# Patient Record
Sex: MALE | Race: WHITE | NOT HISPANIC OR LATINO | Employment: FULL TIME | URBAN - NONMETROPOLITAN AREA
[De-identification: names, ages, dates, MRNs, and addresses within clinical notes are randomized per-mention and may not be internally consistent; named-entity substitution may affect disease eponyms.]

---

## 2021-01-01 ENCOUNTER — HOSPITAL ENCOUNTER (INPATIENT)
Facility: HOSPITAL | Age: 41
LOS: 8 days | End: 2021-10-20
Attending: INTERNAL MEDICINE | Admitting: INTERNAL MEDICINE

## 2021-01-01 ENCOUNTER — APPOINTMENT (OUTPATIENT)
Dept: GENERAL RADIOLOGY | Facility: HOSPITAL | Age: 41
End: 2021-01-01

## 2021-01-01 ENCOUNTER — APPOINTMENT (OUTPATIENT)
Dept: CARDIOLOGY | Facility: HOSPITAL | Age: 41
End: 2021-01-01

## 2021-01-01 ENCOUNTER — APPOINTMENT (OUTPATIENT)
Dept: CT IMAGING | Facility: HOSPITAL | Age: 41
End: 2021-01-01

## 2021-01-01 ENCOUNTER — APPOINTMENT (OUTPATIENT)
Dept: ULTRASOUND IMAGING | Facility: HOSPITAL | Age: 41
End: 2021-01-01

## 2021-01-01 VITALS
WEIGHT: 315 LBS | HEIGHT: 72 IN | BODY MASS INDEX: 42.66 KG/M2 | RESPIRATION RATE: 32 BRPM | HEART RATE: 73 BPM | OXYGEN SATURATION: 82 % | SYSTOLIC BLOOD PRESSURE: 121 MMHG | TEMPERATURE: 99.1 F | DIASTOLIC BLOOD PRESSURE: 86 MMHG

## 2021-01-01 DIAGNOSIS — N17.9 AKI (ACUTE KIDNEY INJURY) (HCC): ICD-10-CM

## 2021-01-01 DIAGNOSIS — J96.01 ACUTE RESPIRATORY FAILURE WITH HYPOXIA (HCC): Primary | ICD-10-CM

## 2021-01-01 DIAGNOSIS — U07.1 COVID-19: ICD-10-CM

## 2021-01-01 DIAGNOSIS — U07.1 PNEUMONIA DUE TO COVID-19 VIRUS: ICD-10-CM

## 2021-01-01 DIAGNOSIS — J12.82 PNEUMONIA DUE TO COVID-19 VIRUS: ICD-10-CM

## 2021-01-01 LAB
ALBUMIN SERPL-MCNC: 2.9 G/DL (ref 3.5–5.2)
ALBUMIN SERPL-MCNC: 3.1 G/DL (ref 3.5–5.2)
ALBUMIN SERPL-MCNC: 3.4 G/DL (ref 3.5–5.2)
ALBUMIN SERPL-MCNC: 3.4 G/DL (ref 3.5–5.2)
ALBUMIN SERPL-MCNC: 3.5 G/DL (ref 3.5–5.2)
ALBUMIN SERPL-MCNC: 3.5 G/DL (ref 3.5–5.2)
ALBUMIN/GLOB SERPL: 0.8 G/DL
ALBUMIN/GLOB SERPL: 0.9 G/DL
ALBUMIN/GLOB SERPL: 1 G/DL
ALBUMIN/GLOB SERPL: 1 G/DL
ALP SERPL-CCNC: 100 U/L (ref 39–117)
ALP SERPL-CCNC: 120 U/L (ref 39–117)
ALP SERPL-CCNC: 218 U/L (ref 39–117)
ALP SERPL-CCNC: 56 U/L (ref 39–117)
ALP SERPL-CCNC: 58 U/L (ref 39–117)
ALP SERPL-CCNC: 60 U/L (ref 39–117)
ALP SERPL-CCNC: 73 U/L (ref 39–117)
ALP SERPL-CCNC: 83 U/L (ref 39–117)
ALP SERPL-CCNC: 88 U/L (ref 39–117)
ALT SERPL W P-5'-P-CCNC: 19 U/L (ref 1–41)
ALT SERPL W P-5'-P-CCNC: 20 U/L (ref 1–41)
ALT SERPL W P-5'-P-CCNC: 21 U/L (ref 1–41)
ALT SERPL W P-5'-P-CCNC: 21 U/L (ref 1–41)
ALT SERPL W P-5'-P-CCNC: 22 U/L (ref 1–41)
ALT SERPL W P-5'-P-CCNC: 28 U/L (ref 1–41)
ALT SERPL W P-5'-P-CCNC: 33 U/L (ref 1–41)
ALT SERPL W P-5'-P-CCNC: 37 U/L (ref 1–41)
ALT SERPL W P-5'-P-CCNC: 68 U/L (ref 1–41)
ANION GAP SERPL CALCULATED.3IONS-SCNC: 10 MMOL/L (ref 5–15)
ANION GAP SERPL CALCULATED.3IONS-SCNC: 10 MMOL/L (ref 5–15)
ANION GAP SERPL CALCULATED.3IONS-SCNC: 11 MMOL/L (ref 5–15)
ANION GAP SERPL CALCULATED.3IONS-SCNC: 12 MMOL/L (ref 5–15)
ANION GAP SERPL CALCULATED.3IONS-SCNC: 17 MMOL/L (ref 5–15)
ANISOCYTOSIS BLD QL: ABNORMAL
ARTERIAL PATENCY WRIST A: ABNORMAL
ARTERIAL PATENCY WRIST A: POSITIVE
AST SERPL-CCNC: 143 U/L (ref 1–40)
AST SERPL-CCNC: 25 U/L (ref 1–40)
AST SERPL-CCNC: 30 U/L (ref 1–40)
AST SERPL-CCNC: 32 U/L (ref 1–40)
AST SERPL-CCNC: 35 U/L (ref 1–40)
AST SERPL-CCNC: 37 U/L (ref 1–40)
AST SERPL-CCNC: 37 U/L (ref 1–40)
AST SERPL-CCNC: 44 U/L (ref 1–40)
AST SERPL-CCNC: 44 U/L (ref 1–40)
ATMOSPHERIC PRESS: 750 MMHG
ATMOSPHERIC PRESS: 754 MMHG
ATMOSPHERIC PRESS: 756 MMHG
BACTERIA SPEC AEROBE CULT: NORMAL
BACTERIA SPEC AEROBE CULT: NORMAL
BACTERIA SPEC RESP CULT: NORMAL
BASE EXCESS BLDA CALC-SCNC: -13.8 MMOL/L (ref 0–2)
BASE EXCESS BLDA CALC-SCNC: -3.5 MMOL/L (ref 0–2)
BASE EXCESS BLDA CALC-SCNC: -5.2 MMOL/L (ref 0–2)
BASE EXCESS BLDA CALC-SCNC: -6.3 MMOL/L (ref 0–2)
BASE EXCESS BLDA CALC-SCNC: 1.1 MMOL/L (ref 0–2)
BASOPHILS # BLD AUTO: 0.01 10*3/MM3 (ref 0–0.2)
BASOPHILS # BLD AUTO: 0.01 10*3/MM3 (ref 0–0.2)
BASOPHILS # BLD AUTO: 0.02 10*3/MM3 (ref 0–0.2)
BASOPHILS # BLD AUTO: 0.04 10*3/MM3 (ref 0–0.2)
BASOPHILS # BLD AUTO: 0.04 10*3/MM3 (ref 0–0.2)
BASOPHILS NFR BLD AUTO: 0.1 % (ref 0–1.5)
BASOPHILS NFR BLD AUTO: 0.2 % (ref 0–1.5)
BASOPHILS NFR BLD AUTO: 0.3 % (ref 0–1.5)
BASOPHILS NFR BLD AUTO: 0.4 % (ref 0–1.5)
BASOPHILS NFR BLD AUTO: 0.4 % (ref 0–1.5)
BDY SITE: ABNORMAL
BH CV ECHO MEAS - AO MAX PG (FULL): 6 MMHG
BH CV ECHO MEAS - AO MAX PG: 9.2 MMHG
BH CV ECHO MEAS - AO MEAN PG (FULL): 5 MMHG
BH CV ECHO MEAS - AO MEAN PG: 7 MMHG
BH CV ECHO MEAS - AO ROOT AREA (BSA CORRECTED): 1.5
BH CV ECHO MEAS - AO ROOT AREA: 11.9 CM^2
BH CV ECHO MEAS - AO ROOT DIAM: 3.9 CM
BH CV ECHO MEAS - AO V2 MAX: 152 CM/SEC
BH CV ECHO MEAS - AO V2 MEAN: 123 CM/SEC
BH CV ECHO MEAS - AO V2 VTI: 34.2 CM
BH CV ECHO MEAS - AVA(I,A): 2.5 CM^2
BH CV ECHO MEAS - AVA(I,D): 2.5 CM^2
BH CV ECHO MEAS - AVA(V,A): 2.4 CM^2
BH CV ECHO MEAS - AVA(V,D): 2.4 CM^2
BH CV ECHO MEAS - BSA(HAYCOCK): 2.8 M^2
BH CV ECHO MEAS - BSA: 2.6 M^2
BH CV ECHO MEAS - BZI_BMI: 43.9 KILOGRAMS/M^2
BH CV ECHO MEAS - BZI_METRIC_HEIGHT: 182.9 CM
BH CV ECHO MEAS - BZI_METRIC_WEIGHT: 147 KG
BH CV ECHO MEAS - EDV(CUBED): 124.3 ML
BH CV ECHO MEAS - EDV(MOD-SP4): 60 ML
BH CV ECHO MEAS - EDV(TEICH): 117.7 ML
BH CV ECHO MEAS - EF(MOD-SP4): 65.5 %
BH CV ECHO MEAS - ESV(MOD-SP4): 20.7 ML
BH CV ECHO MEAS - IVS/LVPW: 1.1
BH CV ECHO MEAS - IVSD: 1.5 CM
BH CV ECHO MEAS - LA DIMENSION: 3.9 CM
BH CV ECHO MEAS - LA/AO: 1
BH CV ECHO MEAS - LV DIASTOLIC VOL/BSA (35-75): 22.9 ML/M^2
BH CV ECHO MEAS - LV MASS(C)D: 293.6 GRAMS
BH CV ECHO MEAS - LV MASS(C)DI: 112.3 GRAMS/M^2
BH CV ECHO MEAS - LV MAX PG: 3.2 MMHG
BH CV ECHO MEAS - LV MEAN PG: 2 MMHG
BH CV ECHO MEAS - LV SYSTOLIC VOL/BSA (12-30): 7.9 ML/M^2
BH CV ECHO MEAS - LV V1 MAX: 89.4 CM/SEC
BH CV ECHO MEAS - LV V1 MEAN: 65.5 CM/SEC
BH CV ECHO MEAS - LV V1 VTI: 20.2 CM
BH CV ECHO MEAS - LVIDD: 5 CM
BH CV ECHO MEAS - LVLD AP4: 6.3 CM
BH CV ECHO MEAS - LVLS AP4: 5.8 CM
BH CV ECHO MEAS - LVOT AREA (M): 4.2 CM^2
BH CV ECHO MEAS - LVOT AREA: 4.2 CM^2
BH CV ECHO MEAS - LVOT DIAM: 2.3 CM
BH CV ECHO MEAS - LVPWD: 1.3 CM
BH CV ECHO MEAS - MV A MAX VEL: 92.2 CM/SEC
BH CV ECHO MEAS - MV DEC SLOPE: 369 CM/SEC^2
BH CV ECHO MEAS - MV DEC TIME: 0.28 SEC
BH CV ECHO MEAS - MV E MAX VEL: 104 CM/SEC
BH CV ECHO MEAS - MV E/A: 1.1
BH CV ECHO MEAS - SI(AO): 156.2 ML/M^2
BH CV ECHO MEAS - SI(LVOT): 32.1 ML/M^2
BH CV ECHO MEAS - SI(MOD-SP4): 15 ML/M^2
BH CV ECHO MEAS - SV(AO): 408.6 ML
BH CV ECHO MEAS - SV(LVOT): 83.9 ML
BH CV ECHO MEAS - SV(MOD-SP4): 39.3 ML
BILIRUB SERPL-MCNC: 0.5 MG/DL (ref 0–1.2)
BILIRUB SERPL-MCNC: 0.6 MG/DL (ref 0–1.2)
BILIRUB SERPL-MCNC: 0.6 MG/DL (ref 0–1.2)
BILIRUB SERPL-MCNC: 0.7 MG/DL (ref 0–1.2)
BILIRUB SERPL-MCNC: 1.1 MG/DL (ref 0–1.2)
BILIRUB SERPL-MCNC: 1.2 MG/DL (ref 0–1.2)
BODY TEMPERATURE: 37 C
BUN SERPL-MCNC: 21 MG/DL (ref 6–20)
BUN SERPL-MCNC: 22 MG/DL (ref 6–20)
BUN SERPL-MCNC: 24 MG/DL (ref 6–20)
BUN SERPL-MCNC: 26 MG/DL (ref 6–20)
BUN SERPL-MCNC: 28 MG/DL (ref 6–20)
BUN SERPL-MCNC: 32 MG/DL (ref 6–20)
BUN SERPL-MCNC: 34 MG/DL (ref 6–20)
BUN SERPL-MCNC: 37 MG/DL (ref 6–20)
BUN SERPL-MCNC: 43 MG/DL (ref 6–20)
BUN/CREAT SERPL: 16 (ref 7–25)
BUN/CREAT SERPL: 16.6 (ref 7–25)
BUN/CREAT SERPL: 18.6 (ref 7–25)
BUN/CREAT SERPL: 21.8 (ref 7–25)
BUN/CREAT SERPL: 22.6 (ref 7–25)
BUN/CREAT SERPL: 27.2 (ref 7–25)
BUN/CREAT SERPL: 29.1 (ref 7–25)
BUN/CREAT SERPL: 29.8 (ref 7–25)
BUN/CREAT SERPL: 7.6 (ref 7–25)
CALCIUM SPEC-SCNC: 7.2 MG/DL (ref 8.6–10.5)
CALCIUM SPEC-SCNC: 7.7 MG/DL (ref 8.6–10.5)
CALCIUM SPEC-SCNC: 8.5 MG/DL (ref 8.6–10.5)
CALCIUM SPEC-SCNC: 8.5 MG/DL (ref 8.6–10.5)
CALCIUM SPEC-SCNC: 8.6 MG/DL (ref 8.6–10.5)
CALCIUM SPEC-SCNC: 8.9 MG/DL (ref 8.6–10.5)
CALCIUM SPEC-SCNC: 8.9 MG/DL (ref 8.6–10.5)
CHLORIDE SERPL-SCNC: 100 MMOL/L (ref 98–107)
CHLORIDE SERPL-SCNC: 103 MMOL/L (ref 98–107)
CHLORIDE SERPL-SCNC: 103 MMOL/L (ref 98–107)
CHLORIDE SERPL-SCNC: 94 MMOL/L (ref 98–107)
CHLORIDE SERPL-SCNC: 94 MMOL/L (ref 98–107)
CHLORIDE SERPL-SCNC: 96 MMOL/L (ref 98–107)
CHLORIDE SERPL-SCNC: 97 MMOL/L (ref 98–107)
CHOLEST SERPL-MCNC: 124 MG/DL (ref 0–200)
CO2 SERPL-SCNC: 19 MMOL/L (ref 22–29)
CO2 SERPL-SCNC: 22 MMOL/L (ref 22–29)
CO2 SERPL-SCNC: 23 MMOL/L (ref 22–29)
CO2 SERPL-SCNC: 25 MMOL/L (ref 22–29)
CO2 SERPL-SCNC: 25 MMOL/L (ref 22–29)
CREAT SERPL-MCNC: 1.03 MG/DL (ref 0.76–1.27)
CREAT SERPL-MCNC: 1.1 MG/DL (ref 0.76–1.27)
CREAT SERPL-MCNC: 1.1 MG/DL (ref 0.76–1.27)
CREAT SERPL-MCNC: 1.14 MG/DL (ref 0.76–1.27)
CREAT SERPL-MCNC: 1.15 MG/DL (ref 0.76–1.27)
CREAT SERPL-MCNC: 1.18 MG/DL (ref 0.76–1.27)
CREAT SERPL-MCNC: 1.31 MG/DL (ref 0.76–1.27)
CREAT SERPL-MCNC: 2.59 MG/DL (ref 0.76–1.27)
CREAT SERPL-MCNC: 4.88 MG/DL (ref 0.76–1.27)
CRP SERPL-MCNC: 0.39 MG/DL (ref 0–0.5)
CRP SERPL-MCNC: 0.52 MG/DL (ref 0–0.5)
CRP SERPL-MCNC: 0.93 MG/DL (ref 0–0.5)
CRP SERPL-MCNC: 2.13 MG/DL (ref 0–0.5)
CRP SERPL-MCNC: 2.16 MG/DL (ref 0–0.5)
CRP SERPL-MCNC: 2.97 MG/DL (ref 0–0.5)
CRP SERPL-MCNC: 5.4 MG/DL (ref 0–0.5)
CRP SERPL-MCNC: 9.37 MG/DL (ref 0–0.5)
D DIMER PPP FEU-MCNC: 1.35 MG/L (FEU) (ref 0–0.5)
D-LACTATE SERPL-SCNC: 1.7 MMOL/L (ref 0.5–2)
DACRYOCYTES BLD QL SMEAR: ABNORMAL
DEPRECATED RDW RBC AUTO: 40.9 FL (ref 37–54)
DEPRECATED RDW RBC AUTO: 41.1 FL (ref 37–54)
DEPRECATED RDW RBC AUTO: 41.3 FL (ref 37–54)
DEPRECATED RDW RBC AUTO: 41.6 FL (ref 37–54)
DEPRECATED RDW RBC AUTO: 42.3 FL (ref 37–54)
DEPRECATED RDW RBC AUTO: 45.6 FL (ref 37–54)
DEPRECATED RDW RBC AUTO: 45.6 FL (ref 37–54)
EOSINOPHIL # BLD AUTO: 0 10*3/MM3 (ref 0–0.4)
EOSINOPHIL # BLD AUTO: 0.01 10*3/MM3 (ref 0–0.4)
EOSINOPHIL # BLD MANUAL: 0.11 10*3/MM3 (ref 0–0.4)
EOSINOPHIL # BLD MANUAL: 0.13 10*3/MM3 (ref 0–0.4)
EOSINOPHIL # BLD MANUAL: 0.76 10*3/MM3 (ref 0–0.4)
EOSINOPHIL NFR BLD AUTO: 0 % (ref 0.3–6.2)
EOSINOPHIL NFR BLD AUTO: 0.1 % (ref 0.3–6.2)
EOSINOPHIL NFR BLD MANUAL: 1 % (ref 0.3–6.2)
EOSINOPHIL NFR BLD MANUAL: 1 % (ref 0.3–6.2)
EOSINOPHIL NFR BLD MANUAL: 2 % (ref 0.3–6.2)
ERYTHROCYTE [DISTWIDTH] IN BLOOD BY AUTOMATED COUNT: 13.7 % (ref 12.3–15.4)
ERYTHROCYTE [DISTWIDTH] IN BLOOD BY AUTOMATED COUNT: 13.8 % (ref 12.3–15.4)
ERYTHROCYTE [DISTWIDTH] IN BLOOD BY AUTOMATED COUNT: 13.8 % (ref 12.3–15.4)
ERYTHROCYTE [DISTWIDTH] IN BLOOD BY AUTOMATED COUNT: 13.9 % (ref 12.3–15.4)
ERYTHROCYTE [DISTWIDTH] IN BLOOD BY AUTOMATED COUNT: 14.1 % (ref 12.3–15.4)
ERYTHROCYTE [DISTWIDTH] IN BLOOD BY AUTOMATED COUNT: 14.6 % (ref 12.3–15.4)
ERYTHROCYTE [DISTWIDTH] IN BLOOD BY AUTOMATED COUNT: 14.6 % (ref 12.3–15.4)
FERRITIN SERPL-MCNC: 1243 NG/ML (ref 30–400)
FERRITIN SERPL-MCNC: 1328 NG/ML (ref 30–400)
FERRITIN SERPL-MCNC: 1502 NG/ML (ref 30–400)
FERRITIN SERPL-MCNC: 1582 NG/ML (ref 30–400)
FERRITIN SERPL-MCNC: 1608 NG/ML (ref 30–400)
FERRITIN SERPL-MCNC: 1641 NG/ML (ref 30–400)
FERRITIN SERPL-MCNC: 2717 NG/ML (ref 30–400)
GAS FLOW AIRWAY: 6 LPM
GFR SERPL CREATININE-BSD FRML MDRD: 13 ML/MIN/1.73
GFR SERPL CREATININE-BSD FRML MDRD: 28 ML/MIN/1.73
GFR SERPL CREATININE-BSD FRML MDRD: 61 ML/MIN/1.73
GFR SERPL CREATININE-BSD FRML MDRD: 68 ML/MIN/1.73
GFR SERPL CREATININE-BSD FRML MDRD: 70 ML/MIN/1.73
GFR SERPL CREATININE-BSD FRML MDRD: 71 ML/MIN/1.73
GFR SERPL CREATININE-BSD FRML MDRD: 74 ML/MIN/1.73
GFR SERPL CREATININE-BSD FRML MDRD: 74 ML/MIN/1.73
GFR SERPL CREATININE-BSD FRML MDRD: 80 ML/MIN/1.73
GFR SERPL CREATININE-BSD FRML MDRD: ABNORMAL ML/MIN/{1.73_M2}
GLOBULIN UR ELPH-MCNC: 3.2 GM/DL
GLOBULIN UR ELPH-MCNC: 3.2 GM/DL
GLOBULIN UR ELPH-MCNC: 3.3 GM/DL
GLOBULIN UR ELPH-MCNC: 3.3 GM/DL
GLOBULIN UR ELPH-MCNC: 3.5 GM/DL
GLOBULIN UR ELPH-MCNC: 3.8 GM/DL
GLOBULIN UR ELPH-MCNC: 4 GM/DL
GLOBULIN UR ELPH-MCNC: 4.1 GM/DL
GLOBULIN UR ELPH-MCNC: 4.1 GM/DL
GLUCOSE BLDC GLUCOMTR-MCNC: 220 MG/DL (ref 70–130)
GLUCOSE BLDC GLUCOMTR-MCNC: 237 MG/DL (ref 70–130)
GLUCOSE BLDC GLUCOMTR-MCNC: 241 MG/DL (ref 70–130)
GLUCOSE BLDC GLUCOMTR-MCNC: 243 MG/DL (ref 70–130)
GLUCOSE BLDC GLUCOMTR-MCNC: 251 MG/DL (ref 70–130)
GLUCOSE BLDC GLUCOMTR-MCNC: 256 MG/DL (ref 70–130)
GLUCOSE BLDC GLUCOMTR-MCNC: 270 MG/DL (ref 70–130)
GLUCOSE BLDC GLUCOMTR-MCNC: 274 MG/DL (ref 70–130)
GLUCOSE BLDC GLUCOMTR-MCNC: 277 MG/DL (ref 70–130)
GLUCOSE BLDC GLUCOMTR-MCNC: 284 MG/DL (ref 70–130)
GLUCOSE BLDC GLUCOMTR-MCNC: 288 MG/DL (ref 70–130)
GLUCOSE BLDC GLUCOMTR-MCNC: 288 MG/DL (ref 70–130)
GLUCOSE BLDC GLUCOMTR-MCNC: 290 MG/DL (ref 70–130)
GLUCOSE BLDC GLUCOMTR-MCNC: 292 MG/DL (ref 70–130)
GLUCOSE BLDC GLUCOMTR-MCNC: 293 MG/DL (ref 70–130)
GLUCOSE BLDC GLUCOMTR-MCNC: 297 MG/DL (ref 70–130)
GLUCOSE BLDC GLUCOMTR-MCNC: 304 MG/DL (ref 70–130)
GLUCOSE BLDC GLUCOMTR-MCNC: 310 MG/DL (ref 70–130)
GLUCOSE BLDC GLUCOMTR-MCNC: 314 MG/DL (ref 70–130)
GLUCOSE BLDC GLUCOMTR-MCNC: 314 MG/DL (ref 70–130)
GLUCOSE BLDC GLUCOMTR-MCNC: 317 MG/DL (ref 70–130)
GLUCOSE BLDC GLUCOMTR-MCNC: 319 MG/DL (ref 70–130)
GLUCOSE BLDC GLUCOMTR-MCNC: 319 MG/DL (ref 70–130)
GLUCOSE BLDC GLUCOMTR-MCNC: 321 MG/DL (ref 70–130)
GLUCOSE BLDC GLUCOMTR-MCNC: 330 MG/DL (ref 70–130)
GLUCOSE BLDC GLUCOMTR-MCNC: 334 MG/DL (ref 70–130)
GLUCOSE BLDC GLUCOMTR-MCNC: 335 MG/DL (ref 70–130)
GLUCOSE BLDC GLUCOMTR-MCNC: 335 MG/DL (ref 70–130)
GLUCOSE BLDC GLUCOMTR-MCNC: 348 MG/DL (ref 70–130)
GLUCOSE BLDC GLUCOMTR-MCNC: 350 MG/DL (ref 70–130)
GLUCOSE BLDC GLUCOMTR-MCNC: 356 MG/DL (ref 70–130)
GLUCOSE BLDC GLUCOMTR-MCNC: 366 MG/DL (ref 70–130)
GLUCOSE BLDC GLUCOMTR-MCNC: 371 MG/DL (ref 70–130)
GLUCOSE BLDC GLUCOMTR-MCNC: 372 MG/DL (ref 70–130)
GLUCOSE BLDC GLUCOMTR-MCNC: 374 MG/DL (ref 70–130)
GLUCOSE BLDC GLUCOMTR-MCNC: 393 MG/DL (ref 70–130)
GLUCOSE BLDC GLUCOMTR-MCNC: 408 MG/DL (ref 70–130)
GLUCOSE BLDC GLUCOMTR-MCNC: 499 MG/DL (ref 70–130)
GLUCOSE SERPL-MCNC: 214 MG/DL (ref 65–99)
GLUCOSE SERPL-MCNC: 219 MG/DL (ref 65–99)
GLUCOSE SERPL-MCNC: 226 MG/DL (ref 65–99)
GLUCOSE SERPL-MCNC: 279 MG/DL (ref 65–99)
GLUCOSE SERPL-MCNC: 282 MG/DL (ref 65–99)
GLUCOSE SERPL-MCNC: 300 MG/DL (ref 65–99)
GLUCOSE SERPL-MCNC: 319 MG/DL (ref 65–99)
GLUCOSE SERPL-MCNC: 322 MG/DL (ref 65–99)
GLUCOSE SERPL-MCNC: 349 MG/DL (ref 65–99)
GRAM STN SPEC: NORMAL
HAV IGM SERPL QL IA: NORMAL
HBA1C MFR BLD: 8.4 % (ref 4.8–5.6)
HBV CORE IGM SERPL QL IA: NORMAL
HBV SURFACE AB SER RIA-ACNC: ABNORMAL
HBV SURFACE AG SERPL QL IA: NORMAL
HCO3 BLDA-SCNC: 21.6 MMOL/L (ref 20–26)
HCO3 BLDA-SCNC: 23.3 MMOL/L (ref 20–26)
HCO3 BLDA-SCNC: 25.5 MMOL/L (ref 20–26)
HCO3 BLDA-SCNC: 26.4 MMOL/L (ref 20–26)
HCO3 BLDA-SCNC: 27.4 MMOL/L (ref 20–26)
HCT VFR BLD AUTO: 43.2 % (ref 37.5–51)
HCT VFR BLD AUTO: 44.2 % (ref 37.5–51)
HCT VFR BLD AUTO: 44.5 % (ref 37.5–51)
HCT VFR BLD AUTO: 44.9 % (ref 37.5–51)
HCT VFR BLD AUTO: 45.6 % (ref 37.5–51)
HCT VFR BLD AUTO: 45.9 % (ref 37.5–51)
HCT VFR BLD AUTO: 48.4 % (ref 37.5–51)
HCT VFR BLD AUTO: 48.8 % (ref 37.5–51)
HCT VFR BLD AUTO: 49 % (ref 37.5–51)
HCV AB SER DONR QL: NORMAL
HDLC SERPL-MCNC: 15 MG/DL (ref 40–60)
HGB BLD-MCNC: 14.6 G/DL (ref 13–17.7)
HGB BLD-MCNC: 15 G/DL (ref 13–17.7)
HGB BLD-MCNC: 15 G/DL (ref 13–17.7)
HGB BLD-MCNC: 15.2 G/DL (ref 13–17.7)
HGB BLD-MCNC: 15.2 G/DL (ref 13–17.7)
HGB BLD-MCNC: 15.4 G/DL (ref 13–17.7)
HGB BLD-MCNC: 15.9 G/DL (ref 13–17.7)
HGB BLD-MCNC: 16.1 G/DL (ref 13–17.7)
HGB BLD-MCNC: 16.2 G/DL (ref 13–17.7)
IMM GRANULOCYTES # BLD AUTO: 0.06 10*3/MM3 (ref 0–0.05)
IMM GRANULOCYTES # BLD AUTO: 0.08 10*3/MM3 (ref 0–0.05)
IMM GRANULOCYTES # BLD AUTO: 0.13 10*3/MM3 (ref 0–0.05)
IMM GRANULOCYTES # BLD AUTO: 0.21 10*3/MM3 (ref 0–0.05)
IMM GRANULOCYTES # BLD AUTO: 0.28 10*3/MM3 (ref 0–0.05)
IMM GRANULOCYTES NFR BLD AUTO: 0.9 % (ref 0–0.5)
IMM GRANULOCYTES NFR BLD AUTO: 1.3 % (ref 0–0.5)
IMM GRANULOCYTES NFR BLD AUTO: 1.5 % (ref 0–0.5)
IMM GRANULOCYTES NFR BLD AUTO: 2 % (ref 0–0.5)
IMM GRANULOCYTES NFR BLD AUTO: 2.9 % (ref 0–0.5)
INHALED O2 CONCENTRATION: 100 %
L PNEUMO1 AG UR QL IA: NEGATIVE
LDH SERPL-CCNC: 694 U/L (ref 135–225)
LDLC SERPL CALC-MCNC: 65 MG/DL (ref 0–100)
LDLC/HDLC SERPL: 3.67 {RATIO}
LYMPHOCYTES # BLD AUTO: 0.92 10*3/MM3 (ref 0.7–3.1)
LYMPHOCYTES # BLD AUTO: 0.94 10*3/MM3 (ref 0.7–3.1)
LYMPHOCYTES # BLD AUTO: 1.06 10*3/MM3 (ref 0.7–3.1)
LYMPHOCYTES # BLD AUTO: 1.09 10*3/MM3 (ref 0.7–3.1)
LYMPHOCYTES # BLD AUTO: 1.44 10*3/MM3 (ref 0.7–3.1)
LYMPHOCYTES # BLD MANUAL: 0.32 10*3/MM3 (ref 0.7–3.1)
LYMPHOCYTES # BLD MANUAL: 0.76 10*3/MM3 (ref 0.7–3.1)
LYMPHOCYTES # BLD MANUAL: 0.81 10*3/MM3 (ref 0.7–3.1)
LYMPHOCYTES # BLD MANUAL: 1.14 10*3/MM3 (ref 0.7–3.1)
LYMPHOCYTES NFR BLD AUTO: 11 % (ref 19.6–45.3)
LYMPHOCYTES NFR BLD AUTO: 11.1 % (ref 19.6–45.3)
LYMPHOCYTES NFR BLD AUTO: 18.2 % (ref 19.6–45.3)
LYMPHOCYTES NFR BLD AUTO: 22.3 % (ref 19.6–45.3)
LYMPHOCYTES NFR BLD AUTO: 8.7 % (ref 19.6–45.3)
LYMPHOCYTES NFR BLD MANUAL: 1 % (ref 19.6–45.3)
LYMPHOCYTES NFR BLD MANUAL: 1 % (ref 5–12)
LYMPHOCYTES NFR BLD MANUAL: 2 % (ref 19.6–45.3)
LYMPHOCYTES NFR BLD MANUAL: 2 % (ref 5–12)
LYMPHOCYTES NFR BLD MANUAL: 3 % (ref 19.6–45.3)
LYMPHOCYTES NFR BLD MANUAL: 6 % (ref 5–12)
LYMPHOCYTES NFR BLD MANUAL: 6.1 % (ref 19.6–45.3)
LYMPHOCYTES NFR BLD MANUAL: 7.1 % (ref 5–12)
Lab: ABNORMAL
MAGNESIUM SERPL-MCNC: 2.3 MG/DL (ref 1.6–2.6)
MCH RBC QN AUTO: 27.1 PG (ref 26.6–33)
MCH RBC QN AUTO: 27.2 PG (ref 26.6–33)
MCH RBC QN AUTO: 27.3 PG (ref 26.6–33)
MCH RBC QN AUTO: 27.8 PG (ref 26.6–33)
MCH RBC QN AUTO: 28.1 PG (ref 26.6–33)
MCH RBC QN AUTO: 28.3 PG (ref 26.6–33)
MCH RBC QN AUTO: 28.3 PG (ref 26.6–33)
MCH RBC QN AUTO: 28.4 PG (ref 26.6–33)
MCH RBC QN AUTO: 28.4 PG (ref 26.6–33)
MCHC RBC AUTO-ENTMCNC: 32.9 G/DL (ref 31.5–35.7)
MCHC RBC AUTO-ENTMCNC: 32.9 G/DL (ref 31.5–35.7)
MCHC RBC AUTO-ENTMCNC: 33 G/DL (ref 31.5–35.7)
MCHC RBC AUTO-ENTMCNC: 33.1 G/DL (ref 31.5–35.7)
MCHC RBC AUTO-ENTMCNC: 33.1 G/DL (ref 31.5–35.7)
MCHC RBC AUTO-ENTMCNC: 33.7 G/DL (ref 31.5–35.7)
MCHC RBC AUTO-ENTMCNC: 33.8 G/DL (ref 31.5–35.7)
MCHC RBC AUTO-ENTMCNC: 34.3 G/DL (ref 31.5–35.7)
MCHC RBC AUTO-ENTMCNC: 34.4 G/DL (ref 31.5–35.7)
MCV RBC AUTO: 82.3 FL (ref 79–97)
MCV RBC AUTO: 82.3 FL (ref 79–97)
MCV RBC AUTO: 82.4 FL (ref 79–97)
MCV RBC AUTO: 82.6 FL (ref 79–97)
MCV RBC AUTO: 82.7 FL (ref 79–97)
MCV RBC AUTO: 82.9 FL (ref 79–97)
MCV RBC AUTO: 83.2 FL (ref 79–97)
MCV RBC AUTO: 85.5 FL (ref 79–97)
MCV RBC AUTO: 86.2 FL (ref 79–97)
METAMYELOCYTES NFR BLD MANUAL: 1 % (ref 0–0)
METAMYELOCYTES NFR BLD MANUAL: 1 % (ref 0–0)
METAMYELOCYTES NFR BLD MANUAL: 2 % (ref 0–0)
MICROCYTES BLD QL: ABNORMAL
MODALITY: ABNORMAL
MONOCYTES # BLD AUTO: 0.32 10*3/MM3 (ref 0.1–0.9)
MONOCYTES # BLD AUTO: 0.59 10*3/MM3 (ref 0.1–0.9)
MONOCYTES # BLD AUTO: 0.65 10*3/MM3 (ref 0.1–0.9)
MONOCYTES # BLD AUTO: 0.67 10*3/MM3 (ref 0.1–0.9)
MONOCYTES # BLD AUTO: 0.7 10*3/MM3 (ref 0.1–0.9)
MONOCYTES # BLD AUTO: 0.76 10*3/MM3 (ref 0.1–0.9)
MONOCYTES # BLD AUTO: 0.77 10*3/MM3 (ref 0.1–0.9)
MONOCYTES # BLD AUTO: 0.86 10*3/MM3 (ref 0.1–0.9)
MONOCYTES # BLD AUTO: 0.95 10*3/MM3 (ref 0.1–0.9)
MONOCYTES NFR BLD AUTO: 11.7 % (ref 5–12)
MONOCYTES NFR BLD AUTO: 13.3 % (ref 5–12)
MONOCYTES NFR BLD AUTO: 6.1 % (ref 5–12)
MONOCYTES NFR BLD AUTO: 7.3 % (ref 5–12)
MONOCYTES NFR BLD AUTO: 7.9 % (ref 5–12)
MRSA DNA SPEC QL NAA+PROBE: NORMAL
NEUTROPHILS # BLD AUTO: 11.44 10*3/MM3 (ref 1.7–7)
NEUTROPHILS # BLD AUTO: 31.08 10*3/MM3 (ref 1.7–7)
NEUTROPHILS # BLD AUTO: 34.81 10*3/MM3 (ref 1.7–7)
NEUTROPHILS # BLD AUTO: 9.08 10*3/MM3 (ref 1.7–7)
NEUTROPHILS NFR BLD AUTO: 4.09 10*3/MM3 (ref 1.7–7)
NEUTROPHILS NFR BLD AUTO: 4.12 10*3/MM3 (ref 1.7–7)
NEUTROPHILS NFR BLD AUTO: 6.7 10*3/MM3 (ref 1.7–7)
NEUTROPHILS NFR BLD AUTO: 63.2 % (ref 42.7–76)
NEUTROPHILS NFR BLD AUTO: 68.6 % (ref 42.7–76)
NEUTROPHILS NFR BLD AUTO: 7.62 10*3/MM3 (ref 1.7–7)
NEUTROPHILS NFR BLD AUTO: 79.4 % (ref 42.7–76)
NEUTROPHILS NFR BLD AUTO: 79.5 % (ref 42.7–76)
NEUTROPHILS NFR BLD AUTO: 8.65 10*3/MM3 (ref 1.7–7)
NEUTROPHILS NFR BLD AUTO: 81.6 % (ref 42.7–76)
NEUTROPHILS NFR BLD MANUAL: 80 % (ref 42.7–76)
NEUTROPHILS NFR BLD MANUAL: 83.8 % (ref 42.7–76)
NEUTROPHILS NFR BLD MANUAL: 84 % (ref 42.7–76)
NEUTROPHILS NFR BLD MANUAL: 87.5 % (ref 42.7–76)
NEUTS BAND NFR BLD MANUAL: 12 % (ref 0–5)
NEUTS BAND NFR BLD MANUAL: 2 % (ref 0–5)
NEUTS BAND NFR BLD MANUAL: 9.4 % (ref 0–5)
NOTIFIED BY: ABNORMAL
NOTIFIED WHO: ABNORMAL
NRBC BLD AUTO-RTO: 0 /100 WBC (ref 0–0.2)
NRBC SPEC MANUAL: 1 /100 WBC (ref 0–0.2)
PCO2 BLDA: 30.1 MM HG (ref 35–45)
PCO2 BLDA: 66 MM HG (ref 35–45)
PCO2 BLDA: 79.3 MM HG (ref 35–45)
PCO2 BLDA: 85.7 MM HG (ref 35–45)
PCO2 BLDA: >102 MM HG (ref 35–45)
PCO2 TEMP ADJ BLD: 30.1 MM HG (ref 35–45)
PCO2 TEMP ADJ BLD: 66 MM HG (ref 35–45)
PCO2 TEMP ADJ BLD: 79.3 MM HG (ref 35–45)
PCO2 TEMP ADJ BLD: 85.7 MM HG (ref 35–45)
PCO2 TEMP ADJ BLD: >102 MM HG (ref 35–45)
PEEP RESPIRATORY: 14 CM[H2O]
PEEP RESPIRATORY: 15 CM[H2O]
PEEP RESPIRATORY: 16 CM[H2O]
PEEP RESPIRATORY: 18 CM[H2O]
PH BLDA: 6.93 PH UNITS (ref 7.35–7.45)
PH BLDA: 7.11 PH UNITS (ref 7.35–7.45)
PH BLDA: 7.12 PH UNITS (ref 7.35–7.45)
PH BLDA: 7.21 PH UNITS (ref 7.35–7.45)
PH BLDA: 7.5 PH UNITS (ref 7.35–7.45)
PH, TEMP CORRECTED: 6.93 PH UNITS (ref 7.35–7.45)
PH, TEMP CORRECTED: 7.11 PH UNITS (ref 7.35–7.45)
PH, TEMP CORRECTED: 7.12 PH UNITS (ref 7.35–7.45)
PH, TEMP CORRECTED: 7.21 PH UNITS (ref 7.35–7.45)
PH, TEMP CORRECTED: 7.5 PH UNITS (ref 7.35–7.45)
PHOSPHATE SERPL-MCNC: 3.5 MG/DL (ref 2.5–4.5)
PLAT MORPH BLD: NORMAL
PLATELET # BLD AUTO: 175 10*3/MM3 (ref 140–450)
PLATELET # BLD AUTO: 178 10*3/MM3 (ref 140–450)
PLATELET # BLD AUTO: 188 10*3/MM3 (ref 140–450)
PLATELET # BLD AUTO: 195 10*3/MM3 (ref 140–450)
PLATELET # BLD AUTO: 204 10*3/MM3 (ref 140–450)
PLATELET # BLD AUTO: 254 10*3/MM3 (ref 140–450)
PLATELET # BLD AUTO: 261 10*3/MM3 (ref 140–450)
PLATELET # BLD AUTO: 284 10*3/MM3 (ref 140–450)
PLATELET # BLD AUTO: 322 10*3/MM3 (ref 140–450)
PMV BLD AUTO: 10 FL (ref 6–12)
PMV BLD AUTO: 10.2 FL (ref 6–12)
PMV BLD AUTO: 10.3 FL (ref 6–12)
PMV BLD AUTO: 10.4 FL (ref 6–12)
PMV BLD AUTO: 10.6 FL (ref 6–12)
PMV BLD AUTO: 10.6 FL (ref 6–12)
PMV BLD AUTO: 10.7 FL (ref 6–12)
PMV BLD AUTO: 10.7 FL (ref 6–12)
PMV BLD AUTO: 11.1 FL (ref 6–12)
PO2 BLDA: 45.8 MM HG (ref 83–108)
PO2 BLDA: 53.6 MM HG (ref 83–108)
PO2 BLDA: 55.2 MM HG (ref 83–108)
PO2 BLDA: 60.9 MM HG (ref 83–108)
PO2 BLDA: 73 MM HG (ref 83–108)
PO2 TEMP ADJ BLD: 45.8 MM HG (ref 83–108)
PO2 TEMP ADJ BLD: 53.6 MM HG (ref 83–108)
PO2 TEMP ADJ BLD: 55.2 MM HG (ref 83–108)
PO2 TEMP ADJ BLD: 60.9 MM HG (ref 83–108)
PO2 TEMP ADJ BLD: 73 MM HG (ref 83–108)
POIKILOCYTOSIS BLD QL SMEAR: ABNORMAL
POIKILOCYTOSIS BLD QL SMEAR: ABNORMAL
POLYCHROMASIA BLD QL SMEAR: ABNORMAL
POLYCHROMASIA BLD QL SMEAR: ABNORMAL
POTASSIUM SERPL-SCNC: 3.6 MMOL/L (ref 3.5–5.2)
POTASSIUM SERPL-SCNC: 3.6 MMOL/L (ref 3.5–5.2)
POTASSIUM SERPL-SCNC: 3.7 MMOL/L (ref 3.5–5.2)
POTASSIUM SERPL-SCNC: 3.8 MMOL/L (ref 3.5–5.2)
POTASSIUM SERPL-SCNC: 3.8 MMOL/L (ref 3.5–5.2)
POTASSIUM SERPL-SCNC: 4 MMOL/L (ref 3.5–5.2)
POTASSIUM SERPL-SCNC: 4.9 MMOL/L (ref 3.5–5.2)
POTASSIUM SERPL-SCNC: 6.6 MMOL/L (ref 3.5–5.2)
POTASSIUM SERPL-SCNC: 7.6 MMOL/L (ref 3.5–5.2)
PROCALCITONIN SERPL-MCNC: 0.14 NG/ML (ref 0–0.25)
PROCALCITONIN SERPL-MCNC: 0.14 NG/ML (ref 0–0.25)
PROCALCITONIN SERPL-MCNC: 0.26 NG/ML (ref 0–0.25)
PROCALCITONIN SERPL-MCNC: 0.33 NG/ML (ref 0–0.25)
PROCALCITONIN SERPL-MCNC: 0.94 NG/ML (ref 0–0.25)
PROT SERPL-MCNC: 6.1 G/DL (ref 6–8.5)
PROT SERPL-MCNC: 6.3 G/DL (ref 6–8.5)
PROT SERPL-MCNC: 6.4 G/DL (ref 6–8.5)
PROT SERPL-MCNC: 6.6 G/DL (ref 6–8.5)
PROT SERPL-MCNC: 6.7 G/DL (ref 6–8.5)
PROT SERPL-MCNC: 7.2 G/DL (ref 6–8.5)
PROT SERPL-MCNC: 7.3 G/DL (ref 6–8.5)
PROT SERPL-MCNC: 7.4 G/DL (ref 6–8.5)
PROT SERPL-MCNC: 7.6 G/DL (ref 6–8.5)
QT INTERVAL: 328 MS
QTC INTERVAL: 420 MS
RBC # BLD AUTO: 5.19 10*6/MM3 (ref 4.14–5.8)
RBC # BLD AUTO: 5.37 10*6/MM3 (ref 4.14–5.8)
RBC # BLD AUTO: 5.4 10*6/MM3 (ref 4.14–5.8)
RBC # BLD AUTO: 5.43 10*6/MM3 (ref 4.14–5.8)
RBC # BLD AUTO: 5.54 10*6/MM3 (ref 4.14–5.8)
RBC # BLD AUTO: 5.56 10*6/MM3 (ref 4.14–5.8)
RBC # BLD AUTO: 5.66 10*6/MM3 (ref 4.14–5.8)
RBC # BLD AUTO: 5.73 10*6/MM3 (ref 4.14–5.8)
RBC # BLD AUTO: 5.84 10*6/MM3 (ref 4.14–5.8)
S PNEUM AG SPEC QL LA: NEGATIVE
SAO2 % BLDCOA: 79.2 % (ref 94–99)
SAO2 % BLDCOA: 82.2 % (ref 94–99)
SAO2 % BLDCOA: 86.7 % (ref 94–99)
SAO2 % BLDCOA: 88.3 % (ref 94–99)
SAO2 % BLDCOA: 89.4 % (ref 94–99)
SARS-COV-2 RNA PNL SPEC NAA+PROBE: DETECTED
SET MECH RESP RATE: 30
SET MECH RESP RATE: 32
SODIUM SERPL-SCNC: 129 MMOL/L (ref 136–145)
SODIUM SERPL-SCNC: 130 MMOL/L (ref 136–145)
SODIUM SERPL-SCNC: 131 MMOL/L (ref 136–145)
SODIUM SERPL-SCNC: 132 MMOL/L (ref 136–145)
SODIUM SERPL-SCNC: 136 MMOL/L (ref 136–145)
SODIUM SERPL-SCNC: 136 MMOL/L (ref 136–145)
SODIUM SERPL-SCNC: 137 MMOL/L (ref 136–145)
TRIGL SERPL-MCNC: 270 MG/DL (ref 0–150)
TROPONIN T SERPL-MCNC: <0.01 NG/ML (ref 0–0.03)
TROPONIN T SERPL-MCNC: <0.01 NG/ML (ref 0–0.03)
TSH SERPL DL<=0.05 MIU/L-ACNC: 2.14 UIU/ML (ref 0.27–4.2)
URATE SERPL-MCNC: 6.9 MG/DL (ref 3.4–7)
VARIANT LYMPHS NFR BLD MANUAL: 1 % (ref 0–5)
VARIANT LYMPHS NFR BLD MANUAL: 4 % (ref 0–5)
VENTILATOR MODE: ABNORMAL
VENTILATOR MODE: ABNORMAL
VENTILATOR MODE: AC
VLDLC SERPL-MCNC: 44 MG/DL (ref 5–40)
VT ON VENT VENT: 450 ML
VT ON VENT VENT: 450 ML
VT ON VENT VENT: 500 ML
VT ON VENT VENT: 500 ML
WBC # BLD AUTO: 10.59 10*3/MM3 (ref 3.4–10.8)
WBC # BLD AUTO: 10.81 10*3/MM3 (ref 3.4–10.8)
WBC # BLD AUTO: 13.32 10*3/MM3 (ref 3.4–10.8)
WBC # BLD AUTO: 32.08 10*3/MM3 (ref 3.4–10.8)
WBC # BLD AUTO: 37.84 10*3/MM3 (ref 3.4–10.8)
WBC # BLD AUTO: 6 10*3/MM3 (ref 3.4–10.8)
WBC # BLD AUTO: 6.47 10*3/MM3 (ref 3.4–10.8)
WBC # BLD AUTO: 8.45 10*3/MM3 (ref 3.4–10.8)
WBC # BLD AUTO: 9.6 10*3/MM3 (ref 3.4–10.8)
WBC MORPH BLD: NORMAL

## 2021-01-01 PROCEDURE — 94660 CPAP INITIATION&MGMT: CPT

## 2021-01-01 PROCEDURE — 63710000001 INSULIN DETEMIR PER 5 UNITS: Performed by: FAMILY MEDICINE

## 2021-01-01 PROCEDURE — 84550 ASSAY OF BLOOD/URIC ACID: CPT | Performed by: INTERNAL MEDICINE

## 2021-01-01 PROCEDURE — 80061 LIPID PANEL: CPT | Performed by: FAMILY MEDICINE

## 2021-01-01 PROCEDURE — 94799 UNLISTED PULMONARY SVC/PX: CPT

## 2021-01-01 PROCEDURE — 80053 COMPREHEN METABOLIC PANEL: CPT | Performed by: FAMILY MEDICINE

## 2021-01-01 PROCEDURE — XW033H5 INTRODUCTION OF TOCILIZUMAB INTO PERIPHERAL VEIN, PERCUTANEOUS APPROACH, NEW TECHNOLOGY GROUP 5: ICD-10-PCS | Performed by: INTERNAL MEDICINE

## 2021-01-01 PROCEDURE — 85025 COMPLETE CBC W/AUTO DIFF WBC: CPT | Performed by: FAMILY MEDICINE

## 2021-01-01 PROCEDURE — 84145 PROCALCITONIN (PCT): CPT | Performed by: NURSE PRACTITIONER

## 2021-01-01 PROCEDURE — 25010000002 ENOXAPARIN PER 10 MG: Performed by: INTERNAL MEDICINE

## 2021-01-01 PROCEDURE — 4A133B1 MONITORING OF ARTERIAL PRESSURE, PERIPHERAL, PERCUTANEOUS APPROACH: ICD-10-PCS | Performed by: INTERNAL MEDICINE

## 2021-01-01 PROCEDURE — 94002 VENT MGMT INPAT INIT DAY: CPT

## 2021-01-01 PROCEDURE — 36600 WITHDRAWAL OF ARTERIAL BLOOD: CPT

## 2021-01-01 PROCEDURE — 25010000002 PHENYLEPHRINE 10 MG/ML SOLUTION 5 ML VIAL: Performed by: INTERNAL MEDICINE

## 2021-01-01 PROCEDURE — 99232 SBSQ HOSP IP/OBS MODERATE 35: CPT | Performed by: INTERNAL MEDICINE

## 2021-01-01 PROCEDURE — 82962 GLUCOSE BLOOD TEST: CPT

## 2021-01-01 PROCEDURE — 31500 INSERT EMERGENCY AIRWAY: CPT | Performed by: INTERNAL MEDICINE

## 2021-01-01 PROCEDURE — 63710000001 INSULIN LISPRO (HUMAN) PER 5 UNITS: Performed by: INTERNAL MEDICINE

## 2021-01-01 PROCEDURE — 93306 TTE W/DOPPLER COMPLETE: CPT | Performed by: INTERNAL MEDICINE

## 2021-01-01 PROCEDURE — 86140 C-REACTIVE PROTEIN: CPT | Performed by: INTERNAL MEDICINE

## 2021-01-01 PROCEDURE — 74018 RADEX ABDOMEN 1 VIEW: CPT

## 2021-01-01 PROCEDURE — 71275 CT ANGIOGRAPHY CHEST: CPT

## 2021-01-01 PROCEDURE — 5A1D70Z PERFORMANCE OF URINARY FILTRATION, INTERMITTENT, LESS THAN 6 HOURS PER DAY: ICD-10-PCS | Performed by: NURSE PRACTITIONER

## 2021-01-01 PROCEDURE — 83036 HEMOGLOBIN GLYCOSYLATED A1C: CPT | Performed by: INTERNAL MEDICINE

## 2021-01-01 PROCEDURE — 94003 VENT MGMT INPAT SUBQ DAY: CPT

## 2021-01-01 PROCEDURE — 0 IOPAMIDOL PER 1 ML: Performed by: NURSE PRACTITIONER

## 2021-01-01 PROCEDURE — 71045 X-RAY EXAM CHEST 1 VIEW: CPT

## 2021-01-01 PROCEDURE — 25010000002 HYDROCORTISONE SODIUM SUCCINATE 100 MG RECONSTITUTED SOLUTION: Performed by: INTERNAL MEDICINE

## 2021-01-01 PROCEDURE — 85007 BL SMEAR W/DIFF WBC COUNT: CPT | Performed by: FAMILY MEDICINE

## 2021-01-01 PROCEDURE — 83605 ASSAY OF LACTIC ACID: CPT | Performed by: NURSE PRACTITIONER

## 2021-01-01 PROCEDURE — 87899 AGENT NOS ASSAY W/OPTIC: CPT | Performed by: INTERNAL MEDICINE

## 2021-01-01 PROCEDURE — 84145 PROCALCITONIN (PCT): CPT | Performed by: FAMILY MEDICINE

## 2021-01-01 PROCEDURE — 25010000002 DEXAMETHASONE PER 1 MG: Performed by: NURSE PRACTITIONER

## 2021-01-01 PROCEDURE — 85025 COMPLETE CBC W/AUTO DIFF WBC: CPT | Performed by: INTERNAL MEDICINE

## 2021-01-01 PROCEDURE — 85379 FIBRIN DEGRADATION QUANT: CPT | Performed by: NURSE PRACTITIONER

## 2021-01-01 PROCEDURE — 84100 ASSAY OF PHOSPHORUS: CPT | Performed by: INTERNAL MEDICINE

## 2021-01-01 PROCEDURE — 86140 C-REACTIVE PROTEIN: CPT | Performed by: FAMILY MEDICINE

## 2021-01-01 PROCEDURE — 87040 BLOOD CULTURE FOR BACTERIA: CPT | Performed by: INTERNAL MEDICINE

## 2021-01-01 PROCEDURE — 25010000002 FUROSEMIDE PER 20 MG: Performed by: INTERNAL MEDICINE

## 2021-01-01 PROCEDURE — 87641 MR-STAPH DNA AMP PROBE: CPT | Performed by: INTERNAL MEDICINE

## 2021-01-01 PROCEDURE — 99233 SBSQ HOSP IP/OBS HIGH 50: CPT | Performed by: INTERNAL MEDICINE

## 2021-01-01 PROCEDURE — 99222 1ST HOSP IP/OBS MODERATE 55: CPT | Performed by: INTERNAL MEDICINE

## 2021-01-01 PROCEDURE — 84145 PROCALCITONIN (PCT): CPT | Performed by: INTERNAL MEDICINE

## 2021-01-01 PROCEDURE — 02HV33Z INSERTION OF INFUSION DEVICE INTO SUPERIOR VENA CAVA, PERCUTANEOUS APPROACH: ICD-10-PCS | Performed by: INTERNAL MEDICINE

## 2021-01-01 PROCEDURE — 82803 BLOOD GASES ANY COMBINATION: CPT

## 2021-01-01 PROCEDURE — 99255 IP/OBS CONSLTJ NEW/EST HI 80: CPT | Performed by: INTERNAL MEDICINE

## 2021-01-01 PROCEDURE — 25010000002 DEXAMETHASONE SODIUM PHOSPHATE 10 MG/ML SOLUTION: Performed by: NURSE PRACTITIONER

## 2021-01-01 PROCEDURE — 63710000001 DEXAMETHASONE PER 0.25 MG: Performed by: INTERNAL MEDICINE

## 2021-01-01 PROCEDURE — 03HY32Z INSERTION OF MONITORING DEVICE INTO UPPER ARTERY, PERCUTANEOUS APPROACH: ICD-10-PCS | Performed by: INTERNAL MEDICINE

## 2021-01-01 PROCEDURE — 63710000001 INSULIN LISPRO (HUMAN) PER 5 UNITS: Performed by: FAMILY MEDICINE

## 2021-01-01 PROCEDURE — 82728 ASSAY OF FERRITIN: CPT | Performed by: FAMILY MEDICINE

## 2021-01-01 PROCEDURE — 4A133J1 MONITORING OF ARTERIAL PULSE, PERIPHERAL, PERCUTANEOUS APPROACH: ICD-10-PCS | Performed by: INTERNAL MEDICINE

## 2021-01-01 PROCEDURE — 76775 US EXAM ABDO BACK WALL LIM: CPT

## 2021-01-01 PROCEDURE — 83735 ASSAY OF MAGNESIUM: CPT | Performed by: INTERNAL MEDICINE

## 2021-01-01 PROCEDURE — 25010000002 FENTANYL CITRATE (PF) 2500 MCG/50ML SOLUTION: Performed by: INTERNAL MEDICINE

## 2021-01-01 PROCEDURE — 94640 AIRWAY INHALATION TREATMENT: CPT

## 2021-01-01 PROCEDURE — 25010000002 PROPOFOL 10 MG/ML EMULSION: Performed by: INTERNAL MEDICINE

## 2021-01-01 PROCEDURE — 0BH18EZ INSERTION OF ENDOTRACHEAL AIRWAY INTO TRACHEA, VIA NATURAL OR ARTIFICIAL OPENING ENDOSCOPIC: ICD-10-PCS | Performed by: INTERNAL MEDICINE

## 2021-01-01 PROCEDURE — 82728 ASSAY OF FERRITIN: CPT | Performed by: NURSE PRACTITIONER

## 2021-01-01 PROCEDURE — 84443 ASSAY THYROID STIM HORMONE: CPT | Performed by: FAMILY MEDICINE

## 2021-01-01 PROCEDURE — 83615 LACTATE (LD) (LDH) ENZYME: CPT | Performed by: NURSE PRACTITIONER

## 2021-01-01 PROCEDURE — 85025 COMPLETE CBC W/AUTO DIFF WBC: CPT | Performed by: NURSE PRACTITIONER

## 2021-01-01 PROCEDURE — 87070 CULTURE OTHR SPECIMN AEROBIC: CPT | Performed by: INTERNAL MEDICINE

## 2021-01-01 PROCEDURE — 25010000002 FUROSEMIDE PER 20 MG

## 2021-01-01 PROCEDURE — 80074 ACUTE HEPATITIS PANEL: CPT | Performed by: INTERNAL MEDICINE

## 2021-01-01 PROCEDURE — 80053 COMPREHEN METABOLIC PANEL: CPT | Performed by: INTERNAL MEDICINE

## 2021-01-01 PROCEDURE — 25010000002 PERFLUTREN 6.52 MG/ML SUSPENSION: Performed by: FAMILY MEDICINE

## 2021-01-01 PROCEDURE — 63710000001 INSULIN REGULAR HUMAN PER 5 UNITS: Performed by: INTERNAL MEDICINE

## 2021-01-01 PROCEDURE — 5A1945Z RESPIRATORY VENTILATION, 24-96 CONSECUTIVE HOURS: ICD-10-PCS | Performed by: INTERNAL MEDICINE

## 2021-01-01 PROCEDURE — 82728 ASSAY OF FERRITIN: CPT | Performed by: INTERNAL MEDICINE

## 2021-01-01 PROCEDURE — 93005 ELECTROCARDIOGRAM TRACING: CPT | Performed by: INTERNAL MEDICINE

## 2021-01-01 PROCEDURE — XW033E5 INTRODUCTION OF REMDESIVIR ANTI-INFECTIVE INTO PERIPHERAL VEIN, PERCUTANEOUS APPROACH, NEW TECHNOLOGY GROUP 5: ICD-10-PCS | Performed by: FAMILY MEDICINE

## 2021-01-01 PROCEDURE — 87205 SMEAR GRAM STAIN: CPT | Performed by: INTERNAL MEDICINE

## 2021-01-01 PROCEDURE — 86706 HEP B SURFACE ANTIBODY: CPT | Performed by: INTERNAL MEDICINE

## 2021-01-01 PROCEDURE — 99285 EMERGENCY DEPT VISIT HI MDM: CPT

## 2021-01-01 PROCEDURE — 84484 ASSAY OF TROPONIN QUANT: CPT | Performed by: INTERNAL MEDICINE

## 2021-01-01 PROCEDURE — 80053 COMPREHEN METABOLIC PANEL: CPT | Performed by: NURSE PRACTITIONER

## 2021-01-01 PROCEDURE — 93010 ELECTROCARDIOGRAM REPORT: CPT | Performed by: INTERNAL MEDICINE

## 2021-01-01 PROCEDURE — 93005 ELECTROCARDIOGRAM TRACING: CPT

## 2021-01-01 PROCEDURE — 25010000002 MIDAZOLAM HCL (PF) 5 MG/5ML SOLUTION: Performed by: INTERNAL MEDICINE

## 2021-01-01 PROCEDURE — 25010000002 MEROPENEM PER 100 MG: Performed by: INTERNAL MEDICINE

## 2021-01-01 PROCEDURE — 93306 TTE W/DOPPLER COMPLETE: CPT

## 2021-01-01 PROCEDURE — 25010000002 HEPARIN (PORCINE) PER 1000 UNITS: Performed by: INTERNAL MEDICINE

## 2021-01-01 PROCEDURE — 84484 ASSAY OF TROPONIN QUANT: CPT | Performed by: NURSE PRACTITIONER

## 2021-01-01 PROCEDURE — 25010000002 PROPOFOL 1000 MG/100ML EMULSION: Performed by: INTERNAL MEDICINE

## 2021-01-01 PROCEDURE — 25010000002 TOCILIZUMAB 400 MG/20ML SOLUTION 20 ML VIAL: Performed by: INTERNAL MEDICINE

## 2021-01-01 PROCEDURE — 87635 SARS-COV-2 COVID-19 AMP PRB: CPT | Performed by: NURSE PRACTITIONER

## 2021-01-01 RX ORDER — BENZONATATE 100 MG/1
200 CAPSULE ORAL 3 TIMES DAILY PRN
Status: DISCONTINUED | OUTPATIENT
Start: 2021-01-01 | End: 2021-01-01 | Stop reason: HOSPADM

## 2021-01-01 RX ORDER — ACETAMINOPHEN 650 MG/1
650 SUPPOSITORY RECTAL EVERY 4 HOURS PRN
Status: DISCONTINUED | OUTPATIENT
Start: 2021-01-01 | End: 2021-01-01 | Stop reason: HOSPADM

## 2021-01-01 RX ORDER — DEXAMETHASONE SODIUM PHOSPHATE 4 MG/ML
6 INJECTION, SOLUTION INTRA-ARTICULAR; INTRALESIONAL; INTRAMUSCULAR; INTRAVENOUS; SOFT TISSUE DAILY
Status: DISCONTINUED | OUTPATIENT
Start: 2021-01-01 | End: 2021-01-01

## 2021-01-01 RX ORDER — ROCURONIUM BROMIDE 10 MG/ML
100 INJECTION, SOLUTION INTRAVENOUS ONCE
Status: COMPLETED | OUTPATIENT
Start: 2021-01-01 | End: 2021-01-01

## 2021-01-01 RX ORDER — NICOTINE POLACRILEX 4 MG
15 LOZENGE BUCCAL
Status: DISCONTINUED | OUTPATIENT
Start: 2021-01-01 | End: 2021-01-01

## 2021-01-01 RX ORDER — ATORVASTATIN CALCIUM 10 MG/1
20 TABLET, FILM COATED ORAL NIGHTLY
Status: DISCONTINUED | OUTPATIENT
Start: 2021-01-01 | End: 2021-01-01 | Stop reason: HOSPADM

## 2021-01-01 RX ORDER — ACETAMINOPHEN 325 MG/1
650 TABLET ORAL EVERY 4 HOURS PRN
Status: DISCONTINUED | OUTPATIENT
Start: 2021-01-01 | End: 2021-01-01 | Stop reason: HOSPADM

## 2021-01-01 RX ORDER — LABETALOL HYDROCHLORIDE 5 MG/ML
10 INJECTION, SOLUTION INTRAVENOUS EVERY 6 HOURS PRN
Status: DISCONTINUED | OUTPATIENT
Start: 2021-01-01 | End: 2021-01-01 | Stop reason: HOSPADM

## 2021-01-01 RX ORDER — DEXTROSE MONOHYDRATE 25 G/50ML
50 INJECTION, SOLUTION INTRAVENOUS ONCE
Status: COMPLETED | OUTPATIENT
Start: 2021-01-01 | End: 2021-01-01

## 2021-01-01 RX ORDER — DEXTROSE MONOHYDRATE 25 G/50ML
25 INJECTION, SOLUTION INTRAVENOUS
Status: DISCONTINUED | OUTPATIENT
Start: 2021-01-01 | End: 2021-01-01 | Stop reason: HOSPADM

## 2021-01-01 RX ORDER — SODIUM CHLORIDE 9 MG/ML
50 INJECTION, SOLUTION INTRAVENOUS CONTINUOUS
Status: DISPENSED | OUTPATIENT
Start: 2021-01-01 | End: 2021-01-01

## 2021-01-01 RX ORDER — SODIUM CHLORIDE 0.9 % (FLUSH) 0.9 %
10 SYRINGE (ML) INJECTION EVERY 12 HOURS SCHEDULED
Status: DISCONTINUED | OUTPATIENT
Start: 2021-01-01 | End: 2021-01-01 | Stop reason: HOSPADM

## 2021-01-01 RX ORDER — SODIUM CHLORIDE, SODIUM LACTATE, POTASSIUM CHLORIDE, CALCIUM CHLORIDE 600; 310; 30; 20 MG/100ML; MG/100ML; MG/100ML; MG/100ML
75 INJECTION, SOLUTION INTRAVENOUS CONTINUOUS
Status: DISCONTINUED | OUTPATIENT
Start: 2021-01-01 | End: 2021-01-01 | Stop reason: HOSPADM

## 2021-01-01 RX ORDER — ASCORBIC ACID 500 MG
500 TABLET ORAL DAILY
Status: DISCONTINUED | OUTPATIENT
Start: 2021-01-01 | End: 2021-01-01 | Stop reason: HOSPADM

## 2021-01-01 RX ORDER — SODIUM CHLORIDE 9 MG/ML
50 INJECTION, SOLUTION INTRAVENOUS CONTINUOUS
Status: DISCONTINUED | OUTPATIENT
Start: 2021-01-01 | End: 2021-01-01

## 2021-01-01 RX ORDER — GUAIFENESIN 600 MG/1
1200 TABLET, EXTENDED RELEASE ORAL 2 TIMES DAILY
Status: DISCONTINUED | OUTPATIENT
Start: 2021-01-01 | End: 2021-01-01

## 2021-01-01 RX ORDER — FUROSEMIDE 10 MG/ML
40 INJECTION INTRAMUSCULAR; INTRAVENOUS ONCE
Status: COMPLETED | OUTPATIENT
Start: 2021-01-01 | End: 2021-01-01

## 2021-01-01 RX ORDER — ETOMIDATE 2 MG/ML
20 INJECTION INTRAVENOUS ONCE
Status: COMPLETED | OUTPATIENT
Start: 2021-01-01 | End: 2021-01-01

## 2021-01-01 RX ORDER — MULTIVIT WITH MINERALS/LUTEIN
250 TABLET ORAL DAILY
COMMUNITY

## 2021-01-01 RX ORDER — DEXTROSE MONOHYDRATE 25 G/50ML
25 INJECTION, SOLUTION INTRAVENOUS
Status: DISCONTINUED | OUTPATIENT
Start: 2021-01-01 | End: 2021-01-01

## 2021-01-01 RX ORDER — FAMOTIDINE 20 MG/1
20 TABLET, FILM COATED ORAL
Status: DISCONTINUED | OUTPATIENT
Start: 2021-01-01 | End: 2021-01-01

## 2021-01-01 RX ORDER — FUROSEMIDE 10 MG/ML
40 INJECTION INTRAMUSCULAR; INTRAVENOUS ONCE
Status: DISCONTINUED | OUTPATIENT
Start: 2021-01-01 | End: 2021-01-01

## 2021-01-01 RX ORDER — AMOXICILLIN 250 MG
2 CAPSULE ORAL NIGHTLY PRN
Status: DISCONTINUED | OUTPATIENT
Start: 2021-01-01 | End: 2021-01-01 | Stop reason: HOSPADM

## 2021-01-01 RX ORDER — NOREPINEPHRINE BIT/0.9 % NACL 8 MG/250ML
.02-.3 INFUSION BOTTLE (ML) INTRAVENOUS
Status: DISCONTINUED | OUTPATIENT
Start: 2021-01-01 | End: 2021-01-01 | Stop reason: HOSPADM

## 2021-01-01 RX ORDER — ALBUTEROL SULFATE 90 UG/1
2 AEROSOL, METERED RESPIRATORY (INHALATION)
Status: DISCONTINUED | OUTPATIENT
Start: 2021-01-01 | End: 2021-01-01

## 2021-01-01 RX ORDER — FUROSEMIDE 10 MG/ML
INJECTION INTRAMUSCULAR; INTRAVENOUS
Status: COMPLETED
Start: 2021-01-01 | End: 2021-01-01

## 2021-01-01 RX ORDER — FAMOTIDINE 10 MG/ML
20 INJECTION, SOLUTION INTRAVENOUS DAILY
Status: DISCONTINUED | OUTPATIENT
Start: 2021-01-01 | End: 2021-01-01 | Stop reason: HOSPADM

## 2021-01-01 RX ORDER — ECHINACEA PURPUREA EXTRACT 125 MG
1 TABLET ORAL AS NEEDED
Status: DISCONTINUED | OUTPATIENT
Start: 2021-01-01 | End: 2021-01-01 | Stop reason: HOSPADM

## 2021-01-01 RX ORDER — MELATONIN
2000 DAILY
Status: DISCONTINUED | OUTPATIENT
Start: 2021-01-01 | End: 2021-01-01

## 2021-01-01 RX ORDER — ONDANSETRON 2 MG/ML
4 INJECTION INTRAMUSCULAR; INTRAVENOUS EVERY 6 HOURS PRN
Status: DISCONTINUED | OUTPATIENT
Start: 2021-01-01 | End: 2021-01-01 | Stop reason: HOSPADM

## 2021-01-01 RX ORDER — MELATONIN
2000 DAILY
Status: DISCONTINUED | OUTPATIENT
Start: 2021-01-01 | End: 2021-01-01 | Stop reason: HOSPADM

## 2021-01-01 RX ORDER — ACETAMINOPHEN 325 MG/1
650 TABLET ORAL EVERY 6 HOURS PRN
COMMUNITY

## 2021-01-01 RX ORDER — MIDAZOLAM HYDROCHLORIDE 1 MG/ML
5 INJECTION, SOLUTION INTRAMUSCULAR; INTRAVENOUS ONCE
Status: COMPLETED | OUTPATIENT
Start: 2021-01-01 | End: 2021-01-01

## 2021-01-01 RX ORDER — CALCIUM CHLORIDE 100 MG/ML
1 INJECTION INTRAVENOUS; INTRAVENTRICULAR ONCE
Status: COMPLETED | OUTPATIENT
Start: 2021-01-01 | End: 2021-01-01

## 2021-01-01 RX ORDER — MELATONIN
1000 DAILY
Status: DISCONTINUED | OUTPATIENT
Start: 2021-01-01 | End: 2021-01-01

## 2021-01-01 RX ORDER — DEXTROMETHORPHAN POLISTIREX 30 MG/5ML
60 SUSPENSION ORAL 2 TIMES DAILY PRN
Status: DISCONTINUED | OUTPATIENT
Start: 2021-01-01 | End: 2021-01-01 | Stop reason: HOSPADM

## 2021-01-01 RX ORDER — GUAIFENESIN 600 MG/1
1200 TABLET, EXTENDED RELEASE ORAL 2 TIMES DAILY
COMMUNITY

## 2021-01-01 RX ORDER — HEPARIN SODIUM 1000 [USP'U]/ML
3200 INJECTION, SOLUTION INTRAVENOUS; SUBCUTANEOUS AS NEEDED
Status: DISCONTINUED | OUTPATIENT
Start: 2021-01-01 | End: 2021-01-01 | Stop reason: HOSPADM

## 2021-01-01 RX ORDER — SODIUM CHLORIDE 0.9 % (FLUSH) 0.9 %
10 SYRINGE (ML) INJECTION AS NEEDED
Status: DISCONTINUED | OUTPATIENT
Start: 2021-01-01 | End: 2021-01-01 | Stop reason: HOSPADM

## 2021-01-01 RX ORDER — NICOTINE POLACRILEX 4 MG
15 LOZENGE BUCCAL
Status: DISCONTINUED | OUTPATIENT
Start: 2021-01-01 | End: 2021-01-01 | Stop reason: HOSPADM

## 2021-01-01 RX ORDER — ATORVASTATIN CALCIUM 10 MG/1
20 TABLET, FILM COATED ORAL NIGHTLY
Status: DISCONTINUED | OUTPATIENT
Start: 2021-01-01 | End: 2021-01-01

## 2021-01-01 RX ORDER — ASCORBIC ACID 500 MG
500 TABLET ORAL DAILY
Status: DISCONTINUED | OUTPATIENT
Start: 2021-01-01 | End: 2021-01-01

## 2021-01-01 RX ORDER — DEXAMETHASONE SODIUM PHOSPHATE 10 MG/ML
10 INJECTION, SOLUTION INTRAMUSCULAR; INTRAVENOUS 2 TIMES DAILY
Status: DISCONTINUED | OUTPATIENT
Start: 2021-01-01 | End: 2021-01-01 | Stop reason: HOSPADM

## 2021-01-01 RX ORDER — ZINC SULFATE 50(220)MG
220 CAPSULE ORAL DAILY
Status: DISCONTINUED | OUTPATIENT
Start: 2021-01-01 | End: 2021-01-01

## 2021-01-01 RX ORDER — FAMOTIDINE 10 MG/ML
20 INJECTION, SOLUTION INTRAVENOUS EVERY 12 HOURS SCHEDULED
Status: DISCONTINUED | OUTPATIENT
Start: 2021-01-01 | End: 2021-01-01

## 2021-01-01 RX ORDER — ATORVASTATIN CALCIUM 10 MG/1
10 TABLET, FILM COATED ORAL NIGHTLY
Status: DISCONTINUED | OUTPATIENT
Start: 2021-01-01 | End: 2021-01-01

## 2021-01-01 RX ORDER — ZINC SULFATE 50(220)MG
220 CAPSULE ORAL DAILY
Status: DISCONTINUED | OUTPATIENT
Start: 2021-01-01 | End: 2021-01-01 | Stop reason: HOSPADM

## 2021-01-01 RX ORDER — SODIUM CHLORIDE 9 MG/ML
75 INJECTION, SOLUTION INTRAVENOUS CONTINUOUS
Status: DISCONTINUED | OUTPATIENT
Start: 2021-01-01 | End: 2021-01-01

## 2021-01-01 RX ORDER — DEXAMETHASONE SODIUM PHOSPHATE 4 MG/ML
6 INJECTION, SOLUTION INTRA-ARTICULAR; INTRALESIONAL; INTRAMUSCULAR; INTRAVENOUS; SOFT TISSUE 2 TIMES DAILY
Status: DISCONTINUED | OUTPATIENT
Start: 2021-01-01 | End: 2021-01-01

## 2021-01-01 RX ORDER — CHLORHEXIDINE GLUCONATE 0.12 MG/ML
15 RINSE ORAL EVERY 12 HOURS SCHEDULED
Status: DISCONTINUED | OUTPATIENT
Start: 2021-01-01 | End: 2021-01-01 | Stop reason: HOSPADM

## 2021-01-01 RX ORDER — DEXAMETHASONE SODIUM PHOSPHATE 10 MG/ML
6 INJECTION INTRAMUSCULAR; INTRAVENOUS ONCE
Status: COMPLETED | OUTPATIENT
Start: 2021-01-01 | End: 2021-01-01

## 2021-01-01 RX ADMIN — INSULIN LISPRO 6 UNITS: 100 INJECTION, SOLUTION INTRAVENOUS; SUBCUTANEOUS at 20:40

## 2021-01-01 RX ADMIN — SODIUM CHLORIDE, POTASSIUM CHLORIDE, SODIUM LACTATE AND CALCIUM CHLORIDE 75 ML/HR: 600; 310; 30; 20 INJECTION, SOLUTION INTRAVENOUS at 10:43

## 2021-01-01 RX ADMIN — GUAIFENESIN 1200 MG: 600 TABLET, EXTENDED RELEASE ORAL at 08:35

## 2021-01-01 RX ADMIN — INSULIN HUMAN 20 UNITS: 100 INJECTION, SOLUTION PARENTERAL at 05:20

## 2021-01-01 RX ADMIN — PROPOFOL 75 MCG/KG/MIN: 10 INJECTION, EMULSION INTRAVENOUS at 05:10

## 2021-01-01 RX ADMIN — FAMOTIDINE 20 MG: 10 INJECTION, SOLUTION INTRAVENOUS at 20:01

## 2021-01-01 RX ADMIN — ALBUTEROL SULFATE 2 PUFF: 108 AEROSOL, METERED RESPIRATORY (INHALATION) at 14:25

## 2021-01-01 RX ADMIN — DEXAMETHASONE 6 MG: 4 TABLET ORAL at 07:44

## 2021-01-01 RX ADMIN — INSULIN DETEMIR 20 UNITS: 100 INJECTION, SOLUTION SUBCUTANEOUS at 21:18

## 2021-01-01 RX ADMIN — PROPOFOL 75 MCG/KG/MIN: 10 INJECTION, EMULSION INTRAVENOUS at 04:13

## 2021-01-01 RX ADMIN — PROPOFOL 75 MCG/KG/MIN: 10 INJECTION, EMULSION INTRAVENOUS at 21:39

## 2021-01-01 RX ADMIN — ENOXAPARIN SODIUM 40 MG: 40 INJECTION SUBCUTANEOUS at 08:17

## 2021-01-01 RX ADMIN — ENOXAPARIN SODIUM 30 MG: 30 INJECTION SUBCUTANEOUS at 09:25

## 2021-01-01 RX ADMIN — INSULIN HUMAN 5 UNITS: 100 INJECTION, SOLUTION PARENTERAL at 17:20

## 2021-01-01 RX ADMIN — INSULIN DETEMIR 20 UNITS: 100 INJECTION, SOLUTION SUBCUTANEOUS at 10:45

## 2021-01-01 RX ADMIN — DEXAMETHASONE 6 MG: 4 TABLET ORAL at 08:00

## 2021-01-01 RX ADMIN — ZINC SULFATE 220 MG (50 MG) CAPSULE 220 MG: CAPSULE at 08:16

## 2021-01-01 RX ADMIN — FAMOTIDINE 20 MG: 20 TABLET, FILM COATED ORAL at 08:49

## 2021-01-01 RX ADMIN — GUAIFENESIN 400 MG: 100 SOLUTION ORAL at 12:31

## 2021-01-01 RX ADMIN — ATORVASTATIN CALCIUM 10 MG: 10 TABLET, FILM COATED ORAL at 00:16

## 2021-01-01 RX ADMIN — INSULIN HUMAN 16 UNITS: 100 INJECTION, SOLUTION PARENTERAL at 17:49

## 2021-01-01 RX ADMIN — SODIUM CHLORIDE 0.5 MCG/KG/MIN: 9 INJECTION, SOLUTION INTRAVENOUS at 02:37

## 2021-01-01 RX ADMIN — ALBUTEROL SULFATE 2 PUFF: 108 AEROSOL, METERED RESPIRATORY (INHALATION) at 06:51

## 2021-01-01 RX ADMIN — ATORVASTATIN CALCIUM 20 MG: 10 TABLET, FILM COATED ORAL at 20:59

## 2021-01-01 RX ADMIN — CHLORHEXIDINE GLUCONATE 15 ML: 1.2 SOLUTION ORAL at 21:11

## 2021-01-01 RX ADMIN — INSULIN LISPRO 5 UNITS: 100 INJECTION, SOLUTION INTRAVENOUS; SUBCUTANEOUS at 10:14

## 2021-01-01 RX ADMIN — SODIUM BICARBONATE 25 MEQ: 84 INJECTION INTRAVENOUS at 05:09

## 2021-01-01 RX ADMIN — GUAIFENESIN 1200 MG: 600 TABLET, EXTENDED RELEASE ORAL at 20:35

## 2021-01-01 RX ADMIN — FAMOTIDINE 20 MG: 20 TABLET, FILM COATED ORAL at 07:44

## 2021-01-01 RX ADMIN — ALBUTEROL SULFATE 2 PUFF: 108 AEROSOL, METERED RESPIRATORY (INHALATION) at 10:49

## 2021-01-01 RX ADMIN — GUAIFENESIN 400 MG: 100 SOLUTION ORAL at 00:02

## 2021-01-01 RX ADMIN — INSULIN HUMAN 12 UNITS: 100 INJECTION, SOLUTION PARENTERAL at 06:03

## 2021-01-01 RX ADMIN — INSULIN LISPRO 8 UNITS: 100 INJECTION, SOLUTION INTRAVENOUS; SUBCUTANEOUS at 07:47

## 2021-01-01 RX ADMIN — FENTANYL CITRATE 300 MCG/HR: 50 INJECTION, SOLUTION INTRAMUSCULAR; INTRAVENOUS at 15:07

## 2021-01-01 RX ADMIN — SODIUM CHLORIDE 50 ML/HR: 9 INJECTION, SOLUTION INTRAVENOUS at 11:23

## 2021-01-01 RX ADMIN — PROPOFOL 75 MCG/KG/MIN: 10 INJECTION, EMULSION INTRAVENOUS at 20:59

## 2021-01-01 RX ADMIN — Medication 0.2 MCG/KG/MIN: at 21:39

## 2021-01-01 RX ADMIN — LINAGLIPTIN 5 MG: 5 TABLET, FILM COATED ORAL at 08:00

## 2021-01-01 RX ADMIN — INSULIN DETEMIR 15 UNITS: 100 INJECTION, SOLUTION SUBCUTANEOUS at 08:01

## 2021-01-01 RX ADMIN — GUAIFENESIN 1200 MG: 600 TABLET, EXTENDED RELEASE ORAL at 20:40

## 2021-01-01 RX ADMIN — ENOXAPARIN SODIUM 40 MG: 40 INJECTION SUBCUTANEOUS at 20:23

## 2021-01-01 RX ADMIN — VASOPRESSIN 0.03 UNITS/MIN: 20 INJECTION INTRAVENOUS at 00:01

## 2021-01-01 RX ADMIN — ALBUTEROL SULFATE 2 PUFF: 108 AEROSOL, METERED RESPIRATORY (INHALATION) at 20:08

## 2021-01-01 RX ADMIN — MIDAZOLAM HYDROCHLORIDE 5 MG: 1 INJECTION, SOLUTION INTRAMUSCULAR; INTRAVENOUS at 09:51

## 2021-01-01 RX ADMIN — VASOPRESSIN 0.03 UNITS/MIN: 20 INJECTION INTRAVENOUS at 08:58

## 2021-01-01 RX ADMIN — PERFLUTREN 8.48 MG: 6.52 INJECTION, SUSPENSION INTRAVENOUS at 15:40

## 2021-01-01 RX ADMIN — SALINE NASAL SPRAY 1 SPRAY: 1.5 SOLUTION NASAL at 09:47

## 2021-01-01 RX ADMIN — ENOXAPARIN SODIUM 40 MG: 40 INJECTION SUBCUTANEOUS at 10:42

## 2021-01-01 RX ADMIN — GUAIFENESIN 400 MG: 100 SOLUTION ORAL at 17:21

## 2021-01-01 RX ADMIN — INSULIN LISPRO 10 UNITS: 100 INJECTION, SOLUTION INTRAVENOUS; SUBCUTANEOUS at 05:20

## 2021-01-01 RX ADMIN — FAMOTIDINE 20 MG: 20 TABLET, FILM COATED ORAL at 07:45

## 2021-01-01 RX ADMIN — HYDROCORTISONE SODIUM SUCCINATE 100 MG: 100 INJECTION, POWDER, FOR SOLUTION INTRAMUSCULAR; INTRAVENOUS at 09:45

## 2021-01-01 RX ADMIN — INSULIN HUMAN 16 UNITS: 100 INJECTION, SOLUTION PARENTERAL at 12:34

## 2021-01-01 RX ADMIN — INSULIN LISPRO 6 UNITS: 100 INJECTION, SOLUTION INTRAVENOUS; SUBCUTANEOUS at 17:19

## 2021-01-01 RX ADMIN — ENOXAPARIN SODIUM 40 MG: 40 INJECTION SUBCUTANEOUS at 08:49

## 2021-01-01 RX ADMIN — ALBUTEROL SULFATE 2 PUFF: 108 AEROSOL, METERED RESPIRATORY (INHALATION) at 20:47

## 2021-01-01 RX ADMIN — Medication 2000 UNITS: at 08:18

## 2021-01-01 RX ADMIN — ALBUTEROL SULFATE 2 PUFF: 108 AEROSOL, METERED RESPIRATORY (INHALATION) at 21:08

## 2021-01-01 RX ADMIN — PROPOFOL 75 MCG/KG/MIN: 10 INJECTION, EMULSION INTRAVENOUS at 13:01

## 2021-01-01 RX ADMIN — ALBUTEROL SULFATE 2 PUFF: 108 AEROSOL, METERED RESPIRATORY (INHALATION) at 06:40

## 2021-01-01 RX ADMIN — FAMOTIDINE 20 MG: 20 TABLET, FILM COATED ORAL at 18:57

## 2021-01-01 RX ADMIN — GUAIFENESIN 1200 MG: 600 TABLET, EXTENDED RELEASE ORAL at 21:43

## 2021-01-01 RX ADMIN — FAMOTIDINE 20 MG: 10 INJECTION, SOLUTION INTRAVENOUS at 08:17

## 2021-01-01 RX ADMIN — Medication 10 ML: at 08:18

## 2021-01-01 RX ADMIN — FUROSEMIDE 40 MG: 10 INJECTION, SOLUTION INTRAVENOUS at 16:54

## 2021-01-01 RX ADMIN — ALBUTEROL SULFATE 2 PUFF: 108 AEROSOL, METERED RESPIRATORY (INHALATION) at 11:27

## 2021-01-01 RX ADMIN — SODIUM ZIRCONIUM CYCLOSILICATE 10 G: 10 POWDER, FOR SUSPENSION ORAL at 17:17

## 2021-01-01 RX ADMIN — PROPOFOL 75 MCG/KG/MIN: 10 INJECTION, EMULSION INTRAVENOUS at 22:27

## 2021-01-01 RX ADMIN — DEXAMETHASONE 6 MG: 4 TABLET ORAL at 08:49

## 2021-01-01 RX ADMIN — GUAIFENESIN 1200 MG: 600 TABLET, EXTENDED RELEASE ORAL at 08:00

## 2021-01-01 RX ADMIN — MEROPENEM 1 G: 1 INJECTION, POWDER, FOR SOLUTION INTRAVENOUS at 10:54

## 2021-01-01 RX ADMIN — ENOXAPARIN SODIUM 40 MG: 40 INJECTION SUBCUTANEOUS at 21:41

## 2021-01-01 RX ADMIN — ALBUTEROL SULFATE 2 PUFF: 108 AEROSOL, METERED RESPIRATORY (INHALATION) at 07:20

## 2021-01-01 RX ADMIN — ALBUTEROL SULFATE 2 PUFF: 108 AEROSOL, METERED RESPIRATORY (INHALATION) at 18:26

## 2021-01-01 RX ADMIN — FAMOTIDINE 20 MG: 20 TABLET, FILM COATED ORAL at 08:00

## 2021-01-01 RX ADMIN — ZINC SULFATE 220 MG (50 MG) CAPSULE 220 MG: CAPSULE at 10:40

## 2021-01-01 RX ADMIN — ALBUTEROL SULFATE 2 PUFF: 108 AEROSOL, METERED RESPIRATORY (INHALATION) at 18:18

## 2021-01-01 RX ADMIN — Medication 2000 UNITS: at 08:34

## 2021-01-01 RX ADMIN — ZINC SULFATE 220 MG (50 MG) CAPSULE 220 MG: CAPSULE at 08:00

## 2021-01-01 RX ADMIN — PROPOFOL 75 MCG/KG/MIN: 10 INJECTION, EMULSION INTRAVENOUS at 00:08

## 2021-01-01 RX ADMIN — Medication 10 ML: at 20:34

## 2021-01-01 RX ADMIN — INSULIN LISPRO 12 UNITS: 100 INJECTION, SOLUTION INTRAVENOUS; SUBCUTANEOUS at 11:49

## 2021-01-01 RX ADMIN — PROPOFOL 75 MCG/KG/MIN: 10 INJECTION, EMULSION INTRAVENOUS at 10:45

## 2021-01-01 RX ADMIN — IOPAMIDOL 100 ML: 755 INJECTION, SOLUTION INTRAVENOUS at 20:09

## 2021-01-01 RX ADMIN — GUAIFENESIN 400 MG: 100 SOLUTION ORAL at 06:03

## 2021-01-01 RX ADMIN — ALBUTEROL SULFATE 2 PUFF: 108 AEROSOL, METERED RESPIRATORY (INHALATION) at 15:07

## 2021-01-01 RX ADMIN — Medication 2000 UNITS: at 08:16

## 2021-01-01 RX ADMIN — PROPOFOL 75 MCG/KG/MIN: 10 INJECTION, EMULSION INTRAVENOUS at 02:34

## 2021-01-01 RX ADMIN — Medication 10 ML: at 20:23

## 2021-01-01 RX ADMIN — Medication 2000 UNITS: at 08:49

## 2021-01-01 RX ADMIN — ENOXAPARIN SODIUM 40 MG: 40 INJECTION SUBCUTANEOUS at 08:00

## 2021-01-01 RX ADMIN — SODIUM ZIRCONIUM CYCLOSILICATE 10 G: 10 POWDER, FOR SUSPENSION ORAL at 21:13

## 2021-01-01 RX ADMIN — DEXAMETHASONE 6 MG: 4 TABLET ORAL at 07:46

## 2021-01-01 RX ADMIN — FENTANYL CITRATE 50 MCG/HR: 50 INJECTION, SOLUTION INTRAMUSCULAR; INTRAVENOUS at 11:34

## 2021-01-01 RX ADMIN — SODIUM CHLORIDE, POTASSIUM CHLORIDE, SODIUM LACTATE AND CALCIUM CHLORIDE 75 ML/HR: 600; 310; 30; 20 INJECTION, SOLUTION INTRAVENOUS at 18:49

## 2021-01-01 RX ADMIN — ALBUTEROL SULFATE 2 PUFF: 108 AEROSOL, METERED RESPIRATORY (INHALATION) at 07:06

## 2021-01-01 RX ADMIN — SODIUM CHLORIDE, POTASSIUM CHLORIDE, SODIUM LACTATE AND CALCIUM CHLORIDE 500 ML: 600; 310; 30; 20 INJECTION, SOLUTION INTRAVENOUS at 10:37

## 2021-01-01 RX ADMIN — MINERAL OIL AND WHITE PETROLATUM: 150; 830 OINTMENT OPHTHALMIC at 07:47

## 2021-01-01 RX ADMIN — OXYCODONE HYDROCHLORIDE AND ACETAMINOPHEN 500 MG: 500 TABLET ORAL at 08:17

## 2021-01-01 RX ADMIN — FAMOTIDINE 20 MG: 10 INJECTION, SOLUTION INTRAVENOUS at 10:29

## 2021-01-01 RX ADMIN — INSULIN HUMAN 16 UNITS: 100 INJECTION, SOLUTION PARENTERAL at 00:02

## 2021-01-01 RX ADMIN — INSULIN DETEMIR 5 UNITS: 100 INJECTION, SOLUTION SUBCUTANEOUS at 12:21

## 2021-01-01 RX ADMIN — ZINC SULFATE 220 MG (50 MG) CAPSULE 220 MG: CAPSULE at 08:01

## 2021-01-01 RX ADMIN — INSULIN HUMAN 5 UNITS: 100 INJECTION, SOLUTION PARENTERAL at 12:34

## 2021-01-01 RX ADMIN — PROPOFOL 75 MCG/KG/MIN: 10 INJECTION, EMULSION INTRAVENOUS at 16:09

## 2021-01-01 RX ADMIN — OXYCODONE HYDROCHLORIDE AND ACETAMINOPHEN 500 MG: 500 TABLET ORAL at 10:28

## 2021-01-01 RX ADMIN — BENZONATATE 200 MG: 100 CAPSULE ORAL at 02:08

## 2021-01-01 RX ADMIN — ALBUTEROL SULFATE 2 PUFF: 108 AEROSOL, METERED RESPIRATORY (INHALATION) at 20:10

## 2021-01-01 RX ADMIN — INSULIN DETEMIR 20 UNITS: 100 INJECTION, SOLUTION SUBCUTANEOUS at 21:44

## 2021-01-01 RX ADMIN — LINAGLIPTIN 5 MG: 5 TABLET, FILM COATED ORAL at 08:34

## 2021-01-01 RX ADMIN — GUAIFENESIN 400 MG: 100 SOLUTION ORAL at 05:21

## 2021-01-01 RX ADMIN — ENOXAPARIN SODIUM 40 MG: 40 INJECTION SUBCUTANEOUS at 20:31

## 2021-01-01 RX ADMIN — INSULIN HUMAN 5 UNITS: 100 INJECTION, SOLUTION PARENTERAL at 00:03

## 2021-01-01 RX ADMIN — DEXAMETHASONE 6 MG: 4 TABLET ORAL at 08:17

## 2021-01-01 RX ADMIN — INSULIN HUMAN 5 UNITS: 100 INJECTION, SOLUTION PARENTERAL at 06:04

## 2021-01-01 RX ADMIN — DEXAMETHASONE SODIUM PHOSPHATE 6 MG: 10 INJECTION INTRAMUSCULAR; INTRAVENOUS at 18:27

## 2021-01-01 RX ADMIN — OXYCODONE HYDROCHLORIDE AND ACETAMINOPHEN 500 MG: 500 TABLET ORAL at 08:34

## 2021-01-01 RX ADMIN — INSULIN LISPRO 5 UNITS: 100 INJECTION, SOLUTION INTRAVENOUS; SUBCUTANEOUS at 13:29

## 2021-01-01 RX ADMIN — ALBUTEROL SULFATE 2 PUFF: 108 AEROSOL, METERED RESPIRATORY (INHALATION) at 10:20

## 2021-01-01 RX ADMIN — Medication 2000 UNITS: at 08:00

## 2021-01-01 RX ADMIN — GUAIFENESIN 1200 MG: 600 TABLET, EXTENDED RELEASE ORAL at 11:18

## 2021-01-01 RX ADMIN — ALBUTEROL SULFATE 2 PUFF: 108 AEROSOL, METERED RESPIRATORY (INHALATION) at 09:15

## 2021-01-01 RX ADMIN — CHLORHEXIDINE GLUCONATE 15 ML: 1.2 SOLUTION ORAL at 20:01

## 2021-01-01 RX ADMIN — DEXAMETHASONE SODIUM PHOSPHATE 10 MG: 10 INJECTION, SOLUTION INTRAMUSCULAR; INTRAVENOUS at 22:02

## 2021-01-01 RX ADMIN — FAMOTIDINE 20 MG: 20 TABLET, FILM COATED ORAL at 17:19

## 2021-01-01 RX ADMIN — INSULIN LISPRO 5 UNITS: 100 INJECTION, SOLUTION INTRAVENOUS; SUBCUTANEOUS at 11:17

## 2021-01-01 RX ADMIN — Medication 0.02 MCG/KG/MIN: at 13:21

## 2021-01-01 RX ADMIN — Medication 0.22 MCG/KG/MIN: at 01:59

## 2021-01-01 RX ADMIN — PROPOFOL 75 MCG/KG/MIN: 10 INJECTION, EMULSION INTRAVENOUS at 19:25

## 2021-01-01 RX ADMIN — INSULIN LISPRO 6 UNITS: 100 INJECTION, SOLUTION INTRAVENOUS; SUBCUTANEOUS at 12:23

## 2021-01-01 RX ADMIN — SODIUM CHLORIDE 75 ML/HR: 9 INJECTION, SOLUTION INTRAVENOUS at 00:04

## 2021-01-01 RX ADMIN — FENTANYL CITRATE 300 MCG/HR: 50 INJECTION, SOLUTION INTRAMUSCULAR; INTRAVENOUS at 08:17

## 2021-01-01 RX ADMIN — GUAIFENESIN 1200 MG: 600 TABLET, EXTENDED RELEASE ORAL at 20:22

## 2021-01-01 RX ADMIN — FAMOTIDINE 20 MG: 20 TABLET, FILM COATED ORAL at 18:03

## 2021-01-01 RX ADMIN — INSULIN DETEMIR 15 UNITS: 100 INJECTION, SOLUTION SUBCUTANEOUS at 20:28

## 2021-01-01 RX ADMIN — PROPOFOL 75 MCG/KG/MIN: 10 INJECTION, EMULSION INTRAVENOUS at 14:46

## 2021-01-01 RX ADMIN — PROPOFOL 75 MCG/KG/MIN: 10 INJECTION, EMULSION INTRAVENOUS at 03:33

## 2021-01-01 RX ADMIN — PROPOFOL 25 MCG/KG/MIN: 10 INJECTION, EMULSION INTRAVENOUS at 10:00

## 2021-01-01 RX ADMIN — Medication 10 ML: at 20:01

## 2021-01-01 RX ADMIN — Medication 10 ML: at 10:41

## 2021-01-01 RX ADMIN — Medication 10 ML: at 08:01

## 2021-01-01 RX ADMIN — MINERAL OIL AND WHITE PETROLATUM: 150; 830 OINTMENT OPHTHALMIC at 11:18

## 2021-01-01 RX ADMIN — DEXAMETHASONE SODIUM PHOSPHATE 10 MG: 10 INJECTION, SOLUTION INTRAMUSCULAR; INTRAVENOUS at 08:19

## 2021-01-01 RX ADMIN — Medication 0.06 MCG/KG/MIN: at 10:46

## 2021-01-01 RX ADMIN — PROPOFOL 75 MCG/KG/MIN: 10 INJECTION, EMULSION INTRAVENOUS at 14:05

## 2021-01-01 RX ADMIN — INSULIN DETEMIR 20 UNITS: 100 INJECTION, SOLUTION SUBCUTANEOUS at 20:32

## 2021-01-01 RX ADMIN — PROPOFOL 75 MCG/KG/MIN: 10 INJECTION, EMULSION INTRAVENOUS at 15:39

## 2021-01-01 RX ADMIN — INSULIN DETEMIR 20 UNITS: 100 INJECTION, SOLUTION SUBCUTANEOUS at 08:17

## 2021-01-01 RX ADMIN — PROPOFOL 75 MCG/KG/MIN: 10 INJECTION, EMULSION INTRAVENOUS at 18:48

## 2021-01-01 RX ADMIN — SODIUM CHLORIDE 0.5 MCG/KG/MIN: 9 INJECTION, SOLUTION INTRAVENOUS at 11:27

## 2021-01-01 RX ADMIN — PROPOFOL 75 MCG/KG/MIN: 10 INJECTION, EMULSION INTRAVENOUS at 07:11

## 2021-01-01 RX ADMIN — INSULIN LISPRO 5 UNITS: 100 INJECTION, SOLUTION INTRAVENOUS; SUBCUTANEOUS at 08:18

## 2021-01-01 RX ADMIN — PROPOFOL 75 MCG/KG/MIN: 10 INJECTION, EMULSION INTRAVENOUS at 01:19

## 2021-01-01 RX ADMIN — ATORVASTATIN CALCIUM 20 MG: 10 TABLET, FILM COATED ORAL at 21:43

## 2021-01-01 RX ADMIN — PROPOFOL 75 MCG/KG/MIN: 10 INJECTION, EMULSION INTRAVENOUS at 06:58

## 2021-01-01 RX ADMIN — Medication 10 ML: at 08:50

## 2021-01-01 RX ADMIN — FAMOTIDINE 20 MG: 20 TABLET, FILM COATED ORAL at 17:17

## 2021-01-01 RX ADMIN — INSULIN LISPRO 8 UNITS: 100 INJECTION, SOLUTION INTRAVENOUS; SUBCUTANEOUS at 06:47

## 2021-01-01 RX ADMIN — PROPOFOL 75 MCG/KG/MIN: 10 INJECTION, EMULSION INTRAVENOUS at 17:17

## 2021-01-01 RX ADMIN — DEXAMETHASONE SODIUM PHOSPHATE 6 MG: 4 INJECTION, SOLUTION INTRA-ARTICULAR; INTRALESIONAL; INTRAMUSCULAR; INTRAVENOUS; SOFT TISSUE at 10:28

## 2021-01-01 RX ADMIN — ENOXAPARIN SODIUM 40 MG: 40 INJECTION SUBCUTANEOUS at 20:41

## 2021-01-01 RX ADMIN — FUROSEMIDE 40 MG: 10 INJECTION INTRAMUSCULAR; INTRAVENOUS at 10:48

## 2021-01-01 RX ADMIN — INSULIN DETEMIR 10 UNITS: 100 INJECTION, SOLUTION SUBCUTANEOUS at 20:44

## 2021-01-01 RX ADMIN — OXYCODONE HYDROCHLORIDE AND ACETAMINOPHEN 500 MG: 500 TABLET ORAL at 08:00

## 2021-01-01 RX ADMIN — INSULIN DETEMIR 20 UNITS: 100 INJECTION, SOLUTION SUBCUTANEOUS at 08:10

## 2021-01-01 RX ADMIN — PROPOFOL 75 MCG/KG/MIN: 10 INJECTION, EMULSION INTRAVENOUS at 11:37

## 2021-01-01 RX ADMIN — INSULIN DETEMIR 10 UNITS: 100 INJECTION, SOLUTION SUBCUTANEOUS at 08:33

## 2021-01-01 RX ADMIN — ALBUTEROL SULFATE 2 PUFF: 108 AEROSOL, METERED RESPIRATORY (INHALATION) at 11:25

## 2021-01-01 RX ADMIN — LINAGLIPTIN 5 MG: 5 TABLET, FILM COATED ORAL at 13:29

## 2021-01-01 RX ADMIN — PROPOFOL 75 MCG/KG/MIN: 10 INJECTION, EMULSION INTRAVENOUS at 17:50

## 2021-01-01 RX ADMIN — INSULIN HUMAN 16 UNITS: 100 INJECTION, SOLUTION PARENTERAL at 23:28

## 2021-01-01 RX ADMIN — CHLORHEXIDINE GLUCONATE 15 ML: 1.2 SOLUTION ORAL at 08:19

## 2021-01-01 RX ADMIN — ROCURONIUM BROMIDE 100 MG: 10 INJECTION, SOLUTION INTRAVENOUS at 09:53

## 2021-01-01 RX ADMIN — INSULIN LISPRO 8 UNITS: 100 INJECTION, SOLUTION INTRAVENOUS; SUBCUTANEOUS at 12:22

## 2021-01-01 RX ADMIN — SODIUM CHLORIDE 75 ML/HR: 9 INJECTION, SOLUTION INTRAVENOUS at 05:13

## 2021-01-01 RX ADMIN — MINERAL OIL AND WHITE PETROLATUM: 150; 830 OINTMENT OPHTHALMIC at 08:01

## 2021-01-01 RX ADMIN — INSULIN LISPRO 12 UNITS: 100 INJECTION, SOLUTION INTRAVENOUS; SUBCUTANEOUS at 17:22

## 2021-01-01 RX ADMIN — INSULIN DETEMIR 5 UNITS: 100 INJECTION, SOLUTION SUBCUTANEOUS at 20:39

## 2021-01-01 RX ADMIN — FUROSEMIDE 40 MG: 10 INJECTION, SOLUTION INTRAVENOUS at 10:48

## 2021-01-01 RX ADMIN — OXYCODONE HYDROCHLORIDE AND ACETAMINOPHEN 500 MG: 500 TABLET ORAL at 08:18

## 2021-01-01 RX ADMIN — INSULIN HUMAN 12 UNITS: 100 INJECTION, SOLUTION PARENTERAL at 17:21

## 2021-01-01 RX ADMIN — ENOXAPARIN SODIUM 40 MG: 40 INJECTION SUBCUTANEOUS at 08:35

## 2021-01-01 RX ADMIN — ALBUTEROL SULFATE 2 PUFF: 108 AEROSOL, METERED RESPIRATORY (INHALATION) at 07:30

## 2021-01-01 RX ADMIN — Medication 10 ML: at 20:41

## 2021-01-01 RX ADMIN — ALBUTEROL SULFATE 2 PUFF: 108 AEROSOL, METERED RESPIRATORY (INHALATION) at 10:50

## 2021-01-01 RX ADMIN — ALBUTEROL SULFATE 2 PUFF: 108 AEROSOL, METERED RESPIRATORY (INHALATION) at 20:28

## 2021-01-01 RX ADMIN — INSULIN LISPRO 8 UNITS: 100 INJECTION, SOLUTION INTRAVENOUS; SUBCUTANEOUS at 06:42

## 2021-01-01 RX ADMIN — Medication 0.3 MCG/KG/MIN: at 09:42

## 2021-01-01 RX ADMIN — ATORVASTATIN CALCIUM 10 MG: 10 TABLET, FILM COATED ORAL at 20:41

## 2021-01-01 RX ADMIN — INSULIN LISPRO 10 UNITS: 100 INJECTION, SOLUTION INTRAVENOUS; SUBCUTANEOUS at 11:14

## 2021-01-01 RX ADMIN — INSULIN HUMAN 5 UNITS: 100 INJECTION, SOLUTION PARENTERAL at 00:30

## 2021-01-01 RX ADMIN — ALBUTEROL SULFATE 2 PUFF: 108 AEROSOL, METERED RESPIRATORY (INHALATION) at 14:52

## 2021-01-01 RX ADMIN — ATORVASTATIN CALCIUM 10 MG: 10 TABLET, FILM COATED ORAL at 20:40

## 2021-01-01 RX ADMIN — SODIUM BICARBONATE 75 MEQ: 84 INJECTION INTRAVENOUS at 05:10

## 2021-01-01 RX ADMIN — PROPOFOL 75 MCG/KG/MIN: 10 INJECTION, EMULSION INTRAVENOUS at 23:02

## 2021-01-01 RX ADMIN — Medication 2000 UNITS: at 10:28

## 2021-01-01 RX ADMIN — PROPOFOL 75 MCG/KG/MIN: 10 INJECTION, EMULSION INTRAVENOUS at 12:36

## 2021-01-01 RX ADMIN — PROPOFOL 75 MCG/KG/MIN: 10 INJECTION, EMULSION INTRAVENOUS at 00:56

## 2021-01-01 RX ADMIN — ATORVASTATIN CALCIUM 20 MG: 10 TABLET, FILM COATED ORAL at 21:11

## 2021-01-01 RX ADMIN — ENOXAPARIN SODIUM 40 MG: 40 INJECTION SUBCUTANEOUS at 00:16

## 2021-01-01 RX ADMIN — LINAGLIPTIN 5 MG: 5 TABLET, FILM COATED ORAL at 08:01

## 2021-01-01 RX ADMIN — FENTANYL CITRATE 300 MCG/HR: 50 INJECTION, SOLUTION INTRAMUSCULAR; INTRAVENOUS at 05:21

## 2021-01-01 RX ADMIN — BENZONATATE 200 MG: 100 CAPSULE ORAL at 01:08

## 2021-01-01 RX ADMIN — ALBUTEROL SULFATE 2 PUFF: 108 AEROSOL, METERED RESPIRATORY (INHALATION) at 14:57

## 2021-01-01 RX ADMIN — PROPOFOL 75 MCG/KG/MIN: 10 INJECTION, EMULSION INTRAVENOUS at 20:14

## 2021-01-01 RX ADMIN — SODIUM BICARBONATE 50 MEQ: 84 INJECTION INTRAVENOUS at 04:26

## 2021-01-01 RX ADMIN — ALBUTEROL SULFATE 2 PUFF: 108 AEROSOL, METERED RESPIRATORY (INHALATION) at 10:45

## 2021-01-01 RX ADMIN — DEXTROSE MONOHYDRATE 50 ML: 500 INJECTION PARENTERAL at 05:11

## 2021-01-01 RX ADMIN — ENOXAPARIN SODIUM 40 MG: 40 INJECTION SUBCUTANEOUS at 20:01

## 2021-01-01 RX ADMIN — INSULIN DETEMIR 5 UNITS: 100 INJECTION, SOLUTION SUBCUTANEOUS at 08:19

## 2021-01-01 RX ADMIN — OXYCODONE HYDROCHLORIDE AND ACETAMINOPHEN 500 MG: 500 TABLET ORAL at 08:49

## 2021-01-01 RX ADMIN — HEPARIN SODIUM 3200 UNITS: 1000 INJECTION, SOLUTION INTRAVENOUS; SUBCUTANEOUS at 19:10

## 2021-01-01 RX ADMIN — ALBUTEROL SULFATE 2 PUFF: 108 AEROSOL, METERED RESPIRATORY (INHALATION) at 07:04

## 2021-01-01 RX ADMIN — ETOMIDATE 20 MG: 2 INJECTION, SOLUTION INTRAVENOUS at 09:52

## 2021-01-01 RX ADMIN — Medication 10 ML: at 08:35

## 2021-01-01 RX ADMIN — SODIUM ZIRCONIUM CYCLOSILICATE 10 G: 10 POWDER, FOR SUSPENSION ORAL at 12:30

## 2021-01-01 RX ADMIN — FENTANYL CITRATE 300 MCG/HR: 50 INJECTION, SOLUTION INTRAMUSCULAR; INTRAVENOUS at 00:14

## 2021-01-01 RX ADMIN — GUAIFENESIN 400 MG: 100 SOLUTION ORAL at 23:22

## 2021-01-01 RX ADMIN — FAMOTIDINE 20 MG: 20 TABLET, FILM COATED ORAL at 08:17

## 2021-01-01 RX ADMIN — ATORVASTATIN CALCIUM 20 MG: 10 TABLET, FILM COATED ORAL at 20:34

## 2021-01-01 RX ADMIN — PROPOFOL 75 MCG/KG/MIN: 10 INJECTION, EMULSION INTRAVENOUS at 02:08

## 2021-01-01 RX ADMIN — TOCILIZUMAB 800 MG: 20 INJECTION, SOLUTION, CONCENTRATE INTRAVENOUS at 12:21

## 2021-01-01 RX ADMIN — Medication 10 ML: at 21:46

## 2021-01-01 RX ADMIN — ZINC SULFATE 220 MG (50 MG) CAPSULE 220 MG: CAPSULE at 08:49

## 2021-01-01 RX ADMIN — PROPOFOL 75 MCG/KG/MIN: 10 INJECTION, EMULSION INTRAVENOUS at 05:30

## 2021-01-01 RX ADMIN — PROPOFOL 75 MCG/KG/MIN: 10 INJECTION, EMULSION INTRAVENOUS at 09:23

## 2021-01-01 RX ADMIN — MEROPENEM 1 G: 1 INJECTION, POWDER, FOR SOLUTION INTRAVENOUS at 21:12

## 2021-01-01 RX ADMIN — ALBUTEROL SULFATE 2 PUFF: 108 AEROSOL, METERED RESPIRATORY (INHALATION) at 14:40

## 2021-01-01 RX ADMIN — Medication 10 ML: at 21:13

## 2021-01-01 RX ADMIN — DEXTROMETHORPHAN 60 MG: 30 SUSPENSION, EXTENDED RELEASE ORAL at 14:20

## 2021-01-01 RX ADMIN — ZINC SULFATE 220 MG (50 MG) CAPSULE 220 MG: CAPSULE at 08:34

## 2021-01-01 RX ADMIN — ALBUTEROL SULFATE 2 PUFF: 108 AEROSOL, METERED RESPIRATORY (INHALATION) at 10:47

## 2021-01-01 RX ADMIN — INSULIN DETEMIR 20 UNITS: 100 INJECTION, SOLUTION SUBCUTANEOUS at 20:02

## 2021-01-01 RX ADMIN — INSULIN LISPRO 10 UNITS: 100 INJECTION, SOLUTION INTRAVENOUS; SUBCUTANEOUS at 18:03

## 2021-01-01 RX ADMIN — Medication 0.26 MCG/KG/MIN: at 06:03

## 2021-01-01 RX ADMIN — SODIUM CHLORIDE 50 ML/HR: 9 INJECTION, SOLUTION INTRAVENOUS at 23:18

## 2021-01-01 RX ADMIN — ALBUTEROL SULFATE 2 PUFF: 108 AEROSOL, METERED RESPIRATORY (INHALATION) at 08:07

## 2021-01-01 RX ADMIN — CALCIUM CHLORIDE 1 G: 100 INJECTION INTRAVENOUS; INTRAVENTRICULAR at 05:06

## 2021-01-01 RX ADMIN — SALINE NASAL SPRAY 1 SPRAY: 1.5 SOLUTION NASAL at 14:18

## 2021-01-01 RX ADMIN — ZINC SULFATE 220 MG (50 MG) CAPSULE 220 MG: CAPSULE at 08:19

## 2021-01-01 RX ADMIN — PHENYLEPHRINE HYDROCHLORIDE 0.5 MCG/KG/MIN: 10 INJECTION INTRAVENOUS at 10:00

## 2021-01-01 RX ADMIN — INSULIN LISPRO 12 UNITS: 100 INJECTION, SOLUTION INTRAVENOUS; SUBCUTANEOUS at 16:45

## 2021-01-01 RX ADMIN — ALBUTEROL SULFATE 2 PUFF: 108 AEROSOL, METERED RESPIRATORY (INHALATION) at 16:15

## 2021-01-01 RX ADMIN — INSULIN LISPRO 12 UNITS: 100 INJECTION, SOLUTION INTRAVENOUS; SUBCUTANEOUS at 16:55

## 2021-01-01 RX ADMIN — FENTANYL CITRATE 300 MCG/HR: 50 INJECTION, SOLUTION INTRAMUSCULAR; INTRAVENOUS at 19:25

## 2021-01-01 RX ADMIN — ALBUTEROL SULFATE 2 PUFF: 108 AEROSOL, METERED RESPIRATORY (INHALATION) at 14:47

## 2021-01-01 RX ADMIN — Medication 10 ML: at 00:04

## 2021-01-01 RX ADMIN — ENOXAPARIN SODIUM 40 MG: 40 INJECTION SUBCUTANEOUS at 20:44

## 2021-01-01 RX ADMIN — FAMOTIDINE 20 MG: 20 TABLET, FILM COATED ORAL at 16:44

## 2021-01-01 RX ADMIN — PROPOFOL 75 MCG/KG/MIN: 10 INJECTION, EMULSION INTRAVENOUS at 08:04

## 2021-01-01 RX ADMIN — ATORVASTATIN CALCIUM 10 MG: 10 TABLET, FILM COATED ORAL at 20:22

## 2021-01-01 RX ADMIN — PROPOFOL 75 MCG/KG/MIN: 10 INJECTION, EMULSION INTRAVENOUS at 10:39

## 2021-01-01 RX ADMIN — DEXAMETHASONE SODIUM PHOSPHATE 6 MG: 4 INJECTION, SOLUTION INTRA-ARTICULAR; INTRALESIONAL; INTRAMUSCULAR; INTRAVENOUS; SOFT TISSUE at 20:59

## 2021-10-12 PROBLEM — J96.01 ACUTE RESPIRATORY FAILURE WITH HYPOXIA (HCC): Status: ACTIVE | Noted: 2021-01-01

## 2021-10-13 PROBLEM — U07.1 PNEUMONIA DUE TO COVID-19 VIRUS: Status: ACTIVE | Noted: 2021-01-01

## 2021-10-13 PROBLEM — J12.82 PNEUMONIA DUE TO COVID-19 VIRUS: Status: ACTIVE | Noted: 2021-01-01

## 2021-10-13 NOTE — ED PROVIDER NOTES
Subjective   Patient is a 40-year-old male presents to the ER via EMS due to shortness of breath secondary to Covid.  Patient states he began developing Covid symptoms 9 days ago and had a positive Covid swab the following day.  Patient has had cough with congestion, fever, and diarrhea.  Patient states today he began feeling very short of breath.  He was noted to be hypoxic at 79%.  Patient is on 5 to 6 L of oxygen with the oxygen saturation of 84%.  Patient denies any chest pain.  He states he typically does not require oxygen.  Patient has been on no medications for Covid.          Review of Systems   Constitutional: Positive for fever.   HENT: Positive for congestion.    Eyes: Negative.    Respiratory: Positive for cough and shortness of breath.    Cardiovascular: Negative.  Negative for chest pain.   Gastrointestinal: Positive for diarrhea. Negative for abdominal pain, constipation, nausea and vomiting.   Genitourinary: Negative.    Musculoskeletal: Positive for myalgias.   Skin: Negative.    Neurological: Positive for headaches.   All other systems reviewed and are negative.      No past medical history on file.    No Known Allergies    No past surgical history on file.    No family history on file.    Social History     Socioeconomic History   • Marital status: Single           Objective   Physical Exam  Vitals and nursing note reviewed.   Constitutional:       General: He is not in acute distress.     Appearance: He is well-developed. He is not diaphoretic.   HENT:      Head: Normocephalic and atraumatic.      Nose: Nose normal.      Mouth/Throat:      Mouth: Mucous membranes are moist.   Eyes:      General: No scleral icterus.     Conjunctiva/sclera: Conjunctivae normal.      Pupils: Pupils are equal, round, and reactive to light.   Neck:      Thyroid: No thyromegaly.      Vascular: No JVD.   Cardiovascular:      Rate and Rhythm: Normal rate and regular rhythm.      Heart sounds: Normal heart sounds. No  murmur heard.      Pulmonary:      Effort: Pulmonary effort is normal. No respiratory distress.      Breath sounds: Rales present. No wheezing.      Comments: Patient is surprisingly in no significant respiratory distress  Chest:      Chest wall: No tenderness.   Abdominal:      General: Bowel sounds are normal. There is no distension.      Palpations: Abdomen is soft. There is no mass.      Tenderness: There is no abdominal tenderness. There is no guarding or rebound.   Musculoskeletal:         General: Normal range of motion.      Cervical back: Normal range of motion and neck supple.   Lymphadenopathy:      Cervical: No cervical adenopathy.   Skin:     General: Skin is warm and dry.      Capillary Refill: Capillary refill takes less than 2 seconds.      Coloration: Skin is not pale.      Findings: No erythema or rash.   Neurological:      General: No focal deficit present.      Mental Status: He is alert and oriented to person, place, and time.      Cranial Nerves: No cranial nerve deficit.      Coordination: Coordination normal.      Deep Tendon Reflexes: Reflexes are normal and symmetric.   Psychiatric:         Mood and Affect: Mood normal.         Behavior: Behavior normal.         Thought Content: Thought content normal.         Judgment: Judgment normal.         Procedures           ED Course  ED Course as of 10/12/21 2300   Tue Oct 12, 2021   1944 Work up remains pending including CTA of the chest. This will be a turn over for Reji CALLAWAY.  [TW]   7405 He denies vaccination.  He did not receive Regeneron. CTA of the chest - IMPRESSION:  1. No gross evidence of pulmonary thromboembolic disease. There is respiratory motion on today's exam and obscuring some of the more distal segmental and subsegmental pulmonary artery branches. 2. The thoracic aorta and proximal great vessels are unremarkable. 3. Rather extensive mixed groundglass and alveolar consolidation within both lungs consistent with rather  severe Covid pneumonia.  Chest xray - IMPRESSION:  Bilateral groundglass parenchymal infiltrates worrisome for Covid 19 infection.  Lab work also reviewed.  I spoke with Dr Harden, hospitalist on call.  He will be admitted to their service.    [KS]      ED Course User Index  [KS] Reji Khanna, CESIA  [TW] Meena Singleton APRN                                           MDM  Number of Diagnoses or Management Options  Acute respiratory failure with hypoxia (HCC): new and requires workup  COVID-19: new and requires workup     Amount and/or Complexity of Data Reviewed  Clinical lab tests: ordered and reviewed  Tests in the radiology section of CPT®: ordered and reviewed  Discuss the patient with other providers: yes    Risk of Complications, Morbidity, and/or Mortality  Presenting problems: moderate  Diagnostic procedures: low  Management options: moderate        Final diagnoses:   Acute respiratory failure with hypoxia (HCC)   COVID-19       ED Disposition  ED Disposition     ED Disposition Condition Comment    Decision to Admit  Level of Care: Critical Care [6]   Diagnosis: Acute respiratory failure with hypoxia (HCC) [419029]   Admitting Physician: JANETT HARDEN [[id number]]   Attending Physician: JANETT HARDEN [[id number]]   Isolate for COVID?: Yes [1]   Certification: I Certify That Inpatient Hospital Services Are Medically Necessary For Greater Than 2 Midnights            No follow-up provider specified.       Medication List      No changes were made to your prescriptions during this visit.          Reji Khanna, CESIA  10/12/21 0280

## 2021-10-13 NOTE — PROGRESS NOTES
Pharmacy Consult: Tocilizumab Dosing  Abebe Esquivel is a 40 y.o. male currently hospitalized for COVID-19 pneumonia. Pharmacy has been consulted by Dr Gonzalez to dose tocilizumab for COVID-19.     Relevant Concomitant Medications (antibiotics/antifungals/antivirals/steroids):  dexamethasone     Patient meets the following inclusion criteria:  Hospitalized within 72 hours of treatment consideration with confirmed COVID-19 infection. Date of positive SARS-CoV-2 test: 10/12/2021   Date of Hospital Admission: 10/12/2021  Receiving high-flow NC or NIVM with rapidly progressive illness and evidence of systemic inflammation OR within 24 hours of ICU admission and requiring IMV or ECMO.       Patient does not meet the following exclusion criteria:  High concern for or presence of bacterial or fungal coinfection  Patient is receiving baricitinib or other immunomodulators (other than corticosteroids)  Patient/family is considering or has decided to proceed with palliative care.       Plan:  Proceed with tocilizumab 800mg (8 mg/kg, rounded to nearest vial size; max dose 800mg) x 1 dose, which will be ordered by pharmacy.   Monitor clinical status and LFTs.     Thank you for this consult. Please contact pharmacy with any questions or concerns.     Ben Baiely, PharmD  10/13/21 10:54 CDT

## 2021-10-13 NOTE — PLAN OF CARE
Problem: Adult Inpatient Plan of Care  Goal: Plan of Care Review  Outcome: Ongoing, Progressing     Problem: Skin Injury Risk Increased  Goal: Skin Health and Integrity  Outcome: Ongoing, Progressing     Problem: Fall Injury Risk  Goal: Absence of Fall and Fall-Related Injury  Outcome: Ongoing, Progressing   Goal Outcome Evaluation:

## 2021-10-13 NOTE — PROGRESS NOTES
[facility] Medicine Services  INPATIENT PROGRESS NOTE    Length of Stay: 1  Date of Admission: 10/12/2021  Primary Care Physician: Maris Littlejohn DO    Subjective   Chief Complaint: Covid pneumonia/respiratory failure/morbid obesity.    HPI   Patient is currently maxing out on Vapotherm.  Patient does not seem to be acute distress.  T-max 99.8.  T-current 97.8.  Slightly high blood pressure.  Kidney functions improving.    Review of Systems   Constitutional: Positive for activity change, appetite change and fatigue. Negative for chills and fever.   HENT: Negative for hearing loss, nosebleeds, tinnitus and trouble swallowing.    Eyes: Negative for visual disturbance.   Respiratory: Positive for cough and shortness of breath. Negative for chest tightness and wheezing.    Cardiovascular: Negative for chest pain, palpitations and leg swelling.   Gastrointestinal: Negative for abdominal distention, abdominal pain, blood in stool, constipation, diarrhea, nausea and vomiting.   Endocrine: Negative for cold intolerance, heat intolerance, polydipsia, polyphagia and polyuria.   Genitourinary: Negative for decreased urine volume, difficulty urinating, dysuria, flank pain, frequency and hematuria.   Musculoskeletal: Negative for arthralgias, joint swelling and myalgias.   Skin: Negative for rash.   Allergic/Immunologic: Negative for immunocompromised state.   Neurological: Negative for dizziness, syncope, weakness, light-headedness and headaches.   Hematological: Negative for adenopathy. Does not bruise/bleed easily.   Psychiatric/Behavioral: Negative for confusion and sleep disturbance. The patient is not nervous/anxious.           All pertinent negatives and positives are as above. All other systems have been reviewed and are negative unless otherwise stated.     Objective    Temp:  [97.8 °F (36.6 °C)-99.8 °F (37.7 °C)] 97.8 °F (36.6 °C)  Heart Rate:  [] 76  Resp:  [18-24]  20  BP: (106-154)/() 150/95    Intake/Output Summary (Last 24 hours) at 10/13/2021 1035  Last data filed at 10/13/2021 0845  Gross per 24 hour   Intake 789 ml   Output 825 ml   Net -36 ml     Physical Exam  Vitals and nursing note reviewed.   Constitutional:       Appearance: He is well-developed.   HENT:      Head: Normocephalic.   Eyes:      Conjunctiva/sclera: Conjunctivae normal.      Pupils: Pupils are equal, round, and reactive to light.   Neck:      Vascular: No JVD.   Cardiovascular:      Rate and Rhythm: Normal rate and regular rhythm.      Heart sounds: Normal heart sounds. No murmur heard.  No friction rub. No gallop.    Pulmonary:      Effort: No respiratory distress.      Breath sounds: No wheezing or rales.      Comments: On Vapotherm.  Decrease in breath sound bilateral, clear.  Chest:      Chest wall: No tenderness.   Abdominal:      General: Bowel sounds are normal. There is no distension.      Palpations: Abdomen is soft.      Tenderness: There is no abdominal tenderness. There is no guarding or rebound.      Comments: Morbid obesity.  BMI is 44.   Musculoskeletal:         General: No tenderness or deformity. Normal range of motion.      Cervical back: Neck supple.   Skin:     General: Skin is warm and dry.      Capillary Refill: Capillary refill takes 2 to 3 seconds.      Findings: No rash.   Neurological:      Mental Status: He is alert and oriented to person, place, and time.      Cranial Nerves: No cranial nerve deficit.      Motor: Weakness present. No abnormal muscle tone.      Coordination: Coordination abnormal.      Gait: Gait abnormal.      Deep Tendon Reflexes: Reflexes normal.   Psychiatric:         Behavior: Behavior normal.         Thought Content: Thought content normal.         Results Review:  Lab Results (last 24 hours)     Procedure Component Value Units Date/Time    POC Glucose Once [[id number]]  (Abnormal) Collected: 10/13/21 1011    Specimen: Blood Updated: 10/13/21 1023      Glucose 335 mg/dL      Comment: : [id number] Jn AnnaMeter ID: [id number]       C-reactive Protein [[id number]]  (Abnormal) Collected: 10/13/21 0321    Specimen: Blood Updated: 10/13/21 0910     C-Reactive Protein 9.37 mg/dL     Comprehensive Metabolic Panel [[id number]]  (Abnormal) Collected: 10/13/21 0321    Specimen: Blood Updated: 10/13/21 0422     Glucose 282 mg/dL      BUN 22 mg/dL      Creatinine 1.18 mg/dL      Sodium 130 mmol/L      Potassium 3.7 mmol/L      Chloride 96 mmol/L      CO2 22.0 mmol/L      Calcium 8.5 mg/dL      Total Protein 7.3 g/dL      Albumin 3.50 g/dL      ALT (SGPT) 20 U/L      AST (SGOT) 35 U/L      Alkaline Phosphatase 56 U/L      Total Bilirubin 0.5 mg/dL      eGFR Non African Amer 68 mL/min/1.73      Globulin 3.8 gm/dL      A/G Ratio 0.9 g/dL      BUN/Creatinine Ratio 18.6     Anion Gap 12.0 mmol/L     Narrative:      GFR Normal >60  Chronic Kidney Disease <60  Kidney Failure <15      Troponin [[id number]]  (Normal) Collected: 10/13/21 0321    Specimen: Blood Updated: 10/13/21 0422     Troponin T <0.010 ng/mL     Narrative:      Troponin T Reference Range:  <= 0.03 ng/mL-   Negative for AMI  >0.03 ng/mL-     Abnormal for myocardial necrosis.  Clinicians would have to utilize clinical acumen, EKG, Troponin and serial changes to determine if it is an Acute Myocardial Infarction or myocardial injury due to an underlying chronic condition.       Results may be falsely decreased if patient taking Biotin.      Phosphorus [[id number]]  (Normal) Collected: 10/13/21 0321    Specimen: Blood Updated: 10/13/21 0422     Phosphorus 3.5 mg/dL     Magnesium [[id number]]  (Normal) Collected: 10/13/21 0321    Specimen: Blood Updated: 10/13/21 0422     Magnesium 2.3 mg/dL     Ferritin [[id number]]  (Abnormal) Collected: 10/13/21 0321    Specimen: Blood Updated: 10/13/21 0422     Ferritin 1,243.00 ng/mL     Narrative:      Results may be falsely decreased if patient taking Biotin.      CBC Auto  Differential [[id number]]  (Abnormal) Collected: 10/13/21 0321    Specimen: Blood Updated: 10/13/21 0345     WBC 6.00 10*3/mm3      RBC 5.19 10*6/mm3      Hemoglobin 14.6 g/dL      Hematocrit 43.2 %      MCV 83.2 fL      MCH 28.1 pg      MCHC 33.8 g/dL      RDW 13.8 %      RDW-SD 41.6 fl      MPV 10.2 fL      Platelets 178 10*3/mm3      Neutrophil % 68.6 %      Lymphocyte % 18.2 %      Monocyte % 11.7 %      Eosinophil % 0.0 %      Basophil % 0.2 %      Immature Grans % 1.3 %      Neutrophils, Absolute 4.12 10*3/mm3      Lymphocytes, Absolute 1.09 10*3/mm3      Monocytes, Absolute 0.70 10*3/mm3      Eosinophils, Absolute 0.00 10*3/mm3      Basophils, Absolute 0.01 10*3/mm3      Immature Grans, Absolute 0.08 10*3/mm3      nRBC 0.0 /100 WBC     Blood Culture - Blood, Arm, Left [[id number]] Collected: 10/12/21 1830    Specimen: Blood from Arm, Left Updated: 10/12/21 1856    Blood Culture - Blood, Arm, Left [[id number]] Collected: 10/12/21 1755    Specimen: Blood from Arm, Left Updated: 10/12/21 1856    COVID-19,[facility] IN-HOUSE,Nasal Swab No Transport Media 3-4 HR TAT - Swab, Nasal Cavity [[id number]]  (Abnormal) Collected: 10/12/21 1757    Specimen: Swab from Nasal Cavity Updated: 10/12/21 1855     COVID19 Detected    Narrative:      Fact sheet for providers: https://www.fda.gov/media/223757/download     Fact sheet for patients: https://www.fda.gov/media/059776/download    Test performed by PCR.    Consider negative results in combination with clinical observations, patient history, and epidemiological information.    Procalcitonin [[id number]]  (Abnormal) Collected: 10/12/21 1755    Specimen: Blood Updated: 10/12/21 1830     Procalcitonin 0.33 ng/mL     Narrative:      As a Marker for Sepsis (Non-Neonates):     1. <0.5 ng/mL represents a low risk of severe sepsis and/or septic shock.  2. >2 ng/mL represents a high risk of severe sepsis and/or septic shock.    As a Marker for Lower Respiratory Tract Infections that  "require antibiotic therapy:  PCT on Admission     Antibiotic Therapy             6-12 Hrs later  >0.5                          Strongly Recommended            >0.25 - <0.5             Recommended  0.1 - 0.25                  Discouraged                       Remeasure/reassess PCT  <0.1                         Strongly Discouraged         Remeasure/reassess PCT      As 28 day mortality risk marker: \"Change in Procalcitonin Result\" (>80% or <=80%) if Day 0 (or Day 1) and Day 4 values are available. Refer to [email or web address]    Change in PCT <=80 %   A decrease of PCT levels below or equal to 80% defines a positive change in PCT test result representing a higher risk for 28-day all-cause mortality of patients diagnosed with severe sepsis or septic shock.    Change in PCT >80 %   A decrease of PCT levels of more than 80% defines a negative change in PCT result representing a lower risk for 28-day all-cause mortality of patients diagnosed with severe sepsis or septic shock.                Ferritin [[id number]]  (Abnormal) Collected: 10/12/21 1755    Specimen: Blood Updated: 10/12/21 1830     Ferritin 1,328.00 ng/mL     Narrative:      Results may be falsely decreased if patient taking Biotin.      Comprehensive Metabolic Panel [[id number]]  (Abnormal) Collected: 10/12/21 1755    Specimen: Blood Updated: 10/12/21 1825     Glucose 214 mg/dL      BUN 21 mg/dL      Creatinine 1.31 mg/dL      Sodium 130 mmol/L      Potassium 3.6 mmol/L      Chloride 94 mmol/L      CO2 25.0 mmol/L      Calcium 8.5 mg/dL      Total Protein 7.6 g/dL      Albumin 3.50 g/dL      ALT (SGPT) 21 U/L      AST (SGOT) 37 U/L      Alkaline Phosphatase 58 U/L      Total Bilirubin 0.6 mg/dL      eGFR Non African Amer 61 mL/min/1.73      Globulin 4.1 gm/dL      A/G Ratio 0.9 g/dL      BUN/Creatinine Ratio 16.0     Anion Gap 11.0 mmol/L     Narrative:      GFR Normal >60  Chronic Kidney Disease <60  Kidney Failure <15      Lactate " Dehydrogenase [[id number]]  (Abnormal) Collected: 10/12/21 1755    Specimen: Blood Updated: 10/12/21 1824      U/L     Lactic Acid, Plasma [[id number]]  (Normal) Collected: 10/12/21 1755    Specimen: Blood Updated: 10/12/21 1821     Lactate 1.7 mmol/L     Troponin [[id number]]  (Normal) Collected: 10/12/21 1755    Specimen: Blood Updated: 10/12/21 1820     Troponin T <0.010 ng/mL     Narrative:      Troponin T Reference Range:  <= 0.03 ng/mL-   Negative for AMI  >0.03 ng/mL-     Abnormal for myocardial necrosis.  Clinicians would have to utilize clinical acumen, EKG, Troponin and serial changes to determine if it is an Acute Myocardial Infarction or myocardial injury due to an underlying chronic condition.       Results may be falsely decreased if patient taking Biotin.      D-dimer, Quantitative [[id number]]  (Abnormal) Collected: 10/12/21 1755    Specimen: Blood Updated: 10/12/21 1814     D-Dimer, Quantitative 1.35 mg/L (FEU)     Narrative:      Reference Range is 0-0.50 mg/L FEU. However, results <0.50 mg/L FEU tends to rule out DVT or PE. Results >0.50 mg/L FEU are not useful in predicting absence or presence of DVT or PE.      CBC & Differential [[id number]]  (Abnormal) Collected: 10/12/21 1755    Specimen: Blood Updated: 10/12/21 1805    Narrative:      The following orders were created for panel order CBC & Differential.  Procedure                               Abnormality         Status                     ---------                               -----------         ------                     CBC Auto Differential[[id number]]        Abnormal            Final result                 Please view results for these tests on the individual orders.    CBC Auto Differential [[id number]]  (Abnormal) Collected: 10/12/21 1755    Specimen: Blood Updated: 10/12/21 1805     WBC 6.47 10*3/mm3      RBC 5.40 10*6/mm3      Hemoglobin 15.0 g/dL      Hematocrit 44.5 %      MCV 82.4 fL      MCH 27.8 pg      MCHC 33.7 g/dL       RDW 13.9 %      RDW-SD 41.1 fl      MPV 10.3 fL      Platelets 188 10*3/mm3      Neutrophil % 63.2 %      Lymphocyte % 22.3 %      Monocyte % 13.3 %      Eosinophil % 0.0 %      Basophil % 0.3 %      Immature Grans % 0.9 %      Neutrophils, Absolute 4.09 10*3/mm3      Lymphocytes, Absolute 1.44 10*3/mm3      Monocytes, Absolute 0.86 10*3/mm3      Eosinophils, Absolute 0.00 10*3/mm3      Basophils, Absolute 0.02 10*3/mm3      Immature Grans, Absolute 0.06 10*3/mm3      nRBC 0.0 /100 WBC     Blood Gas, Arterial - [[id number]]  (Abnormal) Collected: 10/12/21 1755    Specimen: Arterial Blood Updated: 10/12/21 1752     Site Right Radial     James's Test Positive     pH, Arterial 7.498 pH units      Comment: 83 Value above reference range        pCO2, Arterial 30.1 mm Hg      Comment: 84 Value below reference range        pO2, Arterial 45.8 mm Hg      Comment: 85 Value below critical limit        HCO3, Arterial 23.3 mmol/L      Base Excess, Arterial 1.1 mmol/L      O2 Saturation, Arterial 86.7 %      Comment: 84 Value below reference range        Temperature 37.0 C      Barometric Pressure for Blood Gas 750 mmHg      Modality Nasal Cannula     Flow Rate 6.0 lpm      Ventilator Mode NA     Notified Who RISHABH GILBERT     Notified By [id number]     Notified Time 10/12/2021 18:00     Collected by [id number]     Comment: Meter: [id number]     :  [id number]        pCO2, Temperature Corrected 30.1 mm Hg      pH, Temp Corrected 7.498 pH Units      pO2, Temperature Corrected 45.8 mm Hg            Cultures:  No results found for: BLOODCX, URINECX, WOUNDCX, MRSACX, RESPCX, STOOLCX    Radiology Data:    Imaging Results (Last 24 Hours)     Procedure Component Value Units Date/Time    CT Angiogram Chest [[id number]] Collected: 10/12/21 2037     Updated: 10/12/21 2044    Narrative:      Exam: CT angiogram of the of the chest with intravenous contrast.     CLINICAL HISTORY:  Positive for Covid. Shortness of breath.     DOSE:  666  mGycm. Automated exposure control was utilized to diminish  patient radiation dose.     TECHNIQUE: Contiguous axial images were obtained through the thorax  following intravenous contrast administration with reformatted images  obtained in the sagittal and coronal projections from the original data  set. Three-dimensional reconstructions are also obtained. The study is  significantly degraded by breathing motion.     COMPARISON:  None available     FINDINGS:     Pulmonary arteries:  I do not see any gross evidence of pulmonary  thromboembolic disease but the study is degraded by breathing motion.  This results in obscuration of the more distal segmental and  subsegmental pulmonary artery branches.     Aorta:  The thoracic aorta and proximal great vessels are normal in  appearance. There is no evidence of aortic dissection or aneurysm.     LUNGS:  Multifocal rather extensive mixed groundglass and alveolar  consolidation is noted within both lungs. This is felt to represent  extensive Covid pneumonia.     Pleural spaces: Unremarkable. No evidence of pleural effusion or  pneumothorax.     HEART: No evidence of enlargement. No coronary artery calcifications are  present. There is no evidence of pericardial effusion. No evidence of RV  dysfunction.     Bones: No acute osseous abnormalities are demonstrated.     CHEST WALL: Left-sided gynecomastia is present. No additional chest wall  abnormalities are demonstrated.     Lymph nodes: No enlarged mediastinal or axillary lymphadenopathy is  present.     Upper abdomen: Limited images of the upper abdomen are unremarkable.       Impression:      1. No gross evidence of pulmonary thromboembolic disease. There is  respiratory motion on today's exam and obscuring some of the more distal  segmental and subsegmental pulmonary artery branches.  2. The thoracic aorta and proximal great vessels are unremarkable.  3. Rather extensive mixed groundglass and alveolar consolidation  within  both lungs consistent with rather severe Covid pneumonia.  This report was finalized on 10/12/2021 20:41 by Dr. José Miguel Hays MD.    XR Chest 1 View [[id number]] Collected: 10/12/21 1820     Updated: 10/12/21 1823    Narrative:      EXAMINATION:  XR CHEST 1 VW-  10/12/2021 6:15 PM CDT     HISTORY: Shortness of breath.     COMPARISON: No comparison study.     FINDINGS:  There are patchy groundglass parenchymal infiltrates  bilaterally. Heart size is upper limits of normal. The inspiration is  not very deep. No significant pleural effusion is seen. No acute bony  abnormality is seen.       Impression:      Bilateral groundglass parenchymal infiltrates worrisome for  Covid 19 infection.        This report was finalized on 10/12/2021 18:20 by Dr. Placido Eller MD.          No Known Allergies    Scheduled meds:   albuterol sulfate HFA, 2 puff, Inhalation, 4x Daily - RT  artificial tears, , Both Eyes, Q4H  ascorbic acid, 500 mg, Oral, Daily  atorvastatin, 10 mg, Oral, Nightly  cholecalciferol, 2,000 Units, Oral, Daily  dexamethasone, 6 mg, Oral, Daily With Breakfast  enoxaparin, 40 mg, Subcutaneous, Q12H  famotidine, 20 mg, Oral, BID AC  insulin lispro, 2-7 Units, Subcutaneous, 4x Daily With Meals & Nightly  Pharmacy to Dose Tocilizumab (COVID-19), , Does not apply, Once  sodium chloride, 10 mL, Intravenous, Q12H  zinc sulfate, 220 mg, Oral, Daily        PRN meds:  •  acetaminophen **OR** acetaminophen  •  artificial tears  •  benzonatate  •  dextrose  •  dextrose  •  glucagon (human recombinant)  •  ondansetron  •  sodium chloride    Assessment/Plan       Pneumonia due to COVID-19 virus    Acute respiratory failure with hypoxia (HCC)      Plan:  Covid pneumonia/acute respiratory failure hypoxia.  Plan for Tocilizumab today.  Continue Decadron.  Consult pulmonary.  Consult infectious disease.  Albuterol inhaler.  Tessalon Perles as needed.  Zinc.  Vitamin D.  Vitamin C.  Decadron.  Lovenox prophylaxis.   Lipitor.  Chest x-ray-Bilateral groundglass parenchymal infiltrates worrisome for Covid 19 infection.  CTA of the chest-No gross evidence of pulmonary thromboembolic disease, thoracic aorta and proximal great vessels are unremarkable, extensive mixed groundglass and alveolar consolidation within both lungs consistent with rather severe Covid pneumonia.  Echocardiogram pending.  On BiPAP maxing out.    Reflux.  Pepcid.  Zofran as needed.    Acute kidney injury.  Creatinine is improving.  Cut back IV hydration    Hyponatremia.  Normal saline.    Hyperglycemia.  Sliding scale.  Add Levemir.  Hemoglobin A1c.    Morbid obesity.  BMI is 44.    Nutrition.  Regular diet.    Blood culture pending.  Covid-19-positive    Discharge Planning:  3 to 6 days.    Electronically signed by Dustin Zuniga MD, 10/13/21, 10:35 AM CDT.

## 2021-10-13 NOTE — CONSULTS
PULMONARY AND CRITICAL CARE CONSULT - [facility]    Abebe Esquivel   MR# [id number]  Acct# [id number]  10/13/2021   08:46 CDT    Referring Provider: Dustin Zuniga MD    Chief Complaint: Acute respiratory failure due to SARS-CoV-2 infection    HPI: We are consulted by Dustin Zuniga MD to see this 40 y.o. male born on 1980.  Patient has presented with continuous worsening and severe shortness of breath in chest for a few days in context Covid-19, with respiratory deterioration requiring escalation of oxygen therapy.  He was diagnosed     Past Medical History  Obesity    No Known Allergies  Medications  albuterol sulfate HFA, 2 puff, Inhalation, 4x Daily - RT  ascorbic acid, 500 mg, Oral, Daily  atorvastatin, 10 mg, Oral, Nightly  cholecalciferol, 2,000 Units, Oral, Daily  dexamethasone, 6 mg, Oral, Daily With Breakfast  enoxaparin, 40 mg, Subcutaneous, Q12H  famotidine, 20 mg, Oral, BID AC  insulin lispro, 2-7 Units, Subcutaneous, 4x Daily With Meals & Nightly  remdesivir, 200 mg, Intravenous, Q24H   Followed by  [START ON 10/14/2021] remdesivir, 100 mg, Intravenous, Q24H  sodium chloride, 10 mL, Intravenous, Q12H  zinc sulfate, 220 mg, Oral, Daily      Pharmacy Consult - Remdesivir,   sodium chloride, 75 mL/hr, Last Rate: 75 mL/hr (10/13/21 0004)      Social History    nonsmoker.  Family History  Positive for hypertension    Review of Systems:  Review of Systems   Constitutional: Positive for fever.   HENT: Negative for nosebleeds and trouble swallowing.    Respiratory: Positive for cough and shortness of breath. Negative for choking.    Cardiovascular: Negative for chest pain and leg swelling.   Gastrointestinal: Positive for diarrhea. Negative for vomiting.   Genitourinary: Negative for hematuria.   Musculoskeletal: Negative for arthralgias and neck stiffness.   Skin: Negative for rash.   Neurological: Negative for tremors, syncope and weakness.   Psychiatric/Behavioral: Negative for  agitation.      Physical Exam:  Temp:  [97.8 °F (36.6 °C)-99.8 °F (37.7 °C)] 97.8 °F (36.6 °C)  Heart Rate:  [] 67  Resp:  [18-24] 24  BP: (106-151)/() 129/73    Intake/Output Summary (Last 24 hours) at 10/13/2021 0846  Last data filed at 10/13/2021 0600  Gross per 24 hour   Intake 549 ml   Output 825 ml   Net -276 ml         10/12/21  1734 10/13/21  0045   Weight: (!) 147 kg (325 lb) (!) 147 kg (324 lb 12.8 oz)     SpO2 Percentage    10/13/21 0615 10/13/21 0630 10/13/21 0645   SpO2: 91% 93% 91%     GENERAL/CONSTITUTIONAL: no distress. Sitting up in bed utilizing cell phone  HEENT: atraumatic, normocephalic  NOSE: normal; nasal cannulae in place  NECK: jugular veins nondistended  CHEST: no paradox, no retractions.  No respiratory distress.  CARDIAC: reg rhythm  ABDOMEN: nondistended, no guarding; obese  EXTREMITIES: no edema.  NEURO:  awake. No seizure activity. No obvious cranial nerve abnormality  PSYCH: no agitation. Speech not pressured  SKIN: no jaundice.  No rash      Results from last 7 days   Lab Units 10/13/21  0321 10/12/21  1755   WBC 10*3/mm3 6.00 6.47   HEMOGLOBIN g/dL 14.6 15.0   PLATELETS 10*3/mm3 178 188     Results from last 7 days   Lab Units 10/13/21  0321 10/12/21  1755   SODIUM mmol/L 130* 130*   POTASSIUM mmol/L 3.7 3.6   CO2 mmol/L 22.0 25.0   BUN mg/dL 22* 21*   CREATININE mg/dL 1.18 1.31*   MAGNESIUM mg/dL 2.3  --    PHOSPHORUS mg/dL 3.5  --    GLUCOSE mg/dL 282* 214*   PROCALCITONIN ng/mL  --  0.33*   FERRITIN ng/mL 1,243.00* 1,328.00*   D DIMER QUANT mg/L (FEU)  --  1.35*     Results from last 7 days   Lab Units 10/12/21  1755   PH, ARTERIAL pH units 7.498*   PCO2, ARTERIAL mm Hg 30.1*   PO2 ART mm Hg 45.8*     No results found for: BLOODCX, URINECX, WOUNDCX, MRSACX, RESPCX, STOOLCX  No results found for: PROBNP      Recent radiology:   Imaging Results (Last 72 Hours)     Procedure Component Value Units Date/Time    CT Angiogram Chest [[id number]] Collected: 10/12/21 2037      Updated: 10/12/21 2044    Narrative:      Exam: CT angiogram of the of the chest with intravenous contrast.     CLINICAL HISTORY:  Positive for Covid. Shortness of breath.     DOSE:  666 mGycm. Automated exposure control was utilized to diminish  patient radiation dose.     TECHNIQUE: Contiguous axial images were obtained through the thorax  following intravenous contrast administration with reformatted images  obtained in the sagittal and coronal projections from the original data  set. Three-dimensional reconstructions are also obtained. The study is  significantly degraded by breathing motion.     COMPARISON:  None available     FINDINGS:     Pulmonary arteries:  I do not see any gross evidence of pulmonary  thromboembolic disease but the study is degraded by breathing motion.  This results in obscuration of the more distal segmental and  subsegmental pulmonary artery branches.     Aorta:  The thoracic aorta and proximal great vessels are normal in  appearance. There is no evidence of aortic dissection or aneurysm.     LUNGS:  Multifocal rather extensive mixed groundglass and alveolar  consolidation is noted within both lungs. This is felt to represent  extensive Covid pneumonia.     Pleural spaces: Unremarkable. No evidence of pleural effusion or  pneumothorax.     HEART: No evidence of enlargement. No coronary artery calcifications are  present. There is no evidence of pericardial effusion. No evidence of RV  dysfunction.     Bones: No acute osseous abnormalities are demonstrated.     CHEST WALL: Left-sided gynecomastia is present. No additional chest wall  abnormalities are demonstrated.     Lymph nodes: No enlarged mediastinal or axillary lymphadenopathy is  present.     Upper abdomen: Limited images of the upper abdomen are unremarkable.       Impression:      1. No gross evidence of pulmonary thromboembolic disease. There is  respiratory motion on today's exam and obscuring some of the more  distal  segmental and subsegmental pulmonary artery branches.  2. The thoracic aorta and proximal great vessels are unremarkable.  3. Rather extensive mixed groundglass and alveolar consolidation within  both lungs consistent with rather severe Covid pneumonia.  This report was finalized on 10/12/2021 20:41 by Dr. José Miguel Hays MD.    XR Chest 1 View [[id number]] Collected: 10/12/21 1820     Updated: 10/12/21 1823    Narrative:      EXAMINATION:  XR CHEST 1 VW-  10/12/2021 6:15 PM CDT     HISTORY: Shortness of breath.     COMPARISON: No comparison study.     FINDINGS:  There are patchy groundglass parenchymal infiltrates  bilaterally. Heart size is upper limits of normal. The inspiration is  not very deep. No significant pleural effusion is seen. No acute bony  abnormality is seen.       Impression:      Bilateral groundglass parenchymal infiltrates worrisome for  Covid 19 infection.        This report was finalized on 10/12/2021 18:20 by Dr. Placido Eller MD.        My radiograph interpretation/independent review of imaging: covid infiltrates on cxr and also ct chest  Other test results (not lab or imaging):  none available  Independent review of ekg: sinus rhythm, no     Problem List as identified by Epic (may contain historical, inactive problems)  Patient Active Problem List   Diagnosis   • Acute respiratory failure with hypoxia (HCC)   • Pneumonia due to COVID-19 virus     Pulmonary Assessment:  Acute respiratory failure due to Covid-19.  Pt has failed pulmonary system.  This is a threat to life or pulmonary function, high risk, due to Covid-19 infection and failure of pulmonary system    New problem (to me), with additional workup planned: Acute respiratory failure with hypoxia  New problem (to me), no additional workup planned: Covid-19 with adjuvant therapies per others  Other problems either stable, failing to improve or worsenin. Obesity  2. Unvaccinated and did not receive  viet    Recommend/plan:   Continue oxygen supplementation for saturation goal of 90%.  Chest X-ray as indicated, with judicious use to minimize exposure risk to hospital personnel  Arterial blood gas analysis as indicated  Aggressive DVT prophylaxis; enoxaparin  Proning candidate: yes  Dexamethasone day 2 of 10  Additional plan:  · Case was discussed with ID  · Discussed use of tocilizumab in this setting with patient  · Will give fact sheet and rx if he is willing    Thank you for this consult.  We will follow along.  Electronically signed by Raza Gonzalez MD on 10/13/2021 at 08:46 CDT

## 2021-10-13 NOTE — CONSULTS
INFECTIOUS DISEASES CONSULT NOTE    Patient:  Abebe Esquivel 40 y.o. male  ROOM # I010/1  YOB: 1980  MRN: [id number]  [facility]:  [id number]  Admit date: 10/12/2021   Admitting Physician: Dustin Zuniga MD  Primary Care Physician: Maris Littlejohn DO  REFERRING PROVIDER: Harris Harden DO      REASON FOR CONSULTATION : COVID-19 infection      HISTORY OF PRESENT ILLNESS: The patient is a pleasant 40-year-old unvaccinated gentleman who is in the intensive care unit on Vapotherm at 40 L and 100% after presenting to the emergency department via EMS last night.  Patient said he started feeling poorly Elver October 3 with fevers and chills.  He also developed congestion, diarrhea and loss of taste and smell.    He went to [facility] on Monday, October 4 and was tested and informed within 45 minutes that his test was positive.    He does not recall any discussion about monoclonal antibodies.  He did not monitor his O2 sats at home.    The patient was on 6 L of oxygen in the emergency department saturating 84%.  He rather quickly transition to Vapotherm.    Remdesivir has been ordered but he is out of the window for any benefit from that that was discontinued.  Dr. Gonzalez has already talked to him about Tocilizumab.  MARINO Charles has printed the fact sheet for him to review.    He reports others have discussed proning with him as well.      Has talked to HD.  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  Mother in house and she was quarantined from him and is doing fine.        History reviewed. No pertinent past medical history.  History reviewed. No pertinent surgical history.  History reviewed. No pertinent family history.  Social History     Socioeconomic History   • Marital status: Single           Current Meds:     Current Facility-Administered Medications   Medication Dose Route Frequency Provider Last Rate Last Admin   • acetaminophen (TYLENOL) tablet 650 mg  650 mg Oral Q4H  PRN Harris Harden DO        Or   • acetaminophen (TYLENOL) suppository 650 mg  650 mg Rectal Q4H PRN Harris Harden DO       • albuterol sulfate HFA (PROVENTIL HFA;VENTOLIN HFA;PROAIR HFA) inhaler 2 puff  2 puff Inhalation 4x Daily - RT Harris Harden DO   2 puff at 10/13/21 0915   • artificial tears ophthalmic ointment   Both Eyes Q4H Raza Gonzalez MD       • artificial tears ophthalmic ointment   Both Eyes PRN Raza Gonzalez MD       • ascorbic acid (VITAMIN C) tablet 500 mg  500 mg Oral Daily Harris Harden DO   500 mg at 10/13/21 0849   • atorvastatin (LIPITOR) tablet 10 mg  10 mg Oral Nightly Harris Harden DO   10 mg at 10/13/21 0016   • benzonatate (TESSALON) capsule 200 mg  200 mg Oral TID PRN Harris Harden DO   200 mg at 10/13/21 0208   • cholecalciferol (VITAMIN D3) tablet 2,000 Units  2,000 Units Oral Daily Dustin Zuniga MD   2,000 Units at 10/13/21 0849   • dexamethasone (DECADRON) tablet 6 mg  6 mg Oral Daily With Breakfast Harris Harden DO   6 mg at 10/13/21 0849   • dextrose (D50W) (25 g/50 mL) IV injection 25 g  25 g Intravenous Q15 Min PRN Harris Harden DO       • dextrose (GLUTOSE) oral gel 15 g  15 g Oral Q15 Min PRN Harris Harden DO       • enoxaparin (LOVENOX) syringe 40 mg  40 mg Subcutaneous Q12H Harris Harden DO   40 mg at 10/13/21 0849   • famotidine (PEPCID) tablet 20 mg  20 mg Oral BID AC Harris Harden DO   20 mg at 10/13/21 0849   • glucagon (human recombinant) (GLUCAGEN DIAGNOSTIC) injection 1 mg  1 mg Subcutaneous Q15 Min PRN Harris Harden DO       • insulin lispro (humaLOG) injection 2-7 Units  2-7 Units Subcutaneous 4x Daily With Meals & Nightly Dereck, Ali Felipa, DO       • ondansetron (ZOFRAN) injection 4 mg  4 mg Intravenous Q6H PRN Harris Harden DO       • Pharmacy Consult - Remdesivir   Does not apply Continuous PRN Dustin Zuniga MD       • Pharmacy to Dose Tocilizumab (COVID-19)   Does  "not apply Once Raza Gonzalez MD       • remdesivir 200 mg in sodium chloride 0.9 % 290 mL IVPB (powder vial)  200 mg Intravenous Q24H Dustin Zuniga MD        Followed by   • [START ON 10/14/2021] remdesivir 100 mg in sodium chloride 0.9 % 270 mL IVPB (powder vial)  100 mg Intravenous Q24H Dustin Zuniga MD       • sodium chloride 0.9 % flush 10 mL  10 mL Intravenous Q12H Harris Harden DO   10 mL at 10/13/21 0850   • sodium chloride 0.9 % flush 10 mL  10 mL Intravenous PRN Harris Harden DO       • sodium chloride 0.9 % infusion  75 mL/hr Intravenous Continuous Harris Harden DO 75 mL/hr at 10/13/21 0004 75 mL/hr at 10/13/21 0004   • zinc sulfate (ZINCATE) capsule 220 mg  220 mg Oral Daily Harris Harden DO   220 mg at 10/13/21 0849       Home Meds:  Prior to Admission medications    Not on File          No Known Allergies    Review of Systems   Constitutional: Positive for fever.   HENT: Positive for congestion.    Eyes: Negative for photophobia.   Respiratory: Positive for shortness of breath.    Cardiovascular: Negative for palpitations.   Gastrointestinal: Positive for diarrhea.   Endocrine: Negative for polyuria.   Genitourinary: Negative for dysuria.   Musculoskeletal: Positive for arthralgias.   Allergic/Immunologic: Negative for immunocompromised state.   Neurological: Negative for seizures.         Vital Signs:  /73   Pulse 77   Temp 97.8 °F (36.6 °C) (Oral)   Resp 20   Ht 182.9 cm (72\")   Wt (!) 147 kg (324 lb 12.8 oz)   SpO2 92%   BMI 44.05 kg/m²   Temp (24hrs), Av.5 °F (36.9 °C), Min:97.8 °F (36.6 °C), Max:99.8 °F (37.7 °C)      Physical Exam    General: Patient is obese male sitting up in a recliner in no acute distress  HEENT: High flow nasal cannula in place.  Respiratory: Effort even and unlabored.  He was not conversationally dyspneic.  He is not noted to cough any.  Abdomen: Obese in appearance  Neuro: Alert and oriented, speech clear.  Able to give " history without difficulty  Psych: Pleasant cooperative      Results Review:    I reviewed the patient's new clinical results.    Lab Results:  CBC:   Lab Results   Lab 10/12/21  1755 10/13/21  0321   WBC 6.47 6.00   HEMOGLOBIN 15.0 14.6   HEMATOCRIT 44.5 43.2   PLATELETS 188 178       CMP:   Lab Results   Lab 10/12/21  1755 10/13/21  0321   SODIUM 130* 130*   POTASSIUM 3.6 3.7   CHLORIDE 94* 96*   CO2 25.0 22.0   BUN 21* 22*   CREATININE 1.31* 1.18   CALCIUM 8.5* 8.5*   BILIRUBIN 0.6 0.5   ALK PHOS 58 56   ALT (SGPT) 21 20   AST (SGOT) 37 35   GLUCOSE 214* 282*     COVID LABS:  Results From Last 14 Days   Lab Units 10/13/21  0321 10/12/21  1755   CRP mg/dL 9.37*  --    D DIMER QUANT mg/L (FEU)  --  1.35*   FERRITIN ng/mL 1,243.00* 1,328.00*   LACTATE mmol/L  --  1.7   LDH U/L  --  694*   PROCALCITONIN ng/mL  --  0.33*   TROPONIN T ng/mL <0.010 <0.010         Lab Results (last 72 hours)     Procedure Component Value Units Date/Time    C-reactive Protein [[id number]]  (Abnormal) Collected: 10/13/21 0321    Specimen: Blood Updated: 10/13/21 0910     C-Reactive Protein 9.37 mg/dL     Comprehensive Metabolic Panel [[id number]]  (Abnormal) Collected: 10/13/21 0321    Specimen: Blood Updated: 10/13/21 0422     Glucose 282 mg/dL      BUN 22 mg/dL      Creatinine 1.18 mg/dL      Sodium 130 mmol/L      Potassium 3.7 mmol/L      Chloride 96 mmol/L      CO2 22.0 mmol/L      Calcium 8.5 mg/dL      Total Protein 7.3 g/dL      Albumin 3.50 g/dL      ALT (SGPT) 20 U/L      AST (SGOT) 35 U/L      Alkaline Phosphatase 56 U/L      Total Bilirubin 0.5 mg/dL      eGFR Non African Amer 68 mL/min/1.73      Globulin 3.8 gm/dL      A/G Ratio 0.9 g/dL      BUN/Creatinine Ratio 18.6     Anion Gap 12.0 mmol/L     Narrative:      GFR Normal >60  Chronic Kidney Disease <60  Kidney Failure <15      Troponin [[id number]]  (Normal) Collected: 10/13/21 0321    Specimen: Blood Updated: 10/13/21 0422     Troponin T <0.010 ng/mL     Narrative:       Troponin T Reference Range:  <= 0.03 ng/mL-   Negative for AMI  >0.03 ng/mL-     Abnormal for myocardial necrosis.  Clinicians would have to utilize clinical acumen, EKG, Troponin and serial changes to determine if it is an Acute Myocardial Infarction or myocardial injury due to an underlying chronic condition.       Results may be falsely decreased if patient taking Biotin.      Phosphorus [[id number]]  (Normal) Collected: 10/13/21 0321    Specimen: Blood Updated: 10/13/21 0422     Phosphorus 3.5 mg/dL     Magnesium [[id number]]  (Normal) Collected: 10/13/21 0321    Specimen: Blood Updated: 10/13/21 0422     Magnesium 2.3 mg/dL     Ferritin [[id number]]  (Abnormal) Collected: 10/13/21 0321    Specimen: Blood Updated: 10/13/21 0422     Ferritin 1,243.00 ng/mL     Narrative:      Results may be falsely decreased if patient taking Biotin.      CBC Auto Differential [[id number]]  (Abnormal) Collected: 10/13/21 0321    Specimen: Blood Updated: 10/13/21 0345     WBC 6.00 10*3/mm3      RBC 5.19 10*6/mm3      Hemoglobin 14.6 g/dL      Hematocrit 43.2 %      MCV 83.2 fL      MCH 28.1 pg      MCHC 33.8 g/dL      RDW 13.8 %      RDW-SD 41.6 fl      MPV 10.2 fL      Platelets 178 10*3/mm3      Neutrophil % 68.6 %      Lymphocyte % 18.2 %      Monocyte % 11.7 %      Eosinophil % 0.0 %      Basophil % 0.2 %      Immature Grans % 1.3 %      Neutrophils, Absolute 4.12 10*3/mm3      Lymphocytes, Absolute 1.09 10*3/mm3      Monocytes, Absolute 0.70 10*3/mm3      Eosinophils, Absolute 0.00 10*3/mm3      Basophils, Absolute 0.01 10*3/mm3      Immature Grans, Absolute 0.08 10*3/mm3      nRBC 0.0 /100 WBC     Blood Culture - Blood, Arm, Left [[id number]] Collected: 10/12/21 1830    Specimen: Blood from Arm, Left Updated: 10/12/21 1856    Blood Culture - Blood, Arm, Left [[id number]] Collected: 10/12/21 1755    Specimen: Blood from Arm, Left Updated: 10/12/21 1856    COVID-19,[facility] IN-HOUSE,Nasal Swab No Transport Media 3-4 HR TAT - Swab,  "Nasal Cavity [[id number]]  (Abnormal) Collected: 10/12/21 1757    Specimen: Swab from Nasal Cavity Updated: 10/12/21 1855     COVID19 Detected    Narrative:      Fact sheet for providers: https://www.fda.gov/media/364406/download     Fact sheet for patients: https://www.fda.gov/media/666196/download    Test performed by PCR.    Consider negative results in combination with clinical observations, patient history, and epidemiological information.    Procalcitonin [[id number]]  (Abnormal) Collected: 10/12/21 1755    Specimen: Blood Updated: 10/12/21 1830     Procalcitonin 0.33 ng/mL     Narrative:      As a Marker for Sepsis (Non-Neonates):     1. <0.5 ng/mL represents a low risk of severe sepsis and/or septic shock.  2. >2 ng/mL represents a high risk of severe sepsis and/or septic shock.    As a Marker for Lower Respiratory Tract Infections that require antibiotic therapy:  PCT on Admission     Antibiotic Therapy             6-12 Hrs later  >0.5                          Strongly Recommended            >0.25 - <0.5             Recommended  0.1 - 0.25                  Discouraged                       Remeasure/reassess PCT  <0.1                         Strongly Discouraged         Remeasure/reassess PCT      As 28 day mortality risk marker: \"Change in Procalcitonin Result\" (>80% or <=80%) if Day 0 (or Day 1) and Day 4 values are available. Refer to http://www.[employer]-pct-calculator.com/    Change in PCT <=80 %   A decrease of PCT levels below or equal to 80% defines a positive change in PCT test result representing a higher risk for 28-day all-cause mortality of patients diagnosed with severe sepsis or septic shock.    Change in PCT >80 %   A decrease of PCT levels of more than 80% defines a negative change in PCT result representing a lower risk for 28-day all-cause mortality of patients diagnosed with severe sepsis or septic shock.                Ferritin [[id number]]  (Abnormal) Collected: 10/12/21 1755    Specimen: " Blood Updated: 10/12/21 1830     Ferritin 1,328.00 ng/mL     Narrative:      Results may be falsely decreased if patient taking Biotin.      Comprehensive Metabolic Panel [[id number]]  (Abnormal) Collected: 10/12/21 1755    Specimen: Blood Updated: 10/12/21 1825     Glucose 214 mg/dL      BUN 21 mg/dL      Creatinine 1.31 mg/dL      Sodium 130 mmol/L      Potassium 3.6 mmol/L      Chloride 94 mmol/L      CO2 25.0 mmol/L      Calcium 8.5 mg/dL      Total Protein 7.6 g/dL      Albumin 3.50 g/dL      ALT (SGPT) 21 U/L      AST (SGOT) 37 U/L      Alkaline Phosphatase 58 U/L      Total Bilirubin 0.6 mg/dL      eGFR Non African Amer 61 mL/min/1.73      Globulin 4.1 gm/dL      A/G Ratio 0.9 g/dL      BUN/Creatinine Ratio 16.0     Anion Gap 11.0 mmol/L     Narrative:      GFR Normal >60  Chronic Kidney Disease <60  Kidney Failure <15      Lactate Dehydrogenase [[id number]]  (Abnormal) Collected: 10/12/21 1755    Specimen: Blood Updated: 10/12/21 1824      U/L     Lactic Acid, Plasma [[id number]]  (Normal) Collected: 10/12/21 1755    Specimen: Blood Updated: 10/12/21 1821     Lactate 1.7 mmol/L     Troponin [[id number]]  (Normal) Collected: 10/12/21 1755    Specimen: Blood Updated: 10/12/21 1820     Troponin T <0.010 ng/mL     Narrative:      Troponin T Reference Range:  <= 0.03 ng/mL-   Negative for AMI  >0.03 ng/mL-     Abnormal for myocardial necrosis.  Clinicians would have to utilize clinical acumen, EKG, Troponin and serial changes to determine if it is an Acute Myocardial Infarction or myocardial injury due to an underlying chronic condition.       Results may be falsely decreased if patient taking Biotin.      D-dimer, Quantitative [[id number]]  (Abnormal) Collected: 10/12/21 1755    Specimen: Blood Updated: 10/12/21 1814     D-Dimer, Quantitative 1.35 mg/L (FEU)     Narrative:      Reference Range is 0-0.50 mg/L FEU. However, results <0.50 mg/L FEU tends to rule out DVT or PE. Results >0.50 mg/L FEU are not useful  in predicting absence or presence of DVT or PE.      CBC & Differential [[id number]]  (Abnormal) Collected: 10/12/21 1755    Specimen: Blood Updated: 10/12/21 1805    Narrative:      The following orders were created for panel order CBC & Differential.  Procedure                               Abnormality         Status                     ---------                               -----------         ------                     CBC Auto Differential[[id number]]        Abnormal            Final result                 Please view results for these tests on the individual orders.    CBC Auto Differential [[id number]]  (Abnormal) Collected: 10/12/21 1755    Specimen: Blood Updated: 10/12/21 1805     WBC 6.47 10*3/mm3      RBC 5.40 10*6/mm3      Hemoglobin 15.0 g/dL      Hematocrit 44.5 %      MCV 82.4 fL      MCH 27.8 pg      MCHC 33.7 g/dL      RDW 13.9 %      RDW-SD 41.1 fl      MPV 10.3 fL      Platelets 188 10*3/mm3      Neutrophil % 63.2 %      Lymphocyte % 22.3 %      Monocyte % 13.3 %      Eosinophil % 0.0 %      Basophil % 0.3 %      Immature Grans % 0.9 %      Neutrophils, Absolute 4.09 10*3/mm3      Lymphocytes, Absolute 1.44 10*3/mm3      Monocytes, Absolute 0.86 10*3/mm3      Eosinophils, Absolute 0.00 10*3/mm3      Basophils, Absolute 0.02 10*3/mm3      Immature Grans, Absolute 0.06 10*3/mm3      nRBC 0.0 /100 WBC     Blood Gas, Arterial - [[id number]]  (Abnormal) Collected: 10/12/21 1755    Specimen: Arterial Blood Updated: 10/12/21 1752     Site Right Radial     James's Test Positive     pH, Arterial 7.498 pH units      Comment: 83 Value above reference range        pCO2, Arterial 30.1 mm Hg      Comment: 84 Value below reference range        pO2, Arterial 45.8 mm Hg      Comment: 85 Value below critical limit        HCO3, Arterial 23.3 mmol/L      Base Excess, Arterial 1.1 mmol/L      O2 Saturation, Arterial 86.7 %      Comment: 84 Value below reference range        Temperature 37.0 C      Barometric Pressure  for Blood Gas 750 mmHg      Modality Nasal Cannula     Flow Rate 6.0 lpm      Ventilator Mode NA     Notified Who RISHABH SOLISP     Notified By [id number]     Notified Time 10/12/2021 18:00     Collected by [id number]     Comment: Meter: [id number]     :  [id number]        pCO2, Temperature Corrected 30.1 mm Hg      pH, Temp Corrected 7.498 pH Units      pO2, Temperature Corrected 45.8 mm Hg           Estimated Creatinine Clearance: 123.6 mL/min (by C-G formula based on SCr of 1.18 mg/dL).    Culture Results:    Microbiology Results (last 10 days)     Procedure Component Value - Date/Time    COVID-19,[facility] IN-HOUSE,Nasal Swab No Transport Media 3-4 HR TAT - Swab, Nasal Cavity [[id number]]  (Abnormal) Collected: 10/12/21 1757    Lab Status: Final result Specimen: Swab from Nasal Cavity Updated: 10/12/21 1855     COVID19 Detected    Narrative:      Fact sheet for providers: https://www.fda.gov/media/992436/download     Fact sheet for patients: https://www.fda.gov/media/240626/download    Test performed by PCR.    Consider negative results in combination with clinical observations, patient history, and epidemiological information.             Radiology:   Imaging Results (Last 72 Hours)     Procedure Component Value Units Date/Time    CT Angiogram Chest [[id number]] Collected: 10/12/21 2037     Updated: 10/12/21 2044    Narrative:      Exam: CT angiogram of the of the chest with intravenous contrast.     CLINICAL HISTORY:  Positive for Covid. Shortness of breath.     DOSE:  666 mGycm. Automated exposure control was utilized to diminish  patient radiation dose.     TECHNIQUE: Contiguous axial images were obtained through the thorax  following intravenous contrast administration with reformatted images  obtained in the sagittal and coronal projections from the original data  set. Three-dimensional reconstructions are also obtained. The study is  significantly degraded by breathing motion.     COMPARISON:  None  available     FINDINGS:     Pulmonary arteries:  I do not see any gross evidence of pulmonary  thromboembolic disease but the study is degraded by breathing motion.  This results in obscuration of the more distal segmental and  subsegmental pulmonary artery branches.     Aorta:  The thoracic aorta and proximal great vessels are normal in  appearance. There is no evidence of aortic dissection or aneurysm.     LUNGS:  Multifocal rather extensive mixed groundglass and alveolar  consolidation is noted within both lungs. This is felt to represent  extensive Covid pneumonia.     Pleural spaces: Unremarkable. No evidence of pleural effusion or  pneumothorax.     HEART: No evidence of enlargement. No coronary artery calcifications are  present. There is no evidence of pericardial effusion. No evidence of RV  dysfunction.     Bones: No acute osseous abnormalities are demonstrated.     CHEST WALL: Left-sided gynecomastia is present. No additional chest wall  abnormalities are demonstrated.     Lymph nodes: No enlarged mediastinal or axillary lymphadenopathy is  present.     Upper abdomen: Limited images of the upper abdomen are unremarkable.       Impression:      1. No gross evidence of pulmonary thromboembolic disease. There is  respiratory motion on today's exam and obscuring some of the more distal  segmental and subsegmental pulmonary artery branches.  2. The thoracic aorta and proximal great vessels are unremarkable.  3. Rather extensive mixed groundglass and alveolar consolidation within  both lungs consistent with rather severe Covid pneumonia.  This report was finalized on 10/12/2021 20:41 by Dr. José Miguel Hays MD.    XR Chest 1 View [[id number]] Collected: 10/12/21 1820     Updated: 10/12/21 1823    Narrative:      EXAMINATION:  XR CHEST 1 VW-  10/12/2021 6:15 PM CDT     HISTORY: Shortness of breath.     COMPARISON: No comparison study.     FINDINGS:  There are patchy groundglass parenchymal  infiltrates  bilaterally. Heart size is upper limits of normal. The inspiration is  not very deep. No significant pleural effusion is seen. No acute bony  abnormality is seen.       Impression:      Bilateral groundglass parenchymal infiltrates worrisome for  Covid 19 infection.        This report was finalized on 10/12/2021 18:20 by Dr. Placido Eller MD.            Assessment/Plan       Pneumonia due to COVID-19 virus    Acute respiratory failure with hypoxia (HCC)      IMPRESSION:  COVID-19 infection with pneumonia-approximately day #11 from symptom onset and day #10 from positive test.  Unfortunately the patient was not monitoring O2 sats at home and was not offered monoclonal antibody therapy per his report.    Acute respiratory failure with severe hypoxia requiring maximum Vapotherm support.  Patient is not in any distress however.    Morbid obesity    Elevated CRP secondary to COVID-19 infection    Elevated ferritin secondary to COVID-19 infection    Acute kidney injury-resolved    Hyperglycemia and patient not diagnosed with diabetes in the past      RECOMMENDATION:   · Continue dexamethasone  · Continue enhanced droplet precaution  · O2 support to maintain adequate saturations  · Proning reviewed  With patient.    · VTE prophylaxis per attending  · Patient to review the FDA EUA fact sheet for tocilizumab.  · Discontinue remdesivir-patient over the window for benefit based on his history of testing as an outpatient and his onset of symptoms.  · Adjuvant therapy per attending.  · Add hemoglobin A1c to blood in lab    Discussed with MARINO Charles  Discussed with Dr. Lisa Dick MD  10/13/21  10:00 CDT

## 2021-10-13 NOTE — PLAN OF CARE
ECHO completed today. Patient has been up to the chair for majority of the day. Blood sugars elevated in the 300s - SSI coverage. Little appetite due to no taste. Working on incentive spirometer and flutter valve independently. Afebrile. Remains on 40L/100% vapotherm - maintaining O2 saturation low 90s. Desat to 87-88% with exertion. Explained importance of proning and patient states he is agreeable. Tocilizumab given today - not a candidate for remdesivir. NSR 70s-80s.   Problem: Adult Inpatient Plan of Care  Goal: Plan of Care Review  10/13/2021 1843 by Araceli Ragsdale RN  Outcome: Ongoing, Progressing  10/13/2021 0737 by Araceli Ragsdale RN  Outcome: Ongoing, Progressing  Goal: Absence of Hospital-Acquired Illness or Injury  Outcome: Ongoing, Progressing  Intervention: Identify and Manage Fall Risk  Recent Flowsheet Documentation  Taken 10/13/2021 1800 by Araceli Ragsdale RN  Safety Promotion/Fall Prevention:   fall prevention program maintained   safety round/check completed  Taken 10/13/2021 1700 by Araceli Ragsdale RN  Safety Promotion/Fall Prevention:   fall prevention program maintained   safety round/check completed  Taken 10/13/2021 1600 by Araceli Ragsdale RN  Safety Promotion/Fall Prevention:   fall prevention program maintained   safety round/check completed  Taken 10/13/2021 1500 by Araceli Ragsdale RN  Safety Promotion/Fall Prevention:   fall prevention program maintained   safety round/check completed  Taken 10/13/2021 1400 by Araceli Ragsdale RN  Safety Promotion/Fall Prevention:   fall prevention program maintained   safety round/check completed  Taken 10/13/2021 1300 by Araceli Ragsdale, RN  Safety Promotion/Fall Prevention:   fall prevention program maintained   safety round/check completed  Taken 10/13/2021 1200 by Araceli Ragsdale RN  Safety Promotion/Fall Prevention:   fall prevention program maintained   safety round/check completed  Taken 10/13/2021 1100 by Araceli Ragsdale, RN  Safety Promotion/Fall Prevention:   fall  prevention program maintained   safety round/check completed  Taken 10/13/2021 1015 by Araceli Ragsdale RN  Safety Promotion/Fall Prevention:   fall prevention program maintained   safety round/check completed  Taken 10/13/2021 0915 by Araceli Ragsdale RN  Safety Promotion/Fall Prevention:   fall prevention program maintained   safety round/check completed  Taken 10/13/2021 0845 by Araceli Ragsdale RN  Safety Promotion/Fall Prevention:   fall prevention program maintained   safety round/check completed  Intervention: Prevent Skin Injury  Recent Flowsheet Documentation  Taken 10/13/2021 1800 by Araceli Ragsdale RN  Body Position: position changed independently  Taken 10/13/2021 1600 by Araceli Ragsdale RN  Body Position: position changed independently  Skin Protection:   adhesive use limited   incontinence pads utilized   skin-to-device areas padded   tubing/devices free from skin contact  Taken 10/13/2021 1400 by Araceli Ragsdale RN  Body Position: position maintained  Taken 10/13/2021 1200 by Araceli Ragsdale RN  Skin Protection:   adhesive use limited   incontinence pads utilized   skin-to-device areas padded   tubing/devices free from skin contact  Taken 10/13/2021 0845 by Araceli Ragsdale RN  Skin Protection:   adhesive use limited   incontinence pads utilized   skin-to-device areas padded   tubing/devices free from skin contact  Intervention: Prevent and Manage VTE (venous thromboembolism) Risk  Recent Flowsheet Documentation  Taken 10/13/2021 1600 by Araceli Ragsdale RN  VTE Prevention/Management: (lovenox) other (see comments)  Taken 10/13/2021 1200 by Araceli Ragsdale RN  VTE Prevention/Management: (lovenox) other (see comments)  Taken 10/13/2021 0845 by Araceli Ragsdale RN  VTE Prevention/Management: (lovenox) other (see comments)  Goal: Optimal Comfort and Wellbeing  Outcome: Ongoing, Progressing  Intervention: Provide Person-Centered Care  Recent Flowsheet Documentation  Taken 10/13/2021 1600 by Araceli Ragsdale RN  Trust  Relationship/Rapport:   care explained   choices provided   thoughts/feelings acknowledged   reassurance provided  Taken 10/13/2021 1200 by Araceli Ragsdale RN  Trust Relationship/Rapport:   care explained   choices provided  Taken 10/13/2021 0845 by Araceli Ragsdale RN  Trust Relationship/Rapport:   choices provided   care explained   reassurance provided   thoughts/feelings acknowledged  Goal: Readiness for Transition of Care  Outcome: Ongoing, Progressing     Problem: Skin Injury Risk Increased  Goal: Skin Health and Integrity  10/13/2021 1843 by Araceli Ragsdale RN  Outcome: Ongoing, Progressing  10/13/2021 0740 by Araceli Ragsdale RN  Outcome: Ongoing, Progressing  Intervention: Optimize Skin Protection  Recent Flowsheet Documentation  Taken 10/13/2021 1600 by Araceli Ragsdale RN  Pressure Reduction Techniques:   positioned off wounds   heels elevated off bed   pressure points protected   weight shift assistance provided  Head of Bed (HOB): HOB at 30 degrees  Pressure Reduction Devices:   positioning supports utilized   pressure-redistributing mattress utilized   heel offloading device utilized  Skin Protection:   adhesive use limited   incontinence pads utilized   skin-to-device areas padded   tubing/devices free from skin contact  Taken 10/13/2021 1200 by Araceli Ragsdale RN  Pressure Reduction Techniques:   positioned off wounds   pressure points protected   heels elevated off bed   weight shift assistance provided  Pressure Reduction Devices:   positioning supports utilized   pressure-redistributing mattress utilized   heel offloading device utilized  Skin Protection:   adhesive use limited   incontinence pads utilized   skin-to-device areas padded   tubing/devices free from skin contact  Taken 10/13/2021 0845 by Araceli Ragsdale RN  Pressure Reduction Techniques:   heels elevated off bed   positioned off wounds   pressure points protected   weight shift assistance provided  Pressure Reduction Devices:   heel offloading device  utilized   positioning supports utilized   pressure-redistributing mattress utilized  Skin Protection:   adhesive use limited   incontinence pads utilized   skin-to-device areas padded   tubing/devices free from skin contact     Problem: Fall Injury Risk  Goal: Absence of Fall and Fall-Related Injury  10/13/2021 1843 by Araceli Ragsdale RN  Outcome: Ongoing, Progressing  10/13/2021 0740 by Araceli Ragsdale RN  Outcome: Ongoing, Progressing  Intervention: Promote Injury-Free Environment  Recent Flowsheet Documentation  Taken 10/13/2021 1800 by Araceli Ragsdale RN  Safety Promotion/Fall Prevention:   fall prevention program maintained   safety round/check completed  Taken 10/13/2021 1700 by Araceli Ragsdale RN  Safety Promotion/Fall Prevention:   fall prevention program maintained   safety round/check completed  Taken 10/13/2021 1600 by Araceli Ragsdale RN  Safety Promotion/Fall Prevention:   fall prevention program maintained   safety round/check completed  Taken 10/13/2021 1500 by Araceli Ragsdale RN  Safety Promotion/Fall Prevention:   fall prevention program maintained   safety round/check completed  Taken 10/13/2021 1400 by Araceli Ragsdale RN  Safety Promotion/Fall Prevention:   fall prevention program maintained   safety round/check completed  Taken 10/13/2021 1300 by Araceli Ragsdale RN  Safety Promotion/Fall Prevention:   fall prevention program maintained   safety round/check completed  Taken 10/13/2021 1200 by Araceli Ragdsale RN  Safety Promotion/Fall Prevention:   fall prevention program maintained   safety round/check completed  Taken 10/13/2021 1100 by Araceli Ragsdale RN  Safety Promotion/Fall Prevention:   fall prevention program maintained   safety round/check completed  Taken 10/13/2021 1015 by Araceli Ragsdale RN  Safety Promotion/Fall Prevention:   fall prevention program maintained   safety round/check completed  Taken 10/13/2021 0915 by Araceli Ragsdale RN  Safety Promotion/Fall Prevention:   fall prevention program maintained    safety round/check completed  Taken 10/13/2021 0845 by Araceli Ragsdale, RN  Safety Promotion/Fall Prevention:   fall prevention program maintained   safety round/check completed   Goal Outcome Evaluation:

## 2021-10-13 NOTE — H&P
"    [facility] Medicine Services  HISTORY AND PHYSICAL    Date of Admission: 10/12/2021  Primary Care Physician: Maris Littlejohn DO    Subjective     Chief Complaint: Dyspnea    History of Present Illness  40-year-old male who was admitted to the hospital for shortness of breath.  The patient tested positive for Covid 8 days ago, and symptoms started 9 days ago.  The patient complains of shortness of breath.  Fevers and chills.  He also has cough.  He is experiencing mild diarrhea.  He has had lost of smell and taste.  He has had decreased appetite.  Patient is not vaccinated.  He did not receive Regeneron infusion.  Patient was found to be hypoxic at 79 percent.  He was placed on 5 L of oxygen O2 improved to 84 percent.  On my examination, he was seen on high flow with an O2 to demand of 100 percent, and saturations between 91 and 92%.        Review of Systems   Fever  Dyspnea  Cough  Diarrhea    Otherwise complete ROS reviewed and negative except as mentioned in the HPI.    Past Medical History: No past medical history on file.     Past Surgical History:No past surgical history on file.     Social History:  No tobacco no alcohol    Family History: Hypertension    Allergies:  No Known Allergies    Medications:  Prior to Admission medications    Not on File     I have utilized all available immediate resources to obtain, update, and review the patient's current medications.    Objective     Vital Signs: /79   Pulse 83   Temp 99.8 °F (37.7 °C) (Oral)   Resp 18   Ht 182.9 cm (72\")   Wt (!) 147 kg (325 lb)   SpO2 94%   BMI 44.08 kg/m²   Physical Exam  Vitals reviewed.   Constitutional:       Appearance: He is obese. He is ill-appearing.   HENT:      Head: Normocephalic and atraumatic.      Right Ear: External ear normal.      Left Ear: External ear normal.      Nose: Nose normal.   Eyes:      General: No scleral icterus.     Conjunctiva/sclera: Conjunctivae normal. "   Cardiovascular:      Rate and Rhythm: Normal rate and regular rhythm.      Heart sounds: Normal heart sounds.   Pulmonary:      Effort: Respiratory distress present.      Breath sounds: Rhonchi present.   Abdominal:      General: There is no distension.      Palpations: Abdomen is soft.      Comments: Central obesity   Musculoskeletal:         General: No swelling or tenderness.      Cervical back: Normal range of motion and neck supple.   Skin:     General: Skin is warm and dry.   Neurological:      General: No focal deficit present.      Mental Status: He is alert.      Cranial Nerves: No cranial nerve deficit.   Psychiatric:         Mood and Affect: Mood normal.         Behavior: Behavior normal.       Results Reviewed:  Lab Results (last 24 hours)     Procedure Component Value Units Date/Time    Blood Culture - Blood, Arm, Left [[id number]] Collected: 10/12/21 1830    Specimen: Blood from Arm, Left Updated: 10/12/21 1856    Blood Culture - Blood, Arm, Left [[id number]] Collected: 10/12/21 1755    Specimen: Blood from Arm, Left Updated: 10/12/21 1856    COVID-19,[facility] IN-HOUSE,Nasal Swab No Transport Media 3-4 HR TAT - Swab, Nasal Cavity [[id number]]  (Abnormal) Collected: 10/12/21 1757    Specimen: Swab from Nasal Cavity Updated: 10/12/21 1855     COVID19 Detected    Narrative:      Fact sheet for providers: https://www.fda.gov/media/526577/download     Fact sheet for patients: https://www.fda.gov/media/749094/download    Test performed by PCR.    Consider negative results in combination with clinical observations, patient history, and epidemiological information.    Procalcitonin [[id number]]  (Abnormal) Collected: 10/12/21 1755    Specimen: Blood Updated: 10/12/21 1830     Procalcitonin 0.33 ng/mL     Narrative:      As a Marker for Sepsis (Non-Neonates):     1. <0.5 ng/mL represents a low risk of severe sepsis and/or septic shock.  2. >2 ng/mL represents a high risk of severe sepsis and/or septic  "shock.    As a Marker for Lower Respiratory Tract Infections that require antibiotic therapy:  PCT on Admission     Antibiotic Therapy             6-12 Hrs later  >0.5                          Strongly Recommended            >0.25 - <0.5             Recommended  0.1 - 0.25                  Discouraged                       Remeasure/reassess PCT  <0.1                         Strongly Discouraged         Remeasure/reassess PCT      As 28 day mortality risk marker: \"Change in Procalcitonin Result\" (>80% or <=80%) if Day 0 (or Day 1) and Day 4 values are available. Refer to http://www.[facility]/    Change in PCT <=80 %   A decrease of PCT levels below or equal to 80% defines a positive change in PCT test result representing a higher risk for 28-day all-cause mortality of patients diagnosed with severe sepsis or septic shock.    Change in PCT >80 %   A decrease of PCT levels of more than 80% defines a negative change in PCT result representing a lower risk for 28-day all-cause mortality of patients diagnosed with severe sepsis or septic shock.                Ferritin [[id number]]  (Abnormal) Collected: 10/12/21 1755    Specimen: Blood Updated: 10/12/21 1830     Ferritin 1,328.00 ng/mL     Narrative:      Results may be falsely decreased if patient taking Biotin.      Comprehensive Metabolic Panel [[id number]]  (Abnormal) Collected: 10/12/21 1755    Specimen: Blood Updated: 10/12/21 1825     Glucose 214 mg/dL      BUN 21 mg/dL      Creatinine 1.31 mg/dL      Sodium 130 mmol/L      Potassium 3.6 mmol/L      Chloride 94 mmol/L      CO2 25.0 mmol/L      Calcium 8.5 mg/dL      Total Protein 7.6 g/dL      Albumin 3.50 g/dL      ALT (SGPT) 21 U/L      AST (SGOT) 37 U/L      Alkaline Phosphatase 58 U/L      Total Bilirubin 0.6 mg/dL      eGFR Non African Amer 61 mL/min/1.73      Globulin 4.1 gm/dL      A/G Ratio 0.9 g/dL      BUN/Creatinine Ratio 16.0     Anion Gap 11.0 mmol/L     Narrative:      GFR Normal " >60  Chronic Kidney Disease <60  Kidney Failure <15      Lactate Dehydrogenase [[id number]]  (Abnormal) Collected: 10/12/21 1755    Specimen: Blood Updated: 10/12/21 1824      U/L     Lactic Acid, Plasma [[id number]]  (Normal) Collected: 10/12/21 1755    Specimen: Blood Updated: 10/12/21 1821     Lactate 1.7 mmol/L     Troponin [[id number]]  (Normal) Collected: 10/12/21 1755    Specimen: Blood Updated: 10/12/21 1820     Troponin T <0.010 ng/mL     Narrative:      Troponin T Reference Range:  <= 0.03 ng/mL-   Negative for AMI  >0.03 ng/mL-     Abnormal for myocardial necrosis.  Clinicians would have to utilize clinical acumen, EKG, Troponin and serial changes to determine if it is an Acute Myocardial Infarction or myocardial injury due to an underlying chronic condition.       Results may be falsely decreased if patient taking Biotin.      D-dimer, Quantitative [[id number]]  (Abnormal) Collected: 10/12/21 1755    Specimen: Blood Updated: 10/12/21 1814     D-Dimer, Quantitative 1.35 mg/L (FEU)     Narrative:      Reference Range is 0-0.50 mg/L FEU. However, results <0.50 mg/L FEU tends to rule out DVT or PE. Results >0.50 mg/L FEU are not useful in predicting absence or presence of DVT or PE.      CBC & Differential [[id number]]  (Abnormal) Collected: 10/12/21 1755    Specimen: Blood Updated: 10/12/21 1805    Narrative:      The following orders were created for panel order CBC & Differential.  Procedure                               Abnormality         Status                     ---------                               -----------         ------                     CBC Auto Differential[[id number]]        Abnormal            Final result                 Please view results for these tests on the individual orders.    CBC Auto Differential [[id number]]  (Abnormal) Collected: 10/12/21 1755    Specimen: Blood Updated: 10/12/21 1805     WBC 6.47 10*3/mm3      RBC 5.40 10*6/mm3      Hemoglobin 15.0 g/dL      Hematocrit  44.5 %      MCV 82.4 fL      MCH 27.8 pg      MCHC 33.7 g/dL      RDW 13.9 %      RDW-SD 41.1 fl      MPV 10.3 fL      Platelets 188 10*3/mm3      Neutrophil % 63.2 %      Lymphocyte % 22.3 %      Monocyte % 13.3 %      Eosinophil % 0.0 %      Basophil % 0.3 %      Immature Grans % 0.9 %      Neutrophils, Absolute 4.09 10*3/mm3      Lymphocytes, Absolute 1.44 10*3/mm3      Monocytes, Absolute 0.86 10*3/mm3      Eosinophils, Absolute 0.00 10*3/mm3      Basophils, Absolute 0.02 10*3/mm3      Immature Grans, Absolute 0.06 10*3/mm3      nRBC 0.0 /100 WBC     Blood Gas, Arterial - [[id number]]  (Abnormal) Collected: 10/12/21 1755    Specimen: Arterial Blood Updated: 10/12/21 1752     Site Right Radial     James's Test Positive     pH, Arterial 7.498 pH units      Comment: 83 Value above reference range        pCO2, Arterial 30.1 mm Hg      Comment: 84 Value below reference range        pO2, Arterial 45.8 mm Hg      Comment: 85 Value below critical limit        HCO3, Arterial 23.3 mmol/L      Base Excess, Arterial 1.1 mmol/L      O2 Saturation, Arterial 86.7 %      Comment: 84 Value below reference range        Temperature 37.0 C      Barometric Pressure for Blood Gas 750 mmHg      Modality Nasal Cannula     Flow Rate 6.0 lpm      Ventilator Mode NA     Notified [facility] RISHABH GARDNER VLADIMIR     Notified By [id number]     Notified Time 10/12/2021 18:00     Collected by [id number]     Comment: Meter: [id number]     :  [id number]        pCO2, Temperature Corrected 30.1 mm Hg      pH, Temp Corrected 7.498 pH Units      pO2, Temperature Corrected 45.8 mm Hg         Imaging Results (Last 24 Hours)     Procedure Component Value Units Date/Time    CT Angiogram Chest [[id number]] Collected: 10/12/21 2037     Updated: 10/12/21 2044    Narrative:      Exam: CT angiogram of the of the chest with intravenous contrast.     CLINICAL HISTORY:  Positive for Covid. Shortness of breath.     DOSE:  666 mGycm. Automated exposure control was  utilized to diminish  patient radiation dose.     TECHNIQUE: Contiguous axial images were obtained through the thorax  following intravenous contrast administration with reformatted images  obtained in the sagittal and coronal projections from the original data  set. Three-dimensional reconstructions are also obtained. The study is  significantly degraded by breathing motion.     COMPARISON:  None available     FINDINGS:     Pulmonary arteries:  I do not see any gross evidence of pulmonary  thromboembolic disease but the study is degraded by breathing motion.  This results in obscuration of the more distal segmental and  subsegmental pulmonary artery branches.     Aorta:  The thoracic aorta and proximal great vessels are normal in  appearance. There is no evidence of aortic dissection or aneurysm.     LUNGS:  Multifocal rather extensive mixed groundglass and alveolar  consolidation is noted within both lungs. This is felt to represent  extensive Covid pneumonia.     Pleural spaces: Unremarkable. No evidence of pleural effusion or  pneumothorax.     HEART: No evidence of enlargement. No coronary artery calcifications are  present. There is no evidence of pericardial effusion. No evidence of RV  dysfunction.     Bones: No acute osseous abnormalities are demonstrated.     CHEST WALL: Left-sided gynecomastia is present. No additional chest wall  abnormalities are demonstrated.     Lymph nodes: No enlarged mediastinal or axillary lymphadenopathy is  present.     Upper abdomen: Limited images of the upper abdomen are unremarkable.       Impression:      1. No gross evidence of pulmonary thromboembolic disease. There is  respiratory motion on today's exam and obscuring some of the more distal  segmental and subsegmental pulmonary artery branches.  2. The thoracic aorta and proximal great vessels are unremarkable.  3. Rather extensive mixed groundglass and alveolar consolidation within  both lungs consistent with rather  severe Covid pneumonia.  This report was finalized on 10/12/2021 20:41 by Dr. José Miguel Hays MD.    XR Chest 1 View [[id number]] Collected: 10/12/21 1820     Updated: 10/12/21 1823    Narrative:      EXAMINATION:  XR CHEST 1 VW-  10/12/2021 6:15 PM CDT     HISTORY: Shortness of breath.     COMPARISON: No comparison study.     FINDINGS:  There are patchy groundglass parenchymal infiltrates  bilaterally. Heart size is upper limits of normal. The inspiration is  not very deep. No significant pleural effusion is seen. No acute bony  abnormality is seen.       Impression:      Bilateral groundglass parenchymal infiltrates worrisome for  Covid 19 infection.        This report was finalized on 10/12/2021 18:20 by Dr. Placido Eller MD.        I have personally reviewed and interpreted the radiology studies and ECG obtained at time of admission.     Assessment / Plan     Assessment:   Active Hospital Problems    Diagnosis    • Acute respiratory failure with hypoxia (HCC)      Impression:  1.  COVID-19 pneumonia  2.  Acute respiratory failure with hypoxia  3.  KATYA  4.  Hyponatremia  5.  Hyperglycemia  6.  Morbid obesity    Plan:   1.  O2 support  2.  Pulmonology and ID consult, anticipate possibly Remdesivir or Tocilizumab infusion  3.  Albuterol MDI  4.  Tessalon as needed  5.  Zinc, vitamin D and vitamin C   6.  Decadron, Pepcid, Lipitor, and Lovenox  7.  Labs in the morning  8.  Sliding scale insulin with Accu-Cheks  9.  Gentle IV fluid hydration for 12 hours      Code Status/Advanced Care Plan: Full    The patient's surrogate decision maker is family.     I discussed my findings and recommendations with the patient.    Estimated length of stay is extended.     The patient was seen and examined by me on 10/12/2021 at 11.    Electronically signed by Harris Harden DO, 10/12/21, 23:29 CDT.

## 2021-10-13 NOTE — CASE MANAGEMENT/SOCIAL WORK
Discharge Planning Assessment  [facility]     Patient Name: Abebe Esquivel  MRN: [id number]  Today's Date: 10/13/2021    Admit Date: 10/12/2021     Discharge Needs Assessment     Row Name 10/13/21 1059       Living Environment    Lives With parent(s)    Name(s) of Who Lives With Patient mother--Aicha    Current Living Arrangements home/apartment/condo    Primary Care Provided by parent(s)    Provides Primary Care For no one    Family Caregiver if Needed parent(s)    Family Caregiver Names mother    Quality of Family Relationships helpful; involved; supportive    Able to Return to Prior Arrangements yes       Resource/Environmental Concerns    Resource/Environmental Concerns none    Transportation Concerns car, none       Transition Planning    Patient/Family Anticipates Transition to home with family    Patient/Family Anticipated Services at Transition none       Discharge Needs Assessment    Readmission Within the Last 30 Days no previous admission in last 30 days    Equipment Currently Used at Home none    Concerns to be Addressed no discharge needs identified    Anticipated Changes Related to Illness none    Equipment Needed After Discharge none    Discharge Coordination/Progress spoke to mother, Aicha; patient lives with mother; mother says he does have insurance and a PCP; independent at home will follow for DC needs               Discharge Plan    No documentation.               Continued Care and Services - Admitted Since 10/12/2021    Coordination has not been started for this encounter.          Demographic Summary    No documentation.                Functional Status    No documentation.                Psychosocial    No documentation.                Abuse/Neglect    No documentation.                Legal    No documentation.                Substance Abuse    No documentation.                Patient Forms    No documentation.                   iLsa Paniagua RN

## 2021-10-14 NOTE — PROGRESS NOTES
[facility]  INPATIENT PROGRESS NOTE    Length of Stay: 2  Date of Admission: 10/12/2021  Primary Care Physician: Maris Littlejohn DO    Subjective   Chief Complaint: Covid pneumonia/respiratory failure/morbid obesity.    HPI   Currently on 80% BiPAP.  Patient no acute distress.  T-max 99.1.  T-current 98.  Blood pressure stable.    Review of Systems   Constitutional: Positive for activity change, appetite change and fatigue. Negative for chills and fever.   HENT: Negative for hearing loss, nosebleeds, tinnitus and trouble swallowing.    Eyes: Negative for visual disturbance.   Respiratory: Positive for cough and shortness of breath. Negative for chest tightness and wheezing.    Cardiovascular: Negative for chest pain, palpitations and leg swelling.   Gastrointestinal: Negative for abdominal distention, abdominal pain, blood in stool, constipation, diarrhea, nausea and vomiting.   Endocrine: Negative for cold intolerance, heat intolerance, polydipsia, polyphagia and polyuria.   Genitourinary: Negative for decreased urine volume, difficulty urinating, dysuria, flank pain, frequency and hematuria.   Musculoskeletal: Negative for arthralgias, joint swelling and myalgias.   Skin: Negative for rash.   Allergic/Immunologic: Negative for immunocompromised state.   Neurological: Negative for dizziness, syncope, weakness, light-headedness and headaches.   Hematological: Negative for adenopathy. Does not bruise/bleed easily.   Psychiatric/Behavioral: Negative for confusion and sleep disturbance. The patient is not nervous/anxious.      All pertinent negatives and positives are as above. All other systems have been reviewed and are negative unless otherwise stated.     Objective    Temp:  [97.3 °F (36.3 °C)-99.1 °F (37.3 °C)] 98 °F (36.7 °C)  Heart Rate:  [61-86] 72  Resp:  [17-33] 25  BP: (111-140)/(69-91) 129/76    Intake/Output Summary (Last 24 hours) at  10/14/2021 1103  Last data filed at 10/14/2021 1000  Gross per 24 hour   Intake 2195.4 ml   Output 2250 ml   Net -54.6 ml     Physical Exam  Vitals and nursing note reviewed.   Constitutional:       Appearance: He is well-developed.   HENT:      Head: Normocephalic.   Eyes:      Conjunctiva/sclera: Conjunctivae normal.      Pupils: Pupils are equal, round, and reactive to light.   Neck:      Vascular: No JVD.   Cardiovascular:      Rate and Rhythm: Normal rate and regular rhythm.      Heart sounds: Normal heart sounds. No murmur heard.  No friction rub. No gallop.    Pulmonary:      Effort: No respiratory distress.      Breath sounds: No wheezing or rales.      Comments: On Vapotherm.  Decrease in breath sound bilateral, clear.  Chest:      Chest wall: No tenderness.   Abdominal:      General: Bowel sounds are normal. There is no distension.      Palpations: Abdomen is soft.      Tenderness: There is no abdominal tenderness. There is no guarding or rebound.      Comments: Morbid obesity.  BMI is 44.   Musculoskeletal:         General: No tenderness or deformity. Normal range of motion.      Cervical back: Neck supple.   Skin:     General: Skin is warm and dry.      Capillary Refill: Capillary refill takes 2 to 3 seconds.      Findings: No rash.   Neurological:      Mental Status: He is alert and oriented to person, place, and time.      Cranial Nerves: No cranial nerve deficit.      Motor: Weakness present. No abnormal muscle tone.      Coordination: Coordination abnormal.      Gait: Gait abnormal.      Deep Tendon Reflexes: Reflexes normal.   Psychiatric:         Behavior: Behavior normal.         Thought Content: Thought content normal.   Results Review:  Lab Results (last 24 hours)     Procedure Component Value Units Date/Time    POC Glucose Once [[id number]]  (Abnormal) Collected: 10/14/21 0813    Specimen: Blood Updated: 10/14/21 0834     Glucose 304 mg/dL      Comment: : [id number] Mitraese Melinda ID:  [id number]       POC Glucose Once [[id number]]  (Abnormal) Collected: 10/14/21 0609    Specimen: Blood Updated: 10/14/21 0630     Glucose 297 mg/dL      Comment: : [id number] Donovan De La Torre ID: [id number]       Comprehensive Metabolic Panel [[id number]]  (Abnormal) Collected: 10/14/21 0307    Specimen: Blood Updated: 10/14/21 0356     Glucose 319 mg/dL      BUN 26 mg/dL      Creatinine 1.15 mg/dL      Sodium 129 mmol/L      Potassium 4.0 mmol/L      Comment: Slight hemolysis detected by analyzer. Results may be affected.        Chloride 96 mmol/L      CO2 23.0 mmol/L      Calcium 8.9 mg/dL      Total Protein 7.4 g/dL      Albumin 3.40 g/dL      ALT (SGPT) 22 U/L      AST (SGOT) 32 U/L      Comment: Slight hemolysis detected by analyzer. Results may be affected.        Alkaline Phosphatase 60 U/L      Total Bilirubin 0.5 mg/dL      eGFR Non African Amer 70 mL/min/1.73      Globulin 4.0 gm/dL      A/G Ratio 0.9 g/dL      BUN/Creatinine Ratio 22.6     Anion Gap 10.0 mmol/L     Narrative:      GFR Normal >60  Chronic Kidney Disease <60  Kidney Failure <15      TSH [[id number]]  (Normal) Collected: 10/14/21 0307    Specimen: Blood Updated: 10/14/21 0350     TSH 2.140 uIU/mL     Procalcitonin [[id number]]  (Abnormal) Collected: 10/14/21 0307    Specimen: Blood Updated: 10/14/21 0349     Procalcitonin 0.26 ng/mL     Narrative:      As a Marker for Sepsis (Non-Neonates):     1. <0.5 ng/mL represents a low risk of severe sepsis and/or septic shock.  2. >2 ng/mL represents a high risk of severe sepsis and/or septic shock.    As a Marker for Lower Respiratory Tract Infections that require antibiotic therapy:  PCT on Admission     Antibiotic Therapy             6-12 Hrs later  >0.5                          Strongly Recommended            >0.25 - <0.5             Recommended  0.1 - 0.25                  Discouraged                       Remeasure/reassess PCT  <0.1                         Strongly Discouraged          "Remeasure/reassess PCT      As 28 day mortality risk marker: \"Change in Procalcitonin Result\" (>80% or <=80%) if Day 0 (or Day 1) and Day 4 values are available. Refer to [email or web address] [email or web address]    Change in PCT <=80 %   A decrease of PCT levels below or equal to 80% defines a positive change in PCT test result representing a higher risk for 28-day all-cause mortality of patients diagnosed with severe sepsis or septic shock.    Change in PCT >80 %   A decrease of PCT levels of more than 80% defines a negative change in PCT result representing a lower risk for 28-day all-cause mortality of patients diagnosed with severe sepsis or septic shock.                Ferritin [[id number]]  (Abnormal) Collected: 10/14/21 0307    Specimen: Blood Updated: 10/14/21 0348     Ferritin 1,502.00 ng/mL     Narrative:      Results may be falsely decreased if patient taking Biotin.      Lipid Panel [[id number]]  (Abnormal) Collected: 10/14/21 0307    Specimen: Blood Updated: 10/14/21 0347     Total Cholesterol 124 mg/dL      Triglycerides 270 mg/dL      HDL Cholesterol 15 mg/dL      LDL Cholesterol  65 mg/dL      VLDL Cholesterol 44 mg/dL      LDL/HDL Ratio 3.67    Narrative:      Cholesterol Reference Ranges  (U.S. Department of Health and Human Services ATP III Classifications)    Desirable          <200 mg/dL  Borderline High    200-239 mg/dL  High Risk          >240 mg/dL      Triglyceride Reference Ranges  (U.S. Department of Health and Human Services ATP III Classifications)    Normal           <150 mg/dL  Borderline High  150-199 mg/dL  High             200-499 mg/dL  Very High        >500 mg/dL    HDL Reference Ranges  (U.S. Department of Health and Human Services ATP III Classifcations)    Low     <40 mg/dl (major risk factor for CHD)  High    >60 mg/dl ('negative' risk factor for CHD)        LDL Reference Ranges  (U.S. Department of Health and Human Services ATP III Classifcations)    Optimal          <100 " mg/dL  Near Optimal     100-129 mg/dL  Borderline High  130-159 mg/dL  High             160-189 mg/dL  Very High        >189 mg/dL    C-reactive Protein [[id number]]  (Abnormal) Collected: 10/14/21 0307    Specimen: Blood Updated: 10/14/21 0347     C-Reactive Protein 5.40 mg/dL     CBC & Differential [[id number]]  (Abnormal) Collected: 10/14/21 0307    Specimen: Blood Updated: 10/14/21 0323    Narrative:      The following orders were created for panel order CBC & Differential.  Procedure                               Abnormality         Status                     ---------                               -----------         ------                     CBC Auto Differential[[id number]]        Abnormal            Final result                 Please view results for these tests on the individual orders.    CBC Auto Differential [[id number]]  (Abnormal) Collected: 10/14/21 0307    Specimen: Blood Updated: 10/14/21 0323     WBC 8.45 10*3/mm3      RBC 5.43 10*6/mm3      Hemoglobin 15.4 g/dL      Hematocrit 44.9 %      MCV 82.7 fL      MCH 28.4 pg      MCHC 34.3 g/dL      RDW 13.7 %      RDW-SD 40.9 fl      MPV 10.6 fL      Platelets 254 10*3/mm3      Neutrophil % 79.4 %      Lymphocyte % 11.1 %      Monocyte % 7.9 %      Eosinophil % 0.0 %      Basophil % 0.1 %      Immature Grans % 1.5 %      Neutrophils, Absolute 6.70 10*3/mm3      Lymphocytes, Absolute 0.94 10*3/mm3      Monocytes, Absolute 0.67 10*3/mm3      Eosinophils, Absolute 0.00 10*3/mm3      Basophils, Absolute 0.01 10*3/mm3      Immature Grans, Absolute 0.13 10*3/mm3      nRBC 0.0 /100 WBC     POC Glucose Once [[id number]]  (Abnormal) Collected: 10/14/21 0024    Specimen: Blood Updated: 10/14/21 0107     Glucose 319 mg/dL      Comment: : [id number] Donovan Ennisgarland ID: [id number]       POC Glucose Once [[id number]]  (Abnormal) Collected: 10/13/21 2224    Specimen: Blood Updated: 10/13/21 2235     Glucose 372 mg/dL      Comment: : [id number] Man  RayshensMeter ID: [id number]       POC Glucose Once [[id number]]  (Abnormal) Collected: 10/13/21 2038    Specimen: Blood Updated: 10/13/21 2049     Glucose 393 mg/dL      Comment: : [id number] Donovan EnnissMeter ID: [id number]       POC Glucose Once [[id number]]  (Abnormal) Collected: 10/13/21 2036    Specimen: Blood Updated: 10/13/21 2049     Glucose 408 mg/dL      Comment: : [id number] Donovan LolisMeter ID: [id number]       Blood Culture - Blood, Arm, Left [[id number]]  (Normal) Collected: 10/12/21 1830    Specimen: Blood from Arm, Left Updated: 10/13/21 1900     Blood Culture No growth at 24 hours    Blood Culture - Blood, Arm, Left [[id number]]  (Normal) Collected: 10/12/21 1755    Specimen: Blood from Arm, Left Updated: 10/13/21 1900     Blood Culture No growth at 24 hours    POC Glucose Once [[id number]]  (Abnormal) Collected: 10/13/21 1716    Specimen: Blood Updated: 10/13/21 1727     Glucose 374 mg/dL      Comment: : [id number] Jn AnnaMeter ID: [id number]       Hemoglobin A1c [[id number]]  (Abnormal) Collected: 10/13/21 0321    Specimen: Blood Updated: 10/13/21 1253     Hemoglobin A1C 8.40 %     Narrative:      Hemoglobin A1C Ranges:    Increased Risk for Diabetes  5.7% to 6.4%  Diabetes                     >= 6.5%  Diabetic Goal                < 7.0%    POC Glucose Once [[id number]]  (Abnormal) Collected: 10/13/21 1219    Specimen: Blood Updated: 10/13/21 1230     Glucose 350 mg/dL      Comment: : [id number] Jn AnnaMeter ID: [id number]              Cultures:  Blood Culture   Date Value Ref Range Status   10/12/2021 No growth at 24 hours  Preliminary   10/12/2021 No growth at 24 hours  Preliminary       Radiology Data:    Imaging Results (Last 24 Hours)     ** No results found for the last 24 hours. **          No Known Allergies    Scheduled meds:   albuterol sulfate HFA, 2 puff, Inhalation, 4x Daily - RT  artificial tears, , Both Eyes, Q4H  ascorbic acid, 500 mg, Oral,  Daily  atorvastatin, 10 mg, Oral, Nightly  cholecalciferol, 2,000 Units, Oral, Daily  dexamethasone, 6 mg, Oral, Daily With Breakfast  enoxaparin, 40 mg, Subcutaneous, Q12H  famotidine, 20 mg, Oral, BID AC  guaiFENesin, 1,200 mg, Oral, BID  insulin detemir, 5 Units, Subcutaneous, Q12H  insulin lispro, 2-7 Units, Subcutaneous, 4x Daily With Meals & Nightly  Pharmacy to Dose Tocilizumab (COVID-19), , Does not apply, Once  sodium chloride, 10 mL, Intravenous, Q12H  zinc sulfate, 220 mg, Oral, Daily        PRN meds:  •  acetaminophen **OR** acetaminophen  •  artificial tears  •  benzonatate  •  dextrose  •  dextrose  •  glucagon (human recombinant)  •  ondansetron  •  sodium chloride    Assessment/Plan       Pneumonia due to COVID-19 virus    Acute respiratory failure with hypoxia (HCC)      Plan:  Covid pneumonia/acute respiratory failure hypoxia.  Plan for Tocilizumab today.  Continue Decadron.  Consult pulmonary.  Consult infectious disease.  Albuterol inhaler.  Tessalon Perles as needed.  Zinc.  Vitamin D.  Vitamin C.  Decadron.  Lovenox prophylaxis.  Lipitor. Status post Tocilizumab 10/13/2021. Delsym as needed. Mucinex.  Chest x-ray-Bilateral groundglass parenchymal infiltrates worrisome for Covid 19 infection.  CTA of the chest-No gross evidence of pulmonary thromboembolic disease, thoracic aorta and proximal great vessels are unremarkable, extensive mixed groundglass and alveolar consolidation within both lungs consistent with rather severe Covid pneumonia.  Echocardiogram-ejection fraction 61 to 65%.  On BiPAP, 80% oxygen    Reflux.  Pepcid.  Zofran as needed.     Acute kidney injury.  Creatinine is improving.  Cut back IV hydration     Hyponatremia.  Slight decrease in sodium. Normal saline, slow IV normal saline.     Diabetes/hyperglycemia.    Moderate to high sliding scale.   Increase Levemir.  Hemoglobin A1c 8.4.  Add Tradjenta.     Morbid obesity.  BMI is 44.     Nutrition.  Regular/consistent carb  diet.     Blood culture pending.  Covid-19-positive     Discharge Planning:  3 to 6 days. Guarded.    Electronically signed by Dustin Zuniga MD, 10/14/21, 11:03 AM CDT.

## 2021-10-14 NOTE — PROGRESS NOTES
PULMONARY AND CRITICAL CARE PROGRESS NOTE - [facility]    Patient: Abebe Esquivel  1980   MR# [id number]   Acct# [id number]  10/14/21   08:15 CDT  Referring Provider: Dustin Zuniga MD    Chief Complaint: Covid-19     Interval history: Patient remains in isolation for Covid.  BiPAP initiated overnight.  He remains on BiPAP 16/8 FiO2 0.8 with a saturation 94.  Respiratory rate 29.  He is right side-lying.  When he was repositioned to his back his saturation dropped to mid 80s and respiratory rate to 40.  He recovered fairly quickly.  He is afebrile.  No other issues to report.    Meds:  albuterol sulfate HFA, 2 puff, Inhalation, 4x Daily - RT  artificial tears, , Both Eyes, Q4H  ascorbic acid, 500 mg, Oral, Daily  atorvastatin, 10 mg, Oral, Nightly  cholecalciferol, 2,000 Units, Oral, Daily  dexamethasone, 6 mg, Oral, Daily With Breakfast  enoxaparin, 40 mg, Subcutaneous, Q12H  famotidine, 20 mg, Oral, BID AC  insulin detemir, 5 Units, Subcutaneous, Q12H  insulin lispro, 2-7 Units, Subcutaneous, 4x Daily With Meals & Nightly  Pharmacy to Dose Tocilizumab (COVID-19), , Does not apply, Once  sodium chloride, 10 mL, Intravenous, Q12H  zinc sulfate, 220 mg, Oral, Daily         Review of Systems:   Review of Systems:    Constitutional: Positive for fever.   HENT: Negative for nosebleeds and trouble swallowing.    Respiratory: Positive for cough and shortness of breath. Negative for choking.    Cardiovascular: Negative for chest pain and leg swelling.   Gastrointestinal: Positive for diarrhea. Negative for vomiting.   Genitourinary: Negative for hematuria.   Musculoskeletal: Negative for arthralgias and neck stiffness.   Skin: Negative for rash.   Neurological: Negative for tremors, syncope and weakness.   Psychiatric/Behavioral: Negative for agitation.     Physical Exam:  SpO2 Percentage    10/14/21 0600 10/14/21 0700 10/14/21 0706   SpO2: 95% 98% 97%     Temp:  [97.3 °F (36.3 °C)-99.1 °F (37.3  °C)] 97.3 °F (36.3 °C)  Heart Rate:  [67-86] 72  Resp:  [17-33] 17  BP: (111-154)/(69-95) 122/72    Intake/Output Summary (Last 24 hours) at 10/14/2021 0815  Last data filed at 10/14/2021 0400  Gross per 24 hour   Intake 2355.4 ml   Output 2400 ml   Net -44.6 ml     GENERAL/CONSTITUTIONAL: mild distress.   HEENT: atraumatic, normocephalic  NOSE: BiPAP  NECK: jugular veins nondistended, thick  CHEST: no paradox, no retractions.  No respiratory distress.   CARDIAC: Sinus rhythm, 69  ABDOMEN: non-distended, obese  : Defer  EXTREMITIES: Not visible  NEURO: Awake, alert and oriented  SKIN: no jaundice.  No rash    Laboratory Data:  Results from last 7 days   Lab Units 10/14/21  0307 10/13/21  0321 10/12/21  1755   WBC 10*3/mm3 8.45 6.00 6.47   HEMOGLOBIN g/dL 15.4 14.6 15.0   PLATELETS 10*3/mm3 254 178 188     Results from last 7 days   Lab Units 10/14/21  0307 10/13/21  0321 10/12/21  1755 10/12/21  1755   SODIUM mmol/L 129* 130*  --  130*   POTASSIUM mmol/L 4.0 3.7  --  3.6   BUN mg/dL 26* 22*  --  21*   CREATININE mg/dL 1.15 1.18  --  1.31*   CRP mg/dL 5.40* 9.37*  --   --    PROCALCITONIN ng/mL 0.26*  --   --  0.33*   FERRITIN ng/mL 1,502.00* 1,243.00*   < > 1,328.00*   D DIMER QUANT mg/L (FEU)  --   --   --  1.35*    < > = values in this interval not displayed.     Results from last 7 days   Lab Units 10/12/21  1755   PH, ARTERIAL pH units 7.498*   PCO2, ARTERIAL mm Hg 30.1*   PO2 ART mm Hg 45.8*     Blood Culture   Date Value Ref Range Status   10/12/2021 No growth at 24 hours  Preliminary   10/12/2021 No growth at 24 hours  Preliminary     Recent films:  XR Chest 1 View    Result Date: 10/12/2021  Bilateral groundglass parenchymal infiltrates worrisome for Covid 19 infection.   This report was finalized on 10/12/2021 18:20 by Dr. Placido Eller MD.    CT Angiogram Chest    Result Date: 10/12/2021  1. No gross evidence of pulmonary thromboembolic disease. There is respiratory motion on today's exam and obscuring  some of the more distal segmental and subsegmental pulmonary artery branches. 2. The thoracic aorta and proximal great vessels are unremarkable. 3. Rather extensive mixed groundglass and alveolar consolidation within both lungs consistent with rather severe Covid pneumonia. This report was finalized on 10/12/2021 20:41 by Dr. José Miguel Hays MD.    Films reviewed personally by me.  My interpretation: None today, 10-14-21    Pulmonary Assessment:    1. Covid-19  2. Acute hypoxemic respiratory failure secondary to Covid  3. Obesity  4. Unvaccinated status, did not receive Regeneron    Recommend:     · Supplemental oxygen and titrate as tolerated for goal around 90% sat  · Currently on BiPAP 16/8 FiO2 0.8  · DVT prophylaxis, Lovenox 40 mg twice daily  · Mobilize as tolerated  · Proning candidate: Self proning if able  · Dexamethasone day 3 of 10  · Encourage nutrition and mobilization  · Pepcid while on dexamethasone  · Continue bronchodilators, albuterol  · Add Mucinex  · Patient received Tocilizumab  · Prognosis guarded    Electronically signed by CESIA Salmeron, 10/14/21, 08:15 CDT     Physician substantive portion:  Patient remains an isolated room for Covid.  He has started BiPAP overnight.  He is still on BiPAP now.  He is awake and alert and is in no distress.  Respirations are unlabored.  No accessory muscle use.  No stridor.  He is on BiPAP.  He gives a thumbs up regarding how well he is doing.  Continues on dexamethasone.  Recommend proning.  Continue BiPAP as needed, especially with sleep.    I have seen and examined patient personally, performing a face-to-face diagnostic evaluation with plan of care reviewed and developed with APRN and nursing staff. I have addended and/or modified the above history of present illness, physical examination, and assessment and plan to reflect my findings and impressions. Essential elements of the care plan were discussed with APRN above.  Agree with findings and  assessment/plan as documented above.    Electronically signed by Raza Gonzalez MD, on 10/14/2021, 09:00 CDT

## 2021-10-14 NOTE — PROGRESS NOTES
INFECTIOUS DISEASES PROGRESS NOTE    Patient:  Abebe Esquivel  YOB: 1980  MRN: [id number]   Admit date: 10/12/2021   Admitting Physician: Dustin Zuniga MD  Primary Care Physician: Maris Littlejohn DO    Chief Complaint: COVID-19 infection        Interval History: Reviewed with MARINO Jackson. Patient had some oxygenation issues and was transitioned to BiPAP.    He did reportedly self proned some.    Allergies: No Known Allergies    Current Meds:     Current Facility-Administered Medications:   •  acetaminophen (TYLENOL) tablet 650 mg, 650 mg, Oral, Q4H PRN **OR** acetaminophen (TYLENOL) suppository 650 mg, 650 mg, Rectal, Q4H PRN, Harris Harden DO  •  albuterol sulfate HFA (PROVENTIL HFA;VENTOLIN HFA;PROAIR HFA) inhaler 2 puff, 2 puff, Inhalation, 4x Daily - RT, Harris Harden DO, 2 puff at 10/14/21 0706  •  artificial tears ophthalmic ointment, , Both Eyes, Q4H, Raza Gonzalez MD  •  artificial tears ophthalmic ointment, , Both Eyes, PRN, Raza Gonzalez MD  •  ascorbic acid (VITAMIN C) tablet 500 mg, 500 mg, Oral, Daily, Harris Harden DO, 500 mg at 10/13/21 0849  •  atorvastatin (LIPITOR) tablet 10 mg, 10 mg, Oral, Nightly, Harris Harden DO, 10 mg at 10/13/21 2041  •  benzonatate (TESSALON) capsule 200 mg, 200 mg, Oral, TID PRN, Harris Harden DO, 200 mg at 10/13/21 0208  •  cholecalciferol (VITAMIN D3) tablet 2,000 Units, 2,000 Units, Oral, Daily, Dustin Zuniga MD, 2,000 Units at 10/13/21 0849  •  dexamethasone (DECADRON) tablet 6 mg, 6 mg, Oral, Daily With Breakfast, Harris Harden DO, 6 mg at 10/13/21 0849  •  dextrose (D50W) (25 g/50 mL) IV injection 25 g, 25 g, Intravenous, Q15 Min PRN, Harris Harden DO  •  dextrose (GLUTOSE) oral gel 15 g, 15 g, Oral, Q15 Min PRN, Harris Harden DO  •  enoxaparin (LOVENOX) syringe 40 mg, 40 mg, Subcutaneous, Q12H, Harris Harden DO, 40 mg at 10/13/21 2041  •  famotidine (PEPCID) tablet 20 mg,  "20 mg, Oral, BID AC, Harris Harden DO, 20 mg at 10/13/21 1717  •  glucagon (human recombinant) (GLUCAGEN DIAGNOSTIC) injection 1 mg, 1 mg, Subcutaneous, Q15 Min PRN, Harris Harden DO  •  insulin detemir (LEVEMIR) injection 5 Units, 5 Units, Subcutaneous, Q12H, Dustin Zuniga MD, 5 Units at 10/13/21 2039  •  insulin lispro (humaLOG) injection 2-7 Units, 2-7 Units, Subcutaneous, 4x Daily With Meals & Nightly, Harris Harden DO, 6 Units at 10/13/21 2040  •  ondansetron (ZOFRAN) injection 4 mg, 4 mg, Intravenous, Q6H PRN, Harris Harden DO  •  Pharmacy to Dose Tocilizumab (COVID-19), , Does not apply, Once, Raza Gonzalez MD  •  sodium chloride 0.9 % flush 10 mL, 10 mL, Intravenous, Q12H, Harris Harden DO, 10 mL at 10/13/21 2041  •  sodium chloride 0.9 % flush 10 mL, 10 mL, Intravenous, PRN, Harris Harden DO  •  zinc sulfate (ZINCATE) capsule 220 mg, 220 mg, Oral, Daily, Harris Harden DO, 220 mg at 10/13/21 0849      Review of Systems   Not obtained directly from patient    Objective     Vital Signs:  Temp (24hrs), Av.2 °F (36.8 °C), Min:97.3 °F (36.3 °C), Max:99.1 °F (37.3 °C)      /72   Pulse 72   Temp 97.3 °F (36.3 °C) (Axillary)   Resp 17   Ht 182.9 cm (72.01\")   Wt (!) 156 kg (343 lb 6.4 oz)   SpO2 97%   BMI 46.56 kg/m²         Physical Exam  Patient evaluated earlier through the glass door.  He was lying on his right side with BiPAP in place.  Came back this afternoon patient was sitting up in chair with BiPAP in place as well.  Reviewed with MARINO Jackson.  Patient was on Vapotherm for period of time but would be in the upper 80s and then desaturate lower therefore he was transitioned back to BiPAP.    Results Review:    I reviewed the patient's new clinical results.    Lab Results:  CBC:   Lab Results   Lab 10/12/21  1755 10/13/21  0321 10/14/21  0307   WBC 6.47 6.00 8.45   HEMOGLOBIN 15.0 14.6 15.4   HEMATOCRIT 44.5 43.2 44.9   PLATELETS 188 178 254 "         CMP:   Lab Results   Lab 10/12/21  1755 10/13/21  0321 10/14/21  0307   SODIUM 130* 130* 129*   POTASSIUM 3.6 3.7 4.0   CHLORIDE 94* 96* 96*   CO2 25.0 22.0 23.0   BUN 21* 22* 26*   CREATININE 1.31* 1.18 1.15   CALCIUM 8.5* 8.5* 8.9   BILIRUBIN 0.6 0.5 0.5   ALK PHOS 58 56 60   ALT (SGPT) 21 20 22   AST (SGOT) 37 35 32   GLUCOSE 214* 282* 319*       COVID LABS:  Results From Last 14 Days   Lab Units 10/14/21  0307 10/13/21  0321 10/12/21  1755   CRP mg/dL 5.40* 9.37*  --    D DIMER QUANT mg/L (FEU)  --   --  1.35*   FERRITIN ng/mL 1,502.00* 1,243.00* 1,328.00*   LACTATE mmol/L  --   --  1.7   LDH U/L  --   --  694*   PROCALCITONIN ng/mL 0.26*  --  0.33*   TROPONIN T ng/mL  --  <0.010 <0.010   TRIGLYCERIDES mg/dL 270*  --   --        Component   Ref Range & Units 1 d ago   Hemoglobin A1C   4.80 - 5.60 % 8.40 High     Resulting Agency [facility]         Estimated Creatinine Clearance: 131.6 mL/min (by C-G formula based on SCr of 1.15 mg/dL).    Culture Results:    Microbiology Results (last 10 days)     Procedure Component Value - Date/Time    Blood Culture - Blood, Arm, Left [[id number]]  (Normal) Collected: 10/12/21 1830    Lab Status: Preliminary result Specimen: Blood from Arm, Left Updated: 10/13/21 1900     Blood Culture No growth at 24 hours    COVID-19,[facility] IN-HOUSE,Nasal Swab No Transport Media 3-4 HR TAT - Swab, Nasal Cavity [[id number]]  (Abnormal) Collected: 10/12/21 1757    Lab Status: Final result Specimen: Swab from Nasal Cavity Updated: 10/12/21 1855     COVID19 Detected    Narrative:      Fact sheet for providers: https://www.fda.gov/media/814563/download     Fact sheet for patients: https://www.fda.gov/media/519936/download    Test performed by PCR.    Consider negative results in combination with clinical observations, patient history, and epidemiological information.    Blood Culture - Blood, Arm, Left [[id number]]  (Normal) Collected: 10/12/21 8490    Lab Status: Preliminary result  Specimen: Blood from Arm, Left Updated: 10/13/21 1900     Blood Culture No growth at 24 hours               Radiology:   Imaging Results (Last 72 Hours)     Procedure Component Value Units Date/Time    CT Angiogram Chest [[id number]] Collected: 10/12/21 2037     Updated: 10/12/21 2044    Narrative:      Exam: CT angiogram of the of the chest with intravenous contrast.     CLINICAL HISTORY:  Positive for Covid. Shortness of breath.     DOSE:  666 mGycm. Automated exposure control was utilized to diminish  patient radiation dose.     TECHNIQUE: Contiguous axial images were obtained through the thorax  following intravenous contrast administration with reformatted images  obtained in the sagittal and coronal projections from the original data  set. Three-dimensional reconstructions are also obtained. The study is  significantly degraded by breathing motion.     COMPARISON:  None available     FINDINGS:     Pulmonary arteries:  I do not see any gross evidence of pulmonary  thromboembolic disease but the study is degraded by breathing motion.  This results in obscuration of the more distal segmental and  subsegmental pulmonary artery branches.     Aorta:  The thoracic aorta and proximal great vessels are normal in  appearance. There is no evidence of aortic dissection or aneurysm.     LUNGS:  Multifocal rather extensive mixed groundglass and alveolar  consolidation is noted within both lungs. This is felt to represent  extensive Covid pneumonia.     Pleural spaces: Unremarkable. No evidence of pleural effusion or  pneumothorax.     HEART: No evidence of enlargement. No coronary artery calcifications are  present. There is no evidence of pericardial effusion. No evidence of RV  dysfunction.     Bones: No acute osseous abnormalities are demonstrated.     CHEST WALL: Left-sided gynecomastia is present. No additional chest wall  abnormalities are demonstrated.     Lymph nodes: No enlarged mediastinal or axillary  lymphadenopathy is  present.     Upper abdomen: Limited images of the upper abdomen are unremarkable.       Impression:      1. No gross evidence of pulmonary thromboembolic disease. There is  respiratory motion on today's exam and obscuring some of the more distal  segmental and subsegmental pulmonary artery branches.  2. The thoracic aorta and proximal great vessels are unremarkable.  3. Rather extensive mixed groundglass and alveolar consolidation within  both lungs consistent with rather severe Covid pneumonia.  This report was finalized on 10/12/2021 20:41 by Dr. José Miguel Hays MD.    XR Chest 1 View [[id number]] Collected: 10/12/21 1820     Updated: 10/12/21 1823    Narrative:      EXAMINATION:  XR CHEST 1 VW-  10/12/2021 6:15 PM CDT     HISTORY: Shortness of breath.     COMPARISON: No comparison study.     FINDINGS:  There are patchy groundglass parenchymal infiltrates  bilaterally. Heart size is upper limits of normal. The inspiration is  not very deep. No significant pleural effusion is seen. No acute bony  abnormality is seen.       Impression:      Bilateral groundglass parenchymal infiltrates worrisome for  Covid 19 infection.        This report was finalized on 10/12/2021 18:20 by Dr. Placido Eller MD.          Assessment/Plan     Active Hospital Problems    Diagnosis    • **Pneumonia due to COVID-19 virus    • Acute respiratory failure with hypoxia (HCC)        IMPRESSION:  COVID-19 infection with pneumonia-approximately day #12 from symptom onset and day #11 from positive test.    He did not receive monoclonal antibody therapy.  He did receive Tocilizumab October 13.    Acute respiratory failure with severe hypoxia.  Patient was on Vapotherm and ended up requiring BiPAP overnight.    Morbid obesity    New diagnosis of diabetes mellitus with hemoglobin A1c of 8.4    Elevated CRP-improved      RECOMMENDATION:   Continue dexamethasone  Continue enhanced droplet precautions  O2 support to maintain  adequate saturations.  VTE prophylaxis per attending  Aggressive attempts at proning and semiproning.  Diabetes management per attending  Adjuvant therapy per attending    Discussed with MARINO Jackson MD  10/14/21  07:31 CDT

## 2021-10-15 NOTE — PROGRESS NOTES
PULMONARY AND CRITICAL CARE PROGRESS NOTE - [facility]    Patient: Abebe Esquivel  1980   MR# [id number]   Acct# [id number]  10/15/21   08:01 CDT  Referring Provider: Dustin Zuniga MD    Chief Complaint: Covid-19     Interval history: The patient was seen from outside the glass to preserve PPE and limit exposure. He is seen awake and alert sitting up in chair. Oxygen saturation 94% on vapotherm 40l fio2 1.0. Respiratory rate 28. C-rp trending down, 2.16. He is afebrile. No new issues reported.       Meds:  albuterol sulfate HFA, 2 puff, Inhalation, 4x Daily - RT  artificial tears, , Both Eyes, Q4H  ascorbic acid, 500 mg, Oral, Daily  atorvastatin, 10 mg, Oral, Nightly  cholecalciferol, 2,000 Units, Oral, Daily  dexamethasone, 6 mg, Oral, Daily With Breakfast  enoxaparin, 40 mg, Subcutaneous, Q12H  famotidine, 20 mg, Oral, BID AC  guaiFENesin, 1,200 mg, Oral, BID  insulin detemir, 10 Units, Subcutaneous, Q12H  insulin lispro, 0-14 Units, Subcutaneous, TID AC  linagliptin, 5 mg, Oral, Daily  sodium chloride, 10 mL, Intravenous, Q12H  zinc sulfate, 220 mg, Oral, Daily      sodium chloride, 50 mL/hr, Last Rate: 50 mL/hr (10/14/21 1123)      Review of Systems:   Review of Systems   Constitutional: Negative for fever.   Respiratory: Positive for cough and shortness of breath.    Cardiovascular: Negative for chest pain.   Gastrointestinal: Positive for diarrhea. Negative for nausea and vomiting.   :    Physical Exam:  SpO2 Percentage    10/15/21 0600 10/15/21 0700 10/15/21 0730   SpO2: 91% 92% 94%     Temp:  [97.7 °F (36.5 °C)-98.9 °F (37.2 °C)] 97.9 °F (36.6 °C)  Heart Rate:  [61-84] 61  Resp:  [16-34] 31  BP: (114-140)/(62-93) 117/80    Intake/Output Summary (Last 24 hours) at 10/15/2021 0801  Last data filed at 10/15/2021 0737  Gross per 24 hour   Intake 2231.11 ml   Output 2250 ml   Net -18.89 ml     GENERAL/CONSTITUTIONAL: No acute distress, awake and alert   HEENT: atraumatic,  normocephalic  NOSE: Vapotherm in place  NECK: jugular veins nondistended, thick  CHEST: no paradox, no retractions.  No respiratory distress.   CARDIAC: Sinus rhythm, rate 61  ABDOMEN: non-distended, obese  : Defer  EXTREMITIES: Not visible  NEURO: Awake, alert and oriented  SKIN: no jaundice.  No rash    Laboratory Data:  Results from last 7 days   Lab Units 10/15/21  0323 10/14/21  0307 10/13/21  0321   WBC 10*3/mm3 10.59 8.45 6.00   HEMOGLOBIN g/dL 15.2 15.4 14.6   PLATELETS 10*3/mm3 322 254 178     Results from last 7 days   Lab Units 10/15/21  0323 10/14/21  0307 10/13/21  0321 10/13/21  0321 10/12/21  1755 10/12/21  1755   SODIUM mmol/L 132* 129*  --  130*   < > 130*   POTASSIUM mmol/L 3.8 4.0  --  3.7   < > 3.6   BUN mg/dL 34* 26*  --  22*   < > 21*   CREATININE mg/dL 1.14 1.15  --  1.18   < > 1.31*   CRP mg/dL 2.16* 5.40*   < > 9.37*  --   --    PROCALCITONIN ng/mL  --  0.26*  --   --   --  0.33*   FERRITIN ng/mL  --  1,502.00*  --  1,243.00*   < > 1,328.00*   D DIMER QUANT mg/L (FEU)  --   --   --   --   --  1.35*    < > = values in this interval not displayed.     Results from last 7 days   Lab Units 10/12/21  1755   PH, ARTERIAL pH units 7.498*   PCO2, ARTERIAL mm Hg 30.1*   PO2 ART mm Hg 45.8*     Blood Culture   Date Value Ref Range Status   10/12/2021 No growth at 24 hours  Preliminary   10/12/2021 No growth at 24 hours  Preliminary     Recent films:  No radiology results for the last day  Films reviewed personally by me.  My interpretation: None today, 10-15-21    Pulmonary Assessment:    1. Covid-19, received tociluzimab  2. Acute hypoxemic respiratory failure secondary to Covid  3. Obesity  4. Unvaccinated status, did not receive Regeneron    Recommend:     · Supplemental oxygen and titrate as tolerated for goal around 90% sat, Currently on vapotherm 40l fio2 1.0  · BiPAP as needed, 16/8 FiO2 0.8  · DVT prophylaxis, Lovenox 40 mg twice daily  · Mobilize as tolerated  · Proning candidate: Yes    · Dexamethasone day 4 of 10, daily   · Encourage nutrition and mobilization  · Stress ulcer prophylaxis, Pepcid while on dexamethasone  · Continue bronchodilators, albuterol  · Continue guaifenesin   · Prognosis guarded    Electronically signed by CESIA Manuel, 10/15/21, 08:01 CDT     Physician substantive portion:  Remains on high flow oxygen.  He has no new complaints.  He sitting up awake alert respirations are unlabored.  Recommend continue oxygen, currently still needing high flow Vapotherm BiPAP as needed continue dexamethasone.  Trajectory uncertain.    I have seen and examined patient personally, performing a face-to-face diagnostic evaluation with plan of care reviewed and developed with APRN and nursing staff. I have addended and/or modified the above history of present illness, physical examination, and assessment and plan to reflect my findings and impressions. Essential elements of the care plan were discussed with APRN above.  Agree with findings and assessment/plan as documented above.    Electronically signed by Raza Gonzalez MD, on 10/15/2021, 12:36 CDT

## 2021-10-15 NOTE — PROGRESS NOTES
[facility]  INPATIENT PROGRESS NOTE    Length of Stay: 3  Date of Admission: 10/12/2021  Primary Care Physician: Froylan Littlejohn DO    Subjective   Chief Complaint: Covid pneumonia/respiratory failure/morbid obesity.    HPI   Patient is currently on Vapotherm 40/100.  Patient no acute distress.  T-max 98.9.  T-current 97.9.  Blood pressure stable.    Review of Systems   Constitutional: Positive for activity change, appetite change and fatigue. Negative for chills and fever.   HENT: Negative for hearing loss, nosebleeds, tinnitus and trouble swallowing.    Eyes: Negative for visual disturbance.   Respiratory: Positive for cough and shortness of breath. Negative for chest tightness and wheezing.    Cardiovascular: Negative for chest pain, palpitations and leg swelling.   Gastrointestinal: Negative for abdominal distention, abdominal pain, blood in stool, constipation, diarrhea, nausea and vomiting.   Endocrine: Negative for cold intolerance, heat intolerance, polydipsia, polyphagia and polyuria.   Genitourinary: Negative for decreased urine volume, difficulty urinating, dysuria, flank pain, frequency and hematuria.   Musculoskeletal: Negative for arthralgias, joint swelling and myalgias.   Skin: Negative for rash.   Allergic/Immunologic: Negative for immunocompromised state.   Neurological: Negative for dizziness, syncope, weakness, light-headedness and headaches.   Hematological: Negative for adenopathy. Does not bruise/bleed easily.   Psychiatric/Behavioral: Negative for confusion and sleep disturbance. The patient is not nervous/anxious.         All pertinent negatives and positives are as above. All other systems have been reviewed and are negative unless otherwise stated.     Objective    Temp:  [97.7 °F (36.5 °C)-98.9 °F (37.2 °C)] 97.9 °F (36.6 °C)  Heart Rate:  [61-84] 73  Resp:  [16-34] 22  BP: (105-140)/(62-93) 105/68    Intake/Output Summary (Last  24 hours) at 10/15/2021 1054  Last data filed at 10/15/2021 0737  Gross per 24 hour   Intake 2031.11 ml   Output 1750 ml   Net 281.11 ml     Physical Exam  Vitals and nursing note reviewed.   Constitutional:       Appearance: He is well-developed.   HENT:      Head: Normocephalic.   Eyes:      Conjunctiva/sclera: Conjunctivae normal.      Pupils: Pupils are equal, round, and reactive to light.   Neck:      Vascular: No JVD.   Cardiovascular:      Rate and Rhythm: Normal rate and regular rhythm.      Heart sounds: Normal heart sounds. No murmur heard.  No friction rub. No gallop.    Pulmonary:      Effort: No respiratory distress.      Breath sounds: No wheezing or rales.      Comments: On Vapotherm.  Decrease in breath sound bilateral, slight wheezing.  Chest:      Chest wall: No tenderness.   Abdominal:      General: Bowel sounds are normal. There is no distension.      Palpations: Abdomen is soft.      Tenderness: There is no abdominal tenderness. There is no guarding or rebound.      Comments: Morbid obesity.  BMI is 44.   Musculoskeletal:         General: No tenderness or deformity. Normal range of motion.      Cervical back: Neck supple.   Skin:     General: Skin is warm and dry.      Capillary Refill: Capillary refill takes 2 to 3 seconds.      Findings: No rash.   Neurological:      Mental Status: He is alert and oriented to person, place, and time.      Cranial Nerves: No cranial nerve deficit.      Motor: Weakness present. No abnormal muscle tone.      Coordination: Coordination abnormal.      Gait: Gait abnormal.      Deep Tendon Reflexes: Reflexes normal.   Psychiatric:         Behavior: Behavior normal.         Thought Content: Thought content normal.   Results Review:  Lab Results (last 24 hours)     Procedure Component Value Units Date/Time    POC Glucose Once [[id number]]  (Abnormal) Collected: 10/15/21 0832    Specimen: Blood Updated: 10/15/21 0843     Glucose 292 mg/dL      Comment: : [id number]  Demetrio Morataya ID: [id number]       POC Glucose Once [[id number]]  (Abnormal) Collected: 10/15/21 0638    Specimen: Blood Updated: 10/15/21 0649     Glucose 288 mg/dL      Comment: : [id number] Donovan De La Torre ID: [id number]       Comprehensive Metabolic Panel [[id number]]  (Abnormal) Collected: 10/15/21 0323    Specimen: Blood Updated: 10/15/21 0441     Glucose 322 mg/dL      BUN 34 mg/dL      Creatinine 1.14 mg/dL      Sodium 132 mmol/L      Potassium 3.8 mmol/L      Chloride 97 mmol/L      CO2 23.0 mmol/L      Calcium 8.6 mg/dL      Total Protein 7.2 g/dL      Albumin 3.10 g/dL      ALT (SGPT) 21 U/L      AST (SGOT) 30 U/L      Alkaline Phosphatase 83 U/L      Total Bilirubin 0.5 mg/dL      eGFR Non African Amer 71 mL/min/1.73      Globulin 4.1 gm/dL      A/G Ratio 0.8 g/dL      BUN/Creatinine Ratio 29.8     Anion Gap 12.0 mmol/L     Narrative:      GFR Normal >60  Chronic Kidney Disease <60  Kidney Failure <15      C-reactive Protein [[id number]]  (Abnormal) Collected: 10/15/21 0323    Specimen: Blood Updated: 10/15/21 0426     C-Reactive Protein 2.16 mg/dL     CBC & Differential [[id number]]  (Abnormal) Collected: 10/15/21 0323    Specimen: Blood Updated: 10/15/21 0402    Narrative:      The following orders were created for panel order CBC & Differential.  Procedure                               Abnormality         Status                     ---------                               -----------         ------                     CBC Auto Differential[[id number]]        Abnormal            Final result                 Please view results for these tests on the individual orders.    CBC Auto Differential [[id number]]  (Abnormal) Collected: 10/15/21 0323    Specimen: Blood Updated: 10/15/21 0402     WBC 10.59 10*3/mm3      RBC 5.56 10*6/mm3      Hemoglobin 15.2 g/dL      Hematocrit 45.9 %      MCV 82.6 fL      MCH 27.3 pg      MCHC 33.1 g/dL      RDW 13.7 %      RDW-SD 41.1 fl      MPV 10.7 fL       Platelets 322 10*3/mm3      Neutrophil % 81.6 %      Lymphocyte % 8.7 %      Monocyte % 7.3 %      Eosinophil % 0.0 %      Basophil % 0.4 %      Immature Grans % 2.0 %      Neutrophils, Absolute 8.65 10*3/mm3      Lymphocytes, Absolute 0.92 10*3/mm3      Monocytes, Absolute 0.77 10*3/mm3      Eosinophils, Absolute 0.00 10*3/mm3      Basophils, Absolute 0.04 10*3/mm3      Immature Grans, Absolute 0.21 10*3/mm3      nRBC 0.0 /100 WBC     POC Glucose Once [[id number]]  (Abnormal) Collected: 10/14/21 2256    Specimen: Blood Updated: 10/14/21 2307     Glucose 314 mg/dL      Comment: : [id number] Donovan [employer] ID: [id number]       POC Glucose Once [[id number]]  (Abnormal) Collected: 10/14/21 2043    Specimen: Blood Updated: 10/14/21 2055     Glucose 317 mg/dL      Comment: : [id number] Donovan [employer] ID: [id number]       Blood Culture - Blood, Arm, Left [[id number]]  (Normal) Collected: 10/12/21 1830    Specimen: Blood from Arm, Left Updated: 10/14/21 1900     Blood Culture No growth at 2 days    Blood Culture - Blood, Arm, Left [[id number]]  (Normal) Collected: 10/12/21 1755    Specimen: Blood from Arm, Left Updated: 10/14/21 1900     Blood Culture No growth at 2 days    POC Glucose Once [[id number]]  (Abnormal) Collected: 10/14/21 1803    Specimen: Blood Updated: 10/14/21 1814     Glucose 335 mg/dL      Comment: : [id number] Kirti ZynstraGenny ID: [id number]       POC Glucose Once [[id number]]  (Abnormal) Collected: 10/14/21 1116    Specimen: Blood Updated: 10/14/21 1137     Glucose 310 mg/dL      Comment: : [id number] Kirti ZynstraGenny ID: [id number]              Cultures:  Blood Culture   Date Value Ref Range Status   10/12/2021 No growth at 2 days  Preliminary   10/12/2021 No growth at 2 days  Preliminary       Radiology Data:    Imaging Results (Last 24 Hours)     ** No results found for the last 24 hours. **          No Known Allergies    Scheduled meds:   albuterol sulfate HFA, 2  puff, Inhalation, 4x Daily - RT  artificial tears, , Both Eyes, Q4H  ascorbic acid, 500 mg, Oral, Daily  atorvastatin, 10 mg, Oral, Nightly  cholecalciferol, 2,000 Units, Oral, Daily  dexamethasone, 6 mg, Oral, Daily With Breakfast  enoxaparin, 40 mg, Subcutaneous, Q12H  famotidine, 20 mg, Oral, BID AC  guaiFENesin, 1,200 mg, Oral, BID  insulin detemir, 10 Units, Subcutaneous, Q12H  insulin lispro, 0-14 Units, Subcutaneous, TID AC  linagliptin, 5 mg, Oral, Daily  sodium chloride, 10 mL, Intravenous, Q12H  zinc sulfate, 220 mg, Oral, Daily        PRN meds:  •  acetaminophen **OR** acetaminophen  •  artificial tears  •  benzonatate  •  dextromethorphan polistirex ER  •  dextrose  •  dextrose  •  glucagon (human recombinant)  •  ondansetron  •  sodium chloride    Assessment/Plan       Pneumonia due to COVID-19 virus    Acute respiratory failure with hypoxia (HCC)      Plan:  Covid pneumonia/acute respiratory failure hypoxia.  Patient has not been vaccinated.  Continue Decadron.  Consult pulmonary.  Consult infectious disease.  Albuterol inhaler.  Tessalon Perles as needed.  Zinc.  Vitamin D.  Vitamin C.  Decadron.  Lovenox prophylaxis.  Lipitor. Status post Tocilizumab 10/13/2021. Delsym as needed. Mucinex.  Chest x-ray-Bilateral groundglass parenchymal infiltrates worrisome for Covid 19 infection.  CTA of the chest-No gross evidence of pulmonary thromboembolic disease, thoracic aorta and proximal great vessels are unremarkable, extensive mixed groundglass and alveolar consolidation within both lungs consistent with rather severe Covid pneumonia.  Echocardiogram-ejection fraction 61 to 65%.  Currently on Vapotherm 40/100.     Reflux.  Pepcid.  Zofran as needed.     Acute kidney injury.  Creatinine is improving.  Cut back IV hydration     Hyponatremia.  Improving  Normal saline, slow IV normal saline.     Diabetes/hyperglycemia.    Moderate to high sliding scale.   Increase Levemir.  Hemoglobin A1c 8.4.   Continue  Tradjenta.     Morbid obesity.  BMI is 44.     Nutrition.  Regular/consistent carb diet.     Blood culture pending.  Covid-19-positive     Discharge Planning:  3 to 6 days. Guarded.    Electronically signed by Dustin Zuniga MD, 10/15/21, 10:54 AM CDT.

## 2021-10-15 NOTE — PROGRESS NOTES
INFECTIOUS DISEASES PROGRESS NOTE    Patient:  Abebe Esquivel  YOB: 1980  MRN: [id number]   Admit date: 10/12/2021   Admitting Physician: Dustin Zuniga MD  Primary Care Physician: Froylan Littlejohn DO    Chief Complaint: COVID-19 infection        Interval History: Reviewed with MARINO Kruse.  Patient was not tolerating BiPAP as he did not like it.  He is been stable on 40 L and 100% on Vapotherm.  He prefers to be up in the chair    He reportedly proned about 30 minutes last night      Allergies: No Known Allergies    Current Meds:     Current Facility-Administered Medications:   •  acetaminophen (TYLENOL) tablet 650 mg, 650 mg, Oral, Q4H PRN **OR** acetaminophen (TYLENOL) suppository 650 mg, 650 mg, Rectal, Q4H PRN, Harris Harden DO  •  albuterol sulfate HFA (PROVENTIL HFA;VENTOLIN HFA;PROAIR HFA) inhaler 2 puff, 2 puff, Inhalation, 4x Daily - RT, Harris Harden DO, 2 puff at 10/15/21 1047  •  artificial tears ophthalmic ointment, , Both Eyes, Q4H, Raza Gonzalez MD, Given at 10/15/21 0747  •  artificial tears ophthalmic ointment, , Both Eyes, PRN, Raza Gonzalez MD  •  ascorbic acid (VITAMIN C) tablet 500 mg, 500 mg, Oral, Daily, Harris Harden DO, 500 mg at 10/15/21 0834  •  atorvastatin (LIPITOR) tablet 10 mg, 10 mg, Oral, Nightly, Harris Harden DO, 10 mg at 10/14/21 2040  •  benzonatate (TESSALON) capsule 200 mg, 200 mg, Oral, TID PRN, Harris Harden DO, 200 mg at 10/13/21 0208  •  cholecalciferol (VITAMIN D3) tablet 2,000 Units, 2,000 Units, Oral, Daily, Dustin Zuniga MD, 2,000 Units at 10/15/21 0834  •  dexamethasone (DECADRON) tablet 6 mg, 6 mg, Oral, Daily With Breakfast, Harris Harden DO, 6 mg at 10/15/21 0746  •  dextromethorphan polistirex ER (DELSYM) 30 MG/5ML oral suspension 60 mg, 60 mg, Oral, BID PRN, Dustin Zuniga MD  •  dextrose (D50W) (25 g/50 mL) IV injection 25 g, 25 g, Intravenous, Q15 Min PRN, Dustin Zuniga MD  •   "dextrose (GLUTOSE) oral gel 15 g, 15 g, Oral, Q15 Min PRN, Dustin Zuniga MD  •  enoxaparin (LOVENOX) syringe 40 mg, 40 mg, Subcutaneous, Q12H, Harris Harden DO, 40 mg at 10/15/21 0835  •  famotidine (PEPCID) tablet 20 mg, 20 mg, Oral, BID AC, Harris Harden DO, 20 mg at 10/15/21 0745  •  glucagon (human recombinant) (GLUCAGEN DIAGNOSTIC) injection 1 mg, 1 mg, Subcutaneous, Q15 Min PRN, Dustin Zuniga MD  •  guaiFENesin (MUCINEX) 12 hr tablet 1,200 mg, 1,200 mg, Oral, BID, Lorene Pizarro APRN, 1,200 mg at 10/15/21 0835  •  insulin detemir (LEVEMIR) injection 15 Units, 15 Units, Subcutaneous, Q12H, Dustin Zuniga MD  •  insulin lispro (humaLOG) injection 0-14 Units, 0-14 Units, Subcutaneous, TID AC, Dustin Zuniga MD, 10 Units at 10/15/21 1114  •  linagliptin (TRADJENTA) tablet 5 mg, 5 mg, Oral, Daily, Dustin Zuniga MD, 5 mg at 10/15/21 0834  •  ondansetron (ZOFRAN) injection 4 mg, 4 mg, Intravenous, Q6H PRN, Harris Harden DO  •  sodium chloride 0.9 % flush 10 mL, 10 mL, Intravenous, Q12H, Harris Harden DO, 10 mL at 10/15/21 0835  •  sodium chloride 0.9 % flush 10 mL, 10 mL, Intravenous, PRN, Harris Harden DO  •  sodium chloride 0.9 % infusion, 50 mL/hr, Intravenous, Continuous, Dustin Zuniga MD, Last Rate: 50 mL/hr at 10/14/21 1123, 50 mL/hr at 10/14/21 1123  •  zinc sulfate (ZINCATE) capsule 220 mg, 220 mg, Oral, Daily, Harris Harden DO, 220 mg at 10/15/21 0834      Review of Systems   Not obtained directly from patient    Objective     Vital Signs:  Temp (24hrs), Av.3 °F (36.8 °C), Min:97.7 °F (36.5 °C), Max:98.9 °F (37.2 °C)      /68   Pulse 73   Temp 98.4 °F (36.9 °C) (Axillary)   Resp 22   Ht 182.9 cm (72.01\")   Wt (!) 156 kg (344 lb 9.6 oz)   SpO2 91%   BMI 46.73 kg/m²         Physical Exam  Patient evaluated earlier through the glass door.  He was sitting up in the recliner sleeping.  HEENT: Vapotherm in place.  40 L 100%.  Saturations are 91 to " 93% on the monitor.  Cardiac: Rhythm regular in rate in the 70s on the monitor  Psych: No reported agitation.    Results Review:    I reviewed the patient's new clinical results.    Lab Results:  CBC:   Lab Results   Lab 10/12/21  1755 10/13/21  0321 10/14/21  0307 10/15/21  0323   WBC 6.47 6.00 8.45 10.59   HEMOGLOBIN 15.0 14.6 15.4 15.2   HEMATOCRIT 44.5 43.2 44.9 45.9   PLATELETS 188 178 254 322         CMP:   Lab Results   Lab 10/13/21  0321 10/14/21  0307 10/15/21  0323   SODIUM 130* 129* 132*   POTASSIUM 3.7 4.0 3.8   CHLORIDE 96* 96* 97*   CO2 22.0 23.0 23.0   BUN 22* 26* 34*   CREATININE 1.18 1.15 1.14   CALCIUM 8.5* 8.9 8.6   BILIRUBIN 0.5 0.5 0.5   ALK PHOS 56 60 83   ALT (SGPT) 20 22 21   AST (SGOT) 35 32 30   GLUCOSE 282* 319* 322*       COVID LABS:  Results From Last 14 Days   Lab Units 10/15/21  0323 10/14/21  0307 10/13/21  0321 10/12/21  1755   CRP mg/dL 2.16* 5.40* 9.37*  --    D DIMER QUANT mg/L (FEU)  --   --   --  1.35*   FERRITIN ng/mL  --  1,502.00* 1,243.00* 1,328.00*   LACTATE mmol/L  --   --   --  1.7   LDH U/L  --   --   --  694*   PROCALCITONIN ng/mL  --  0.26*  --  0.33*   TROPONIN T ng/mL  --   --  <0.010 <0.010   TRIGLYCERIDES mg/dL  --  270*  --   --        Component   Ref Range & Units 1 d ago   Hemoglobin A1C   4.80 - 5.60 % 8.40 High     Resulting Agency  [facility]         Estimated Creatinine Clearance: 132.8 mL/min (by C-G formula based on SCr of 1.14 mg/dL).    Culture Results:    Microbiology Results (last 10 days)     Procedure Component Value - Date/Time    Blood Culture - Blood, Arm, Left [[id number]]  (Normal) Collected: 10/12/21 1830    Lab Status: Preliminary result Specimen: Blood from Arm, Left Updated: 10/14/21 1900     Blood Culture No growth at 2 days    COVID-19,[facility] IN-HOUSE,Nasal Swab No Transport Media 3-4 HR TAT - Swab, Nasal Cavity [[id number]]  (Abnormal) Collected: 10/12/21 1757    Lab Status: Final result Specimen: Swab from Nasal Cavity Updated: 10/12/21  1855     COVID19 Detected    Narrative:      Fact sheet for providers: https://www.fda.gov/media/616405/download     Fact sheet for patients: https://www.fda.gov/media/694756/download    Test performed by PCR.    Consider negative results in combination with clinical observations, patient history, and epidemiological information.    Blood Culture - Blood, Arm, Left [[id number]]  (Normal) Collected: 10/12/21 1755    Lab Status: Preliminary result Specimen: Blood from Arm, Left Updated: 10/14/21 1900     Blood Culture No growth at 2 days               Radiology:   Imaging Results (Last 72 Hours)     Procedure Component Value Units Date/Time    CT Angiogram Chest [[id number]] Collected: 10/12/21 2037     Updated: 10/12/21 2044    Narrative:      Exam: CT angiogram of the of the chest with intravenous contrast.     CLINICAL HISTORY:  Positive for Covid. Shortness of breath.     DOSE:  666 mGycm. Automated exposure control was utilized to diminish  patient radiation dose.     TECHNIQUE: Contiguous axial images were obtained through the thorax  following intravenous contrast administration with reformatted images  obtained in the sagittal and coronal projections from the original data  set. Three-dimensional reconstructions are also obtained. The study is  significantly degraded by breathing motion.     COMPARISON:  None available     FINDINGS:     Pulmonary arteries:  I do not see any gross evidence of pulmonary  thromboembolic disease but the study is degraded by breathing motion.  This results in obscuration of the more distal segmental and  subsegmental pulmonary artery branches.     Aorta:  The thoracic aorta and proximal great vessels are normal in  appearance. There is no evidence of aortic dissection or aneurysm.     LUNGS:  Multifocal rather extensive mixed groundglass and alveolar  consolidation is noted within both lungs. This is felt to represent  extensive Covid pneumonia.     Pleural spaces: Unremarkable. No  evidence of pleural effusion or  pneumothorax.     HEART: No evidence of enlargement. No coronary artery calcifications are  present. There is no evidence of pericardial effusion. No evidence of RV  dysfunction.     Bones: No acute osseous abnormalities are demonstrated.     CHEST WALL: Left-sided gynecomastia is present. No additional chest wall  abnormalities are demonstrated.     Lymph nodes: No enlarged mediastinal or axillary lymphadenopathy is  present.     Upper abdomen: Limited images of the upper abdomen are unremarkable.       Impression:      1. No gross evidence of pulmonary thromboembolic disease. There is  respiratory motion on today's exam and obscuring some of the more distal  segmental and subsegmental pulmonary artery branches.  2. The thoracic aorta and proximal great vessels are unremarkable.  3. Rather extensive mixed groundglass and alveolar consolidation within  both lungs consistent with rather severe Covid pneumonia.  This report was finalized on 10/12/2021 20:41 by Dr. José Miguel Hays MD.    XR Chest 1 View [[id number]] Collected: 10/12/21 1820     Updated: 10/12/21 1823    Narrative:      EXAMINATION:  XR CHEST 1 VW-  10/12/2021 6:15 PM CDT     HISTORY: Shortness of breath.     COMPARISON: No comparison study.     FINDINGS:  There are patchy groundglass parenchymal infiltrates  bilaterally. Heart size is upper limits of normal. The inspiration is  not very deep. No significant pleural effusion is seen. No acute bony  abnormality is seen.       Impression:      Bilateral groundglass parenchymal infiltrates worrisome for  Covid 19 infection.        This report was finalized on 10/12/2021 18:20 by Dr. Placido Eller MD.          Assessment/Plan     Active Hospital Problems    Diagnosis    • **Pneumonia due to COVID-19 virus    • Acute respiratory failure with hypoxia (HCC)        IMPRESSION:  COVID-19 infection with pneumonia-approximately day #13 from symptom onset and day #12 from positive  test.    He did not receive monoclonal antibody therapy.  He did receive Tocilizumab October 13.    Acute respiratory failure with severe hypoxia.  Patient was on Vapotherm and ended up requiring BiPAP overnight.    Morbid obesity    New diagnosis of diabetes mellitus with hemoglobin A1c of 8.4    Elevated CRP-improved        RECOMMENDATION:   Continue dexamethasone  Continue enhanced droplet precautions  O2 support to maintain adequate saturations.  VTE prophylaxis per attending  Diabetes management per attending  Adjuvant therapy per attending    Reviewed with MARINO Kruse to continue to encourage the patient to prone or near prone as much as possible when in bed    Dr. Linton available over weekend if needed    Marguerite Dick MD  10/15/21  12:45 CDT

## 2021-10-15 NOTE — CASE MANAGEMENT/SOCIAL WORK
Continued Stay Note   Jose     Patient Name: Abebe Esquivel  MRN: [id number]  Today's Date: 10/15/2021    Admit Date: 10/12/2021     Discharge Plan     Row Name 10/15/21 1027       Plan    Plan Home    Plan Comments Patient will return home upon discharge.  Patient is eligible for Medicaid, which is currently pending.  Patient may need home O2 at discharge.               Discharge Codes    No documentation.                     DANIEL Oakley

## 2021-10-16 NOTE — PROGRESS NOTES
PULMONARY AND CRITICAL CARE PROGRESS NOTE - [facility]    Patient: Abebe Esquivel  1980   MR# [id number]   Acct# [id number]  10/16/21   11:56 CDT  Referring Provider: Dustin Zuniga MD    Chief Complaint: Covid-19     Interval history: Patient is a obese young  male was seen in the pulmonary rounds in intensive care unit today.  He was seen from outside the room through the glass door to reduce the risk of cross infection exposure and to minimize use of PPE due to COVID-19 positive status.  He was seen and followed by Dr. Gonzalez. He is sitting up in the chair and he did not use his BiPAP last night.  This morning he is on Vapotherm 40 L flow with 95% FiO2 and oxygen saturation is 94%.  He is feeling overall better and doing some incentive spirometry and flutter valve.  He did not have any fever and did not have any acute events overnight.    Meds:  albuterol sulfate HFA, 2 puff, Inhalation, 4x Daily - RT  ascorbic acid, 500 mg, Oral, Daily  atorvastatin, 20 mg, Oral, Nightly  cholecalciferol, 2,000 Units, Oral, Daily  dexamethasone, 6 mg, Oral, Daily With Breakfast  enoxaparin, 40 mg, Subcutaneous, Q12H  famotidine, 20 mg, Oral, BID AC  guaiFENesin, 1,200 mg, Oral, BID  insulin detemir, 20 Units, Subcutaneous, Q12H  insulin lispro, 0-24 Units, Subcutaneous, TID AC  linagliptin, 5 mg, Oral, Daily  sodium chloride, 10 mL, Intravenous, Q12H  zinc sulfate, 220 mg, Oral, Daily         Review of Systems:   Review of Systems   Constitutional: Negative for fever.   Respiratory: Positive for cough and shortness of breath.    Cardiovascular: Negative for chest pain.   Gastrointestinal: Positive for diarrhea. Negative for nausea and vomiting.   :    Physical Exam:  SpO2 Percentage    10/16/21 0900 10/16/21 1000 10/16/21 1125   SpO2: 93% 95% 96%     Temp:  [98.2 °F (36.8 °C)-98.4 °F (36.9 °C)] 98.3 °F (36.8 °C)  Heart Rate:  [62-87] 78  Resp:  [19-34] 20  BP: ()/(69-92)  122/69    Intake/Output Summary (Last 24 hours) at 10/16/2021 1156  Last data filed at 10/16/2021 1145  Gross per 24 hour   Intake 1953.6 ml   Output 2450 ml   Net -496.4 ml     GENERAL/CONSTITUTIONAL: No acute distress, awake and alert   HEENT: atraumatic, normocephalic  NOSE: Vapotherm in place  NECK: jugular veins nondistended, thick  CHEST: no paradox, no retractions.  No respiratory distress.   CARDIAC: Sinus rhythm, rate 61  ABDOMEN: non-distended, obese  : Defer  EXTREMITIES: Not visible  NEURO: Awake, alert and oriented  SKIN: no jaundice.  No rash    Laboratory Data:  Results from last 7 days   Lab Units 10/16/21  0511 10/15/21  0323 10/14/21  0307   WBC 10*3/mm3 9.60 10.59 8.45   HEMOGLOBIN g/dL 15.2 15.2 15.4   PLATELETS 10*3/mm3 284 322 254     Results from last 7 days   Lab Units 10/16/21  0511 10/16/21  0042 10/15/21  0323 10/14/21  0307 10/14/21  0307 10/13/21  0321 10/12/21  1755   SODIUM mmol/L 136  --  132*  --  129*   < > 130*   POTASSIUM mmol/L 3.6  --  3.8  --  4.0   < > 3.6   BUN mg/dL 32*  --  34*  --  26*   < > 21*   CREATININE mg/dL 1.10  --  1.14  --  1.15   < > 1.31*   CRP mg/dL 0.93*  --  2.16*   < > 5.40*   < >  --    PROCALCITONIN ng/mL  --  0.14  --   --  0.26*  --  0.33*   FERRITIN ng/mL  --  1,641.00*  --   --  1,502.00*   < > 1,328.00*   D DIMER QUANT mg/L (FEU)  --   --   --   --   --   --  1.35*    < > = values in this interval not displayed.     Results from last 7 days   Lab Units 10/12/21  1755   PH, ARTERIAL pH units 7.498*   PCO2, ARTERIAL mm Hg 30.1*   PO2 ART mm Hg 45.8*     Blood Culture   Date Value Ref Range Status   10/12/2021 No growth at 24 hours  Preliminary   10/12/2021 No growth at 24 hours  Preliminary     Recent films:  No radiology results for the last day  Films reviewed personally by me.  My interpretation: None today, 10-15-21    Pulmonary Assessment:    1. Covid-19, received tociluzimab  2. Acute hypoxemic respiratory failure secondary to  Covid  3. Obesity  4. Unvaccinated status, did not receive Regeneron    Recommend:     · Continue supplemental oxygen and titrate as tolerated for goal around 92% oxygen saturation or above.  · Patient did not use BiPAP last night and is currently on Vapotherm.  He is on 4 L flow 0.95 % FiO2.  · We will try to use BiPAP as needed, 16/8 FiO2 0.8.  He is encouraged to use BiPAP.  · He may have underlying sleep apnea as well and may need further work-up as an outpatient when is more stable.  · Continue enhanced isolation for COVID-19.  · Encourage incentive spirometry and flutter valve to improve pulmonary compliance and clearance  · DVT prophylaxis, Lovenox 40 mg twice daily  · Mobilize as tolerated  · Proning candidate: Yes. Self proning will be encouraged.  · Dexamethasone day 4 of 10, daily   · Encourage nutrition and mobilization  · Stress ulcer prophylaxis, Pepcid while on dexamethasone  · Continue bronchodilators, albuterol  · Continue guaifenesin   · Repeat labs and imaging studies and monitor inflammatory markers.  · CODE STATUS: Full.  Overall prognosis: Guarded  · Pulmonary team will continue following and make further recommendations.    Electronically signed by     Thompson Wiley MD  Pulmonologist/Intensivist  10/16/2021 12:03 CDT

## 2021-10-16 NOTE — PROGRESS NOTES
[facility] Medicine Services  INPATIENT PROGRESS NOTE    Length of Stay: 4  Date of Admission: 10/12/2021  Primary Care Physician: Froylan Littlejohn DO    Subjective   Chief Complaint: Covid pneumonia/respiratory failure/morbid obesity.    HPI   Patient is a Vapotherm 40/100.  Patient having chest pain.  Patient states she is feeling better.  T-max 98.4.  T-current 98.4.  Pressure stable.  Will DC IV fluid.  Increase Levemir today and increase sliding scale to high.  Glucose is 300 today.    Review of Systems   Constitutional: Positive for activity change, appetite change and fatigue. Negative for chills and fever.   HENT: Negative for hearing loss, nosebleeds, tinnitus and trouble swallowing.    Eyes: Negative for visual disturbance.   Respiratory: Positive for cough and shortness of breath. Negative for chest tightness and wheezing.    Cardiovascular: Negative for chest pain, palpitations and leg swelling.   Gastrointestinal: Negative for abdominal distention, abdominal pain, blood in stool, constipation, diarrhea, nausea and vomiting.   Endocrine: Negative for cold intolerance, heat intolerance, polydipsia, polyphagia and polyuria.   Genitourinary: Negative for decreased urine volume, difficulty urinating, dysuria, flank pain, frequency and hematuria.   Musculoskeletal: Negative for arthralgias, joint swelling and myalgias.   Skin: Negative for rash.   Allergic/Immunologic: Negative for immunocompromised state.   Neurological: Negative for dizziness, syncope, weakness, light-headedness and headaches.   Hematological: Negative for adenopathy. Does not bruise/bleed easily.   Psychiatric/Behavioral: Negative for confusion and sleep disturbance. The patient is not nervous/anxious.    All pertinent negatives and positives are as above. All other systems have been reviewed and are negative unless otherwise stated.      Objective    Temp:  [98.2 °F (36.8 °C)-98.4 °F (36.9 °C)]  98.4 °F (36.9 °C)  Heart Rate:  [62-87] 73  Resp:  [19-34] 19  BP: ()/(59-92) 122/69    Intake/Output Summary (Last 24 hours) at 10/16/2021 1032  Last data filed at 10/16/2021 0700  Gross per 24 hour   Intake 2150.9 ml   Output 2050 ml   Net 100.9 ml     Physical Exam  Vitals and nursing note reviewed.   Constitutional:       Appearance: He is well-developed.   HENT:      Head: Normocephalic.   Eyes:      Conjunctiva/sclera: Conjunctivae normal.      Pupils: Pupils are equal, round, and reactive to light.   Neck:      Vascular: No JVD.   Cardiovascular:      Rate and Rhythm: Normal rate and regular rhythm.      Heart sounds: Normal heart sounds. No murmur heard.  No friction rub. No gallop.    Pulmonary:      Effort: No respiratory distress.      Breath sounds: No wheezing or rales.      Comments: On Vapotherm.  Decrease in breath sound bilateral, slight wheezing.  Chest:      Chest wall: No tenderness.   Abdominal:      General: Bowel sounds are normal. There is no distension.      Palpations: Abdomen is soft.      Tenderness: There is no abdominal tenderness. There is no guarding or rebound.      Comments: Morbid obesity.  BMI is 47.   Musculoskeletal:         General: No tenderness or deformity. Normal range of motion.      Cervical back: Neck supple.   Skin:     General: Skin is warm and dry.      Capillary Refill: Capillary refill takes 2 to 3 seconds.      Findings: No rash.   Neurological:      Mental Status: He is alert and oriented to person, place, and time.      Cranial Nerves: No cranial nerve deficit.      Motor: Weakness present. No abnormal muscle tone.      Coordination: Coordination abnormal.      Gait: Gait abnormal.      Deep Tendon Reflexes: Reflexes normal.   Psychiatric:         Behavior: Behavior normal.         Thought Content: Thought content normal.   Results Review:  Lab Results (last 24 hours)     Procedure Component Value Units Date/Time    POC Glucose Once [[id number]]  (Abnormal)  Collected: 10/16/21 0647    Specimen: Blood Updated: 10/16/21 0700     Glucose 251 mg/dL      Comment: : [id number] Donovan De La Torre ID: [id number]       Comprehensive Metabolic Panel [[id number]]  (Abnormal) Collected: 10/16/21 0511    Specimen: Blood Updated: 10/16/21 0613     Glucose 300 mg/dL      BUN 32 mg/dL      Creatinine 1.10 mg/dL      Sodium 136 mmol/L      Potassium 3.6 mmol/L      Chloride 103 mmol/L      CO2 23.0 mmol/L      Calcium 8.6 mg/dL      Total Protein 6.6 g/dL      Albumin 3.10 g/dL      ALT (SGPT) 19 U/L      AST (SGOT) 25 U/L      Alkaline Phosphatase 73 U/L      Total Bilirubin 0.5 mg/dL      eGFR Non African Amer 74 mL/min/1.73      Globulin 3.5 gm/dL      A/G Ratio 0.9 g/dL      BUN/Creatinine Ratio 29.1     Anion Gap 10.0 mmol/L     Narrative:      GFR Normal >60  Chronic Kidney Disease <60  Kidney Failure <15      C-reactive Protein [[id number]]  (Abnormal) Collected: 10/16/21 0511    Specimen: Blood Updated: 10/16/21 0613     C-Reactive Protein 0.93 mg/dL     CBC & Differential [[id number]]  (Abnormal) Collected: 10/16/21 0511    Specimen: Blood Updated: 10/16/21 0552    Narrative:      The following orders were created for panel order CBC & Differential.  Procedure                               Abnormality         Status                     ---------                               -----------         ------                     CBC Auto Differential[[id number]]        Abnormal            Final result                 Please view results for these tests on the individual orders.    CBC Auto Differential [[id number]]  (Abnormal) Collected: 10/16/21 0511    Specimen: Blood Updated: 10/16/21 0552     WBC 9.60 10*3/mm3      RBC 5.37 10*6/mm3      Hemoglobin 15.2 g/dL      Hematocrit 44.2 %      MCV 82.3 fL      MCH 28.3 pg      MCHC 34.4 g/dL      RDW 13.7 %      RDW-SD 41.1 fl      MPV 10.7 fL      Platelets 284 10*3/mm3      Neutrophil % 79.5 %      Lymphocyte % 11.0 %      Monocyte %  "6.1 %      Eosinophil % 0.1 %      Basophil % 0.4 %      Immature Grans % 2.9 %      Neutrophils, Absolute 7.62 10*3/mm3      Lymphocytes, Absolute 1.06 10*3/mm3      Monocytes, Absolute 0.59 10*3/mm3      Eosinophils, Absolute 0.01 10*3/mm3      Basophils, Absolute 0.04 10*3/mm3      Immature Grans, Absolute 0.28 10*3/mm3      nRBC 0.0 /100 WBC     Procalcitonin [[id number]]  (Normal) Collected: 10/16/21 0042    Specimen: Blood Updated: 10/16/21 0119     Procalcitonin 0.14 ng/mL     Narrative:      As a Marker for Sepsis (Non-Neonates):     1. <0.5 ng/mL represents a low risk of severe sepsis and/or septic shock.  2. >2 ng/mL represents a high risk of severe sepsis and/or septic shock.    As a Marker for Lower Respiratory Tract Infections that require antibiotic therapy:  PCT on Admission     Antibiotic Therapy             6-12 Hrs later  >0.5                          Strongly Recommended            >0.25 - <0.5             Recommended  0.1 - 0.25                  Discouraged                       Remeasure/reassess PCT  <0.1                         Strongly Discouraged         Remeasure/reassess PCT      As 28 day mortality risk marker: \"Change in Procalcitonin Result\" (>80% or <=80%) if Day 0 (or Day 1) and Day 4 values are available. Refer to http://www.[employer]-pct-calculator.com/    Change in PCT <=80 %   A decrease of PCT levels below or equal to 80% defines a positive change in PCT test result representing a higher risk for 28-day all-cause mortality of patients diagnosed with severe sepsis or septic shock.    Change in PCT >80 %   A decrease of PCT levels of more than 80% defines a negative change in PCT result representing a lower risk for 28-day all-cause mortality of patients diagnosed with severe sepsis or septic shock.                Ferritin [[id number]]  (Abnormal) Collected: 10/16/21 0042    Specimen: Blood Updated: 10/16/21 0117     Ferritin 1,641.00 ng/mL     Narrative:      Results may be falsely " decreased if patient taking Biotin.      POC Glucose Once [[id number]]  (Abnormal) Collected: 10/15/21 2253    Specimen: Blood Updated: 10/15/21 2304     Glucose 366 mg/dL      Comment: : [id number] Donovan BellolasMeter ID: [id number]       POC Glucose Once [[id number]]  (Abnormal) Collected: 10/15/21 2026    Specimen: Blood Updated: 10/15/21 2038     Glucose 356 mg/dL      Comment: : [id number] Donovan BellolasMeter ID: [id number]       Blood Culture - Blood, Arm, Left [[id number]]  (Normal) Collected: 10/12/21 1830    Specimen: Blood from Arm, Left Updated: 10/15/21 1900     Blood Culture No growth at 3 days    Blood Culture - Blood, Arm, Left [[id number]]  (Normal) Collected: 10/12/21 1755    Specimen: Blood from Arm, Left Updated: 10/15/21 1900     Blood Culture No growth at 3 days    POC Glucose Once [[id number]]  (Abnormal) Collected: 10/15/21 1715    Specimen: Blood Updated: 10/15/21 1726     Glucose 499 mg/dL      Comment: : [id number] Demetrio MazariegosyMeter ID: [id number]       POC Glucose Once [[id number]]  (Abnormal) Collected: 10/15/21 1111    Specimen: Blood Updated: 10/15/21 1122     Glucose 334 mg/dL      Comment: : JASMINE Cuellareter ID: [id number]              Cultures:  Blood Culture   Date Value Ref Range Status   10/12/2021 No growth at 3 days  Preliminary   10/12/2021 No growth at 3 days  Preliminary       Radiology Data:    Imaging Results (Last 24 Hours)     ** No results found for the last 24 hours. **          No Known Allergies    Scheduled meds:   albuterol sulfate HFA, 2 puff, Inhalation, 4x Daily - RT  artificial tears, , Both Eyes, Q4H  ascorbic acid, 500 mg, Oral, Daily  atorvastatin, 10 mg, Oral, Nightly  cholecalciferol, 2,000 Units, Oral, Daily  dexamethasone, 6 mg, Oral, Daily With Breakfast  enoxaparin, 40 mg, Subcutaneous, Q12H  famotidine, 20 mg, Oral, BID AC  guaiFENesin, 1,200 mg, Oral, BID  insulin detemir, 15 Units, Subcutaneous, Q12H  insulin lispro,  0-14 Units, Subcutaneous, TID AC  linagliptin, 5 mg, Oral, Daily  sodium chloride, 10 mL, Intravenous, Q12H  zinc sulfate, 220 mg, Oral, Daily        PRN meds:  •  acetaminophen **OR** acetaminophen  •  artificial tears  •  benzonatate  •  dextromethorphan polistirex ER  •  dextrose  •  dextrose  •  glucagon (human recombinant)  •  ondansetron  •  sodium chloride    Assessment/Plan       Pneumonia due to COVID-19 virus    Acute respiratory failure with hypoxia (HCC)      Plan:  Covid pneumonia/acute respiratory failure hypoxia.  Patient has not been vaccinated.  Continue Decadron.  Consult pulmonary.  Consult infectious disease.  Albuterol inhaler.  Tessalon Perles as needed.  Zinc.  Vitamin D.  Vitamin C.  Decadron.  Lovenox prophylaxis.  Lipitor. Status post Tocilizumab 10/13/2021. Delsym as needed. Mucinex.  Chest x-ray-Bilateral groundglass parenchymal infiltrates worrisome for Covid 19 infection.  CTA of the chest-No gross evidence of pulmonary thromboembolic disease, thoracic aorta and proximal great vessels are unremarkable, extensive mixed groundglass and alveolar consolidation within both lungs consistent with rather severe Covid pneumonia.  Echocardiogram-ejection fraction 61 to 65%.  Currently on Vapotherm 40/100.     Reflux.  Pepcid.  Zofran as needed.      Acute kidney injury.  Creatinine is improving.  Cut back IV hydration     Hyponatremia.   Resolved.     Diabetes/hyperglycemia.    Moderate to high sliding scale.   Increase Levemir.  Hemoglobin A1c 8.4.   Continue Tradjenta.     Morbid obesity.  BMI is 47.     Nutrition.  Regular/consistent carb diet.     Blood culture pending.  Covid-19-positive     Discharge Planning:  3 to 6 days. Guarded.    Electronically signed by Dustin Zuniga MD, 10/16/21, 10:32 AM CDT.

## 2021-10-17 NOTE — PROGRESS NOTES
[facility] Medicine Services  INPATIENT PROGRESS NOTE    Length of Stay: 5  Date of Admission: 10/12/2021  Primary Care Physician: Froylan Littlejohn DO    Subjective   Chief Complaint: Covid pneumonia/respiratory failure/morbid obesity.    HPI   Patient still requiring maxing out Vapotherm.  However inflammation markers improving.  Patient having chest pain.  T-max 99.2.  T-current 98.3.  Blood pressure stable.    Review of Systems   Constitutional: Positive for activity change, appetite change and fatigue. Negative for chills and fever.   HENT: Negative for hearing loss, nosebleeds, tinnitus and trouble swallowing.    Eyes: Negative for visual disturbance.   Respiratory: Positive for cough and shortness of breath. Negative for chest tightness and wheezing.    Cardiovascular: Negative for chest pain, palpitations and leg swelling.   Gastrointestinal: Negative for abdominal distention, abdominal pain, blood in stool, constipation, diarrhea, nausea and vomiting.   Endocrine: Negative for cold intolerance, heat intolerance, polydipsia, polyphagia and polyuria.   Genitourinary: Negative for decreased urine volume, difficulty urinating, dysuria, flank pain, frequency and hematuria.   Musculoskeletal: Negative for arthralgias, joint swelling and myalgias.   Skin: Negative for rash.   Allergic/Immunologic: Negative for immunocompromised state.   Neurological: Negative for dizziness, syncope, weakness, light-headedness and headaches.   Hematological: Negative for adenopathy. Does not bruise/bleed easily.   Psychiatric/Behavioral: Negative for confusion and sleep disturbance. The patient is not nervous/anxious.       All pertinent negatives and positives are as above. All other systems have been reviewed and are negative unless otherwise stated.     Objective    Temp:  [98.3 °F (36.8 °C)-99.2 °F (37.3 °C)] 98.3 °F (36.8 °C)  Heart Rate:  [63-98] 76  Resp:  [20-31] 24  BP:  ()/(68-94) 128/72    Intake/Output Summary (Last 24 hours) at 10/17/2021 1143  Last data filed at 10/17/2021 0755  Gross per 24 hour   Intake 800 ml   Output 1300 ml   Net -500 ml     Physical Exam  Vitals and nursing note reviewed.   Constitutional:       Appearance: He is well-developed.   HENT:      Head: Normocephalic.   Eyes:      Conjunctiva/sclera: Conjunctivae normal.      Pupils: Pupils are equal, round, and reactive to light.   Neck:      Vascular: No JVD.   Cardiovascular:      Rate and Rhythm: Normal rate and regular rhythm.      Heart sounds: Normal heart sounds. No murmur heard.  No friction rub. No gallop.    Pulmonary:      Effort: No respiratory distress.      Breath sounds: No wheezing or rales.      Comments: On Vapotherm.  Decrease in breath sound bilateral, clear.  Chest:      Chest wall: No tenderness.   Abdominal:      General: Bowel sounds are normal. There is no distension.      Palpations: Abdomen is soft.      Tenderness: There is no abdominal tenderness. There is no guarding or rebound.      Comments: Morbid obesity.  BMI is 47.   Musculoskeletal:         General: No tenderness or deformity. Normal range of motion.      Cervical back: Neck supple.   Skin:     General: Skin is warm and dry.      Capillary Refill: Capillary refill takes 2 to 3 seconds.      Findings: No rash.   Neurological:      Mental Status: He is alert and oriented to person, place, and time.      Cranial Nerves: No cranial nerve deficit.      Motor: Weakness present. No abnormal muscle tone.      Coordination: Coordination abnormal.      Gait: Gait abnormal.      Deep Tendon Reflexes: Reflexes normal.   Psychiatric:         Behavior: Behavior normal.         Thought Content: Thought content normal.   Results Review:  Lab Results (last 24 hours)     Procedure Component Value Units Date/Time    POC Glucose Once [[id number]]  (Abnormal) Collected: 10/17/21 0746    Specimen: Blood Updated: 10/17/21 0758     Glucose  220 mg/dL      Comment: : [id number] Kirti Lawrence ID: [id number]       Manual Differential [[id number]]  (Abnormal) Collected: 10/17/21 0426    Specimen: Blood Updated: 10/17/21 0525     Neutrophil % 84.0 %      Lymphocyte % 3.0 %      Monocyte % 6.0 %      Eosinophil % 1.0 %      Metamyelocyte % 2.0 %      Atypical Lymphocyte % 4.0 %      Neutrophils Absolute 9.08 10*3/mm3      Lymphocytes Absolute 0.76 10*3/mm3      Monocytes Absolute 0.65 10*3/mm3      Eosinophils Absolute 0.11 10*3/mm3      Anisocytosis Slight/1+     Polychromasia Slight/1+     WBC Morphology Normal     Platelet Morphology Normal    CBC & Differential [[id number]]  (Abnormal) Collected: 10/17/21 0426    Specimen: Blood Updated: 10/17/21 0525    Narrative:      The following orders were created for panel order CBC & Differential.  Procedure                               Abnormality         Status                     ---------                               -----------         ------                     CBC Auto Differential[[id number]]        Abnormal            Final result                 Please view results for these tests on the individual orders.    CBC Auto Differential [[id number]]  (Abnormal) Collected: 10/17/21 0426    Specimen: Blood Updated: 10/17/21 0525     WBC 10.81 10*3/mm3      RBC 5.54 10*6/mm3      Hemoglobin 15.0 g/dL      Hematocrit 45.6 %      MCV 82.3 fL      MCH 27.1 pg      MCHC 32.9 g/dL      RDW 13.8 %      RDW-SD 41.3 fl      MPV 10.4 fL      Platelets 261 10*3/mm3     Narrative:      The previously reported component NRBC is no longer being reported. Previous result was 0.0 /100 WBC (Reference Range: 0.0-0.2 /100 WBC) on 10/17/2021 at [facility].    Comprehensive Metabolic Panel [[id number]]  (Abnormal) Collected: 10/17/21 0426    Specimen: Blood Updated: 10/17/21 0515     Glucose 219 mg/dL      BUN 28 mg/dL      Creatinine 1.03 mg/dL      Sodium 137 mmol/L      Potassium 3.8 mmol/L      Chloride 103 mmol/L       CO2 23.0 mmol/L      Calcium 8.6 mg/dL      Total Protein 6.4 g/dL      Albumin 3.10 g/dL      ALT (SGPT) 28 U/L      AST (SGOT) 37 U/L      Alkaline Phosphatase 88 U/L      Total Bilirubin 0.6 mg/dL      eGFR Non African Amer 80 mL/min/1.73      Globulin 3.3 gm/dL      A/G Ratio 0.9 g/dL      BUN/Creatinine Ratio 27.2     Anion Gap 11.0 mmol/L     Narrative:      GFR Normal >60  Chronic Kidney Disease <60  Kidney Failure <15      C-reactive Protein [[id number]]  (Abnormal) Collected: 10/17/21 0426    Specimen: Blood Updated: 10/17/21 0515     C-Reactive Protein 0.52 mg/dL     POC Glucose Once [[id number]]  (Abnormal) Collected: 10/16/21 2140    Specimen: Blood Updated: 10/16/21 2151     Glucose 274 mg/dL      Comment: : [id number] Demetrio SocrativetanyMeter ID: [id number]       POC Glucose Once [[id number]]  (Abnormal) Collected: 10/16/21 2026    Specimen: Blood Updated: 10/16/21 2041     Glucose 288 mg/dL      Comment: : [id number] Demetrio SocrativetanyMeter ID: [id number]       Blood Culture - Blood, Arm, Left [[id number]]  (Normal) Collected: 10/12/21 1830    Specimen: Blood from Arm, Left Updated: 10/16/21 1900     Blood Culture No growth at 4 days    Blood Culture - Blood, Arm, Left [[id number]]  (Normal) Collected: 10/12/21 1755    Specimen: Blood from Arm, Left Updated: 10/16/21 1900     Blood Culture No growth at 4 days    POC Glucose Once [[id number]]  (Abnormal) Collected: 10/16/21 1643    Specimen: Blood Updated: 10/16/21 1657     Glucose 290 mg/dL      Comment: : [id number] Eris AllysonMeter ID: [id number]       POC Glucose Once [[id number]]  (Abnormal) Collected: 10/16/21 1142    Specimen: Blood Updated: 10/16/21 1203     Glucose 256 mg/dL      Comment: : [id number] Schulte AllysonMeter ID: [id number]              Cultures:  Blood Culture   Date Value Ref Range Status   10/12/2021 No growth at 4 days  Preliminary   10/12/2021 No growth at 4 days  Preliminary       Radiology Data:    Imaging Results (Last  24 Hours)     ** No results found for the last 24 hours. **          No Known Allergies    Scheduled meds:   albuterol sulfate HFA, 2 puff, Inhalation, 4x Daily - RT  ascorbic acid, 500 mg, Oral, Daily  atorvastatin, 20 mg, Oral, Nightly  cholecalciferol, 2,000 Units, Oral, Daily  dexamethasone, 6 mg, Oral, Daily With Breakfast  enoxaparin, 40 mg, Subcutaneous, Q12H  famotidine, 20 mg, Oral, BID AC  guaiFENesin, 1,200 mg, Oral, BID  insulin detemir, 20 Units, Subcutaneous, Q12H  insulin lispro, 0-24 Units, Subcutaneous, TID AC  linagliptin, 5 mg, Oral, Daily  sodium chloride, 10 mL, Intravenous, Q12H  zinc sulfate, 220 mg, Oral, Daily        PRN meds:  •  acetaminophen **OR** acetaminophen  •  artificial tears  •  benzonatate  •  dextromethorphan polistirex ER  •  dextrose  •  dextrose  •  glucagon (human recombinant)  •  ondansetron  •  sodium chloride  •  sodium chloride    Assessment/Plan       Pneumonia due to COVID-19 virus    Acute respiratory failure with hypoxia (HCC)      Plan:  Covid pneumonia/acute respiratory failure hypoxia.  Patient has not been vaccinated.  Continue Decadron.  Consult pulmonary.  Consult infectious disease.  Albuterol inhaler.  Tessalon Perles as needed.  Zinc.  Vitamin D.  Vitamin C.  Decadron.  Lovenox prophylaxis.  Lipitor. Status post Tocilizumab 10/13/2021. Delsym as needed. Mucinex.  Chest x-ray-Bilateral groundglass parenchymal infiltrates worrisome for Covid 19 infection.  CTA of the chest-No gross evidence of pulmonary thromboembolic disease, thoracic aorta and proximal great vessels are unremarkable, extensive mixed groundglass and alveolar consolidation within both lungs consistent with rather severe Covid pneumonia.  Echocardiogram-ejection fraction 61 to 65%.  Currently on Vapotherm 40/100.     Reflux.  Pepcid.  Zofran as needed.      Acute kidney injury.  Creatinine is improving.  Cut back IV hydration     Hyponatremia.   Resolved.     Diabetes/hyperglycemia.    Moderate  to high sliding scale.   Increase Levemir.  Hemoglobin A1c 8.4.   Continue Tradjenta.     Morbid obesity.  BMI is 47.      Nutrition.  Regular/consistent carb diet.     Blood culture pending.  Covid-19-positive     Discharge Planning:  3 to 6 days. Guarded.    Electronically signed by Dustin Zuniga MD, 10/17/21, 11:43 AM CDT.

## 2021-10-17 NOTE — PROGRESS NOTES
He is    PULMONARY AND CRITICAL CARE PROGRESS NOTE -     Patient: Abebe Esquivel  1980   MR# [id number]   Acct# [id number]  10/17/21   13:22 CDT  Referring Provider: Dustin Zuniga MD    Chief Complaint: Covid-19 pneumonia, acute hypoxic respiratory failure    Interval history: Patient is a obese young  male was seen in the pulmonary rounds in intensive care unit today.  He was seen from outside the room through the glass door to reduce the risk of cross infection exposure and to minimize use of PPE due to COVID-19 positive status.  He is doing well overall and used BiPAP last night.  He still remains on Vapotherm.  This morning he is on Vapotherm 40 L flow with 95% FiO2 and oxygen saturation is 94%.  He is feeling overall better and doing some incentive spirometry and flutter valve.  He did not have any fever and did not have any acute events overnight.    Meds:  albuterol sulfate HFA, 2 puff, Inhalation, 4x Daily - RT  ascorbic acid, 500 mg, Oral, Daily  atorvastatin, 20 mg, Oral, Nightly  cholecalciferol, 2,000 Units, Oral, Daily  dexamethasone, 6 mg, Oral, Daily With Breakfast  enoxaparin, 40 mg, Subcutaneous, Q12H  famotidine, 20 mg, Oral, BID AC  guaiFENesin, 1,200 mg, Oral, BID  insulin detemir, 20 Units, Subcutaneous, Q12H  insulin lispro, 0-24 Units, Subcutaneous, TID AC  linagliptin, 5 mg, Oral, Daily  sodium chloride, 10 mL, Intravenous, Q12H  zinc sulfate, 220 mg, Oral, Daily         Review of Systems:   Review of Systems   Constitutional: Negative for fever.   Respiratory: Positive for cough and shortness of breath.    Cardiovascular: Negative for chest pain.   Gastrointestinal: Positive for diarrhea. Negative for nausea and vomiting.   :    Physical Exam:  SpO2 Percentage    10/17/21 1000 10/17/21 1100 10/17/21 1200   SpO2: 90% 93% 93%     Temp:  [98.3 °F (36.8 °C)-99.2 °F (37.3 °C)] 99.2 °F (37.3 °C)  Heart Rate:  [63-98] 71  Resp:  [20-31] 25  BP:  ()/(68-94) 131/80    Intake/Output Summary (Last 24 hours) at 10/17/2021 1322  Last data filed at 10/17/2021 1234  Gross per 24 hour   Intake 1600 ml   Output 1300 ml   Net 300 ml     GENERAL/CONSTITUTIONAL: No acute distress, awake and alert   HEENT: atraumatic, normocephalic  NOSE: Vapotherm in place  NECK: jugular veins nondistended, thick  CHEST: no paradox, no retractions.  No respiratory distress.   CARDIAC: Sinus rhythm, rate 61  ABDOMEN: non-distended, obese  : Defer  EXTREMITIES: Not visible  NEURO: Awake, alert and oriented  SKIN: no jaundice.  No rash    Laboratory Data:  Results from last 7 days   Lab Units 10/17/21  0426 10/16/21  0511 10/15/21  0323   WBC 10*3/mm3 10.81* 9.60 10.59   HEMOGLOBIN g/dL 15.0 15.2 15.2   PLATELETS 10*3/mm3 261 284 322     Results from last 7 days   Lab Units 10/17/21  0426 10/16/21  0511 10/16/21  0042 10/15/21  0323 10/15/21  0323 10/14/21  0307 10/14/21  0307 10/13/21  0321 10/12/21  1755   SODIUM mmol/L 137 136  --   --  132*   < > 129*   < > 130*   POTASSIUM mmol/L 3.8 3.6  --   --  3.8   < > 4.0   < > 3.6   BUN mg/dL 28* 32*  --   --  34*   < > 26*   < > 21*   CREATININE mg/dL 1.03 1.10  --   --  1.14   < > 1.15   < > 1.31*   CRP mg/dL 0.52* 0.93*  --    < > 2.16*   < > 5.40*   < >  --    PROCALCITONIN ng/mL  --   --  0.14  --   --   --  0.26*  --  0.33*   FERRITIN ng/mL  --   --  1,641.00*  --   --   --  1,502.00*   < > 1,328.00*   D DIMER QUANT mg/L (FEU)  --   --   --   --   --   --   --   --  1.35*    < > = values in this interval not displayed.     Results from last 7 days   Lab Units 10/12/21  1755   PH, ARTERIAL pH units 7.498*   PCO2, ARTERIAL mm Hg 30.1*   PO2 ART mm Hg 45.8*     Blood Culture   Date Value Ref Range Status   10/12/2021 No growth at 24 hours  Preliminary   10/12/2021 No growth at 24 hours  Preliminary     Recent films:  No radiology results for the last day  Films reviewed personally by me.  My interpretation: None today,  10-15-21    Pulmonary Assessment:    1. Covid-19, received tociluzimab  2. Acute hypoxemic respiratory failure secondary to Covid  3. Obesity  4. Unvaccinated status, did not receive Regeneron    Recommend:     · Continue supplemental oxygen and titrate as tolerated for goal around 92% oxygen saturation or above.  · Patient did not use BiPAP last night and is currently on Vapotherm..  He still requiring 40 L flow and 100% FiO2.  · We will try to use BiPAP as needed, 16/8 FiO2 0.8.  He is encouraged to use BiPAP at night he may have underlying sleep apnea as well and may need further work-up as an outpatient when is more stable.  · Continue enhanced isolation for COVID-19.  · Encourage incentive spirometry and flutter valve to improve pulmonary compliance and clearance  · DVT prophylaxis, Lovenox 40 mg twice daily  · Mobilize as tolerated  · Proning candidate: Yes. Self proning will be encouraged.  · Dexamethasone day 4 of 10, daily   · Encourage nutrition and mobilization  · Stress ulcer prophylaxis, Pepcid   · Continue bronchodilators, Albuterol  · Continue guaifenesin.  Pulmonary toilet.  · Repeat labs and imaging studies and monitor inflammatory markers.  · Enhanced isolation for COVID-19 positive status.  · CODE STATUS: Full.  Overall prognosis: Guarded  · Pulmonary team will continue following and make further recommendations.    Electronically signed by     Thompson Wiley MD  Pulmonologist/Intensivist  10/17/2021 13:22 CDT

## 2021-10-18 PROBLEM — E11.65 TYPE 2 DIABETES MELLITUS WITH HYPERGLYCEMIA, WITHOUT LONG-TERM CURRENT USE OF INSULIN (HCC): Status: ACTIVE | Noted: 2021-01-01

## 2021-10-18 PROBLEM — J80 ACUTE RESPIRATORY DISTRESS SYNDROME (ARDS) DUE TO 2019 NOVEL CORONAVIRUS (HCC): Status: ACTIVE | Noted: 2021-01-01

## 2021-10-18 PROBLEM — E66.01 OBESITY, CLASS III, BMI 40-49.9 (MORBID OBESITY) (HCC): Status: ACTIVE | Noted: 2021-01-01

## 2021-10-18 PROBLEM — U07.1 ACUTE RESPIRATORY DISTRESS SYNDROME (ARDS) DUE TO 2019 NOVEL CORONAVIRUS (HCC): Status: ACTIVE | Noted: 2021-01-01

## 2021-10-18 PROBLEM — E87.1 HYPONATREMIA: Status: ACTIVE | Noted: 2021-01-01

## 2021-10-18 NOTE — PROCEDURES
"Intubation    Date/Time: 10/18/2021 10:33 AM  Performed by: Malik Navarro DO  Authorized by: Malik Navarro DO   Consent: Verbal consent obtained. Written consent not obtained.  Risks and benefits: risks, benefits and alternatives were discussed  Consent given by: patient  Patient understanding: patient states understanding of the procedure being performed  Patient consent: the patient's understanding of the procedure matches consent given  Required items: required blood products, implants, devices, and special equipment available  Patient identity confirmed: arm band and verbally with patient  Time out: Immediately prior to procedure a \"time out\" was called to verify the correct patient, procedure, equipment, support staff and site/side marked as required.  Indications: respiratory distress,  respiratory failure and  hypoxemia  Intubation method: video-assisted  Pretreatment medications: midazolam  Sedatives: etomidate  Paralytic: rocuronium  Laryngoscope size: Mac 3  Tube size: 7.5 mm  Tube type: cuffed  Number of attempts: 1  Ventilation between attempts: BiPAP.  Cricoid pressure: no  Cords visualized: yes  Post-procedure assessment: chest rise and ETCO2 monitor  Breath sounds: equal  Cuff inflated: yes  ETT to lip: 23 cm  Tube secured with: ETT mendez  Chest x-ray interpreted by: Pending.  Patient tolerance: patient tolerated the procedure well with no immediate complications      The patient consented to intubation and actually wanted to be intubated secondary to his extreme fatigue. We offered to FaceTime his family and he declined.     Electronically signed by Malik Navarro DO, 10/18/21, 10:34 AM CDT.    "

## 2021-10-18 NOTE — PROGRESS NOTES
[facility] Medicine Services  INPATIENT PROGRESS NOTE    Patient Name: Abebe Esquivel  Date of Admission: 10/12/2021  Today's Date: 10/18/21  Length of Stay: 6  Primary Care Physician: Froylan Littlejohn DO    Subjective   Chief Complaint: Shortness of breath.   HPI   Inflammatory markers continue to improve after receiving Tocilizumab on 10/13.  Unfortunately, his oxygen requirements have not been making any improvements.  He has been on BiPAP since early last night and cannot come off of it without saturations falling into the 70s.  He is tired.  Pulmonology is recommending elective intubation.  He consents to this.  He tells me that he cannot go on breathing like this.  He is currently on 16/8 with 80% by BiPAP.  His mother has been made aware.    He is without fever.    Review of Systems   All pertinent negatives and positives are as above. All other systems have been reviewed and are negative unless otherwise stated.     Objective    Temp:  [98.4 °F (36.9 °C)-99.4 °F (37.4 °C)] 98.4 °F (36.9 °C)  Heart Rate:  [] 94  Resp:  [20-32] 27  BP: (117-154)/(72-98) 154/98  Physical Exam  Constitutional:       Appearance: He is well-developed. He is obese.      Comments: Up in the chair, not tolerating BiPAP very well.  Discussed with his nurse, Renetta.    HENT:      Head: Normocephalic and atraumatic.   Eyes:      Conjunctiva/sclera: Conjunctivae normal.      Pupils: Pupils are equal, round, and reactive to light.   Neck:      Vascular: No JVD.   Cardiovascular:      Rate and Rhythm: Normal rate and regular rhythm.      Heart sounds: Normal heart sounds. No murmur heard.  No friction rub. No gallop.    Pulmonary:      Comments: Diminished breath sounds throughout.  Tachypneic.  Abdominal:      General: Bowel sounds are normal. There is no distension.      Palpations: Abdomen is soft.      Tenderness: There is no abdominal tenderness. There is no guarding or rebound.    Musculoskeletal:         General: No tenderness or deformity. Normal range of motion.      Cervical back: Neck supple.   Skin:     General: Skin is warm and dry.      Findings: No rash.   Neurological:      Mental Status: He is alert and oriented to person, place, and time.      Cranial Nerves: No cranial nerve deficit.      Motor: No abnormal muscle tone.      Deep Tendon Reflexes: Reflexes normal.   Psychiatric:      Comments: Flat, tired.       Results Review:  I have reviewed the labs, radiology results, and diagnostic studies.    Laboratory Data:   Results from last 7 days   Lab Units 10/18/21  0419 10/17/21  0426 10/16/21  0511   WBC 10*3/mm3 13.32* 10.81* 9.60   HEMOGLOBIN g/dL 15.9 15.0 15.2   HEMATOCRIT % 48.4 45.6 44.2   PLATELETS 10*3/mm3 204 261 284     Results from last 7 days   Lab Units 10/18/21  0419 10/17/21  0426 10/16/21  0511   SODIUM mmol/L 136 137 136   POTASSIUM mmol/L 4.9 3.8 3.6   CHLORIDE mmol/L 100 103 103   CO2 mmol/L 25.0 23.0 23.0   BUN mg/dL 24* 28* 32*   CREATININE mg/dL 1.10 1.03 1.10   CALCIUM mg/dL 8.9 8.6 8.6   BILIRUBIN mg/dL 0.7 0.6 0.5   ALK PHOS U/L 100 88 73   ALT (SGPT) U/L 37 28 19   AST (SGOT) U/L 44* 37 25   GLUCOSE mg/dL 226* 219* 300*     COVID LABS:  Results From Last 14 Days   Lab Units 10/18/21  0419 10/17/21  2354 10/17/21  0426 10/16/21  0511 10/16/21  0042 10/15/21  0323 10/14/21  0307 10/13/21  0321 10/13/21  0321 10/12/21  1755 10/12/21  1755   CRP mg/dL 0.39  --  0.52* 0.93*  --    < > 5.40*   < > 9.37*  --   --    D DIMER QUANT mg/L (FEU)  --   --   --   --   --   --   --   --   --   --  1.35*   FERRITIN ng/mL  --  1,582.00*  --   --  1,641.00*  --  1,502.00*   < > 1,243.00*   < > 1,328.00*   LACTATE mmol/L  --   --   --   --   --   --   --   --   --   --  1.7   LDH U/L  --   --   --   --   --   --   --   --   --   --  694*   PROCALCITONIN ng/mL  --  0.14  --   --  0.14  --  0.26*  --   --    < > 0.33*   TROPONIN T ng/mL  --   --   --   --   --   --   --    --  <0.010  --  <0.010   TRIGLYCERIDES mg/dL  --   --   --   --   --   --  270*  --   --   --   --     < > = values in this interval not displayed.     Radiology Data:   Imaging Results (Last 24 Hours)     Procedure Component Value Units Date/Time    XR Chest 1 View [[id number]] Collected: 10/18/21 0730     Updated: 10/18/21 0733    Narrative:      EXAMINATION: XR CHEST 1 VW-  10/18/2021 7:30 AM CDT 1 view     HISTORY: Respiratory Decline; J96.01-Acute respiratory failure with  hypoxia; U07.1-COVID-19     COMPARISON: 10/12/2021.     FINDINGS:   Consolidative change in the RIGHT mid to lower lung is slightly worsened  since the previous study. Patchy consolidations in the LEFT mid and  lower lung are also slightly worse since the prior study. The heart is  mildly enlarged.      The osseous structures and surrounding soft tissues demonstrate no acute  abnormality.          Impression:         1.  Slight interval worsening of consolidative changes in the lungs.        This report was finalized on 10/18/2021 07:30 by Dr. Feliz Magdaleno MD.        I have reviewed the patient's current medications.     Assessment/Plan     Active Hospital Problems    Diagnosis    • **Pneumonia due to COVID-19 virus    • Acute respiratory failure with hypoxia (HCC)    • Type 2 diabetes mellitus with hyperglycemia, without long-term current use of insulin (HCC)    • Obesity, Class III, BMI 40-49.9 (morbid obesity) (HCC)    • Hyponatremia      Plan:  The patient was admitted on 10/12 by Dr. Harden.  He presented to the emergency department with complaints of shortness of breath, cough, and fever.  He started symptoms of COVID-19 around 10/4 and tested positive on 10/5.  He is unvaccinated.  He required 5 L of oxygen to maintain saturations greater than 90% at presentation.    He was felt to be out of the window for benefit from remdesivir.  He was administered Tocilizumab on 10/13.  He continues to receive Decadron.  He continues to receive  adjunctive treatments.    He has continued to worsen from a respiratory standpoint throughout the course of his stay here.  He was ultimately placed on Vapotherm and has now been on BiPAP overnight.  Dr. Gonzalez with pulmonology is following.  He feels that the patient should be electively intubated today.    Albuterol HFA, Mucinex, incentive spirometry.    CRP has normalized and ferritin is stable.    He was not previously known to have diabetes.  His hemoglobin A1c is 8.4.  Continue basal insulin. Continue Accu-Cheks and sliding scale insulin.    Pepcid for peptic ulcer prophylaxis.    Lovenox for DVT prophylaxis.    I spoke to his mother, Aicha Esquivel, at [phone].  She was updated as to his status.  She is very distraught. She is not happy with the fact that he may need to go on ventilatory support.  She tells me that his children are 12 and 11 years old.  The patient is .  She is his decision-maker.    Electronically signed by Malik Navarro DO, 10/18/21, 08:22 CDT.    Approximately 35 minutes of critical care time were spent managing the patient exclusive of billable procedures.

## 2021-10-18 NOTE — PROCEDURES
"Insert Central Line At Bedside    Date/Time: 10/18/2021 10:36 AM  Performed by: Malik Navarro DO  Authorized by: Malik Navarro DO   Consent: Verbal consent obtained.  Risks and benefits: risks, benefits and alternatives were discussed  Consent given by: patient  Patient understanding: patient states understanding of the procedure being performed  Patient consent: the patient's understanding of the procedure matches consent given  Required items: required blood products, implants, devices, and special equipment available  Patient identity confirmed: arm band  Time out: Immediately prior to procedure a \"time out\" was called to verify the correct patient, procedure, equipment, support staff and site/side marked as required.  Indications: vascular access and central pressure monitoring    Sedation:  Patient sedated: yes    Preparation: skin prepped with ChloraPrep  Skin prep agent dried: skin prep agent completely dried prior to procedure  Sterile barriers: all five maximum sterile barriers used - cap, mask, sterile gown, sterile gloves, and large sterile sheet  Hand hygiene: hand hygiene performed prior to central venous catheter insertion  Location details: right internal jugular  Patient position: flat  Catheter size: 7 Fr  Pre-procedure: landmarks identified  Ultrasound guidance: yes  Sterile ultrasound techniques: sterile gel and sterile probe covers were used  Number of attempts: 1  Successful placement: yes  Post-procedure: line sutured and dressing applied  Assessment: blood return through all ports and free fluid flow  Patient tolerance: patient tolerated the procedure well with no immediate complications      CXR pending.     Electronically signed by Malik Navarro DO, 10/18/21, 10:37 AM CDT.    "

## 2021-10-18 NOTE — PROGRESS NOTES
He is    PULMONARY AND CRITICAL CARE PROGRESS NOTE - [facility]    Patient: Abebe Esquivel  1980   MR# [id number]   Acct# [id number]  10/18/21   08:18 CDT  Referring Provider: Malik Navarro DO    Chief Complaint: Covid-19 pneumonia, acute hypoxic respiratory failure    Interval history: The patient was seen from outside the glass to preserve PPE and limit exposure.  Patient is seen resting up in the chair.  Oxygen saturation 97% on BiPAP 16/8 FiO2 1.0.  Respiratory rate 40s.  Per nursing when he takes BiPAP mask off his saturations dropping to the 60s.  He is unable to tolerate Vapotherm anymore.  Care team has discussed possible need for intubation near future with him.  C-RP has normalized.  Ferritin trending down.  He is afebrile.  No other issues reported.      Meds:  albuterol sulfate HFA, 2 puff, Inhalation, 4x Daily - RT  ascorbic acid, 500 mg, Oral, Daily  atorvastatin, 20 mg, Oral, Nightly  cholecalciferol, 2,000 Units, Oral, Daily  dexamethasone, 6 mg, Oral, Daily With Breakfast  enoxaparin, 40 mg, Subcutaneous, Q12H  famotidine, 20 mg, Oral, BID AC  guaiFENesin, 1,200 mg, Oral, BID  insulin detemir, 20 Units, Subcutaneous, Q12H  insulin lispro, 0-24 Units, Subcutaneous, TID AC  linagliptin, 5 mg, Oral, Daily  sodium chloride, 10 mL, Intravenous, Q12H  zinc sulfate, 220 mg, Oral, Daily         Review of Systems:   Review of Systems   Constitutional: Positive for fatigue. Negative for fever.   Respiratory: Positive for cough and shortness of breath.    Cardiovascular: Negative for chest pain.   Gastrointestinal: Negative for nausea and vomiting.   :    Physical Exam:  SpO2 Percentage    10/18/21 0600 10/18/21 0700 10/18/21 0720   SpO2: 94% 93% 90%     Temp:  [98.4 °F (36.9 °C)-99.4 °F (37.4 °C)] 98.4 °F (36.9 °C)  Heart Rate:  [] 94  Resp:  [20-32] 27  BP: (117-154)/(72-98) 154/98    Intake/Output Summary (Last 24 hours) at 10/18/2021 0818  Last data filed at 10/18/2021  0600  Gross per 24 hour   Intake 1390 ml   Output 1950 ml   Net -560 ml     GENERAL/CONSTITUTIONAL: No acute distress, awake and alert, BiPAP in place  HEENT: atraumatic, normocephalic  NOSE: Normal  NECK: jugular veins nondistended, thick  CHEST: no paradox, no retractions.  No respiratory distress. Tachypneic   CARDIAC: Sinus rhythm, rate 84  ABDOMEN: non-distended, obese  : Defer  EXTREMITIES: Not visible  NEURO: Awake, alert and oriented  SKIN: no jaundice.  No rash    Laboratory Data:  Results from last 7 days   Lab Units 10/18/21  0419 10/17/21  0426 10/16/21  0511   WBC 10*3/mm3 13.32* 10.81* 9.60   HEMOGLOBIN g/dL 15.9 15.0 15.2   PLATELETS 10*3/mm3 204 261 284     Results from last 7 days   Lab Units 10/18/21  0419 10/17/21  2354 10/17/21  0426 10/16/21  0511 10/16/21  0511 10/16/21  0042 10/15/21  0323 10/13/21  0321 10/12/21  1755   SODIUM mmol/L 136  --  137  --  136  --    < >   < > 130*   POTASSIUM mmol/L 4.9  --  3.8  --  3.6  --    < >   < > 3.6   BUN mg/dL 24*  --  28*  --  32*  --    < >   < > 21*   CREATININE mg/dL 1.10  --  1.03  --  1.10  --    < >   < > 1.31*   CRP mg/dL 0.39  --  0.52*   < > 0.93*  --    < >   < >  --    PROCALCITONIN ng/mL  --  0.14  --   --   --  0.14  --    < > 0.33*   FERRITIN ng/mL  --  1,582.00*  --   --   --  1,641.00*  --    < > 1,328.00*   D DIMER QUANT mg/L (FEU)  --   --   --   --   --   --   --   --  1.35*    < > = values in this interval not displayed.     Results from last 7 days   Lab Units 10/12/21  1755   PH, ARTERIAL pH units 7.498*   PCO2, ARTERIAL mm Hg 30.1*   PO2 ART mm Hg 45.8*     Blood Culture   Date Value Ref Range Status   10/12/2021 No growth at 24 hours  Preliminary   10/12/2021 No growth at 24 hours  Preliminary     Recent films:  XR Chest 1 View    Result Date: 10/18/2021   1.  Slight interval worsening of consolidative changes in the lungs.   This report was finalized on 10/18/2021 07:30 by Dr. Feliz Magdaleno MD.    Films reviewed personally  by me.  My interpretation: Bilateral bibasilar infiltrates 10-18-21    Pulmonary Assessment:    Covid-19, received tociluzimab  Acute hypoxemic respiratory failure secondary to Covid  Obesity  Unvaccinated status, did not receive Regeneron  S/p Tocilizumab  Leukocytosis    Recommend:     Continue supplemental oxygen and titrate as tolerated for goal around 92% oxygen saturation or above.  Currently on BiPAP 16/8 FiO2 1.0  Okay for Vapotherm as tolerated  Risk for intubation, discussed with nursing  Encourage incentive spirometry and flutter valve to improve pulmonary compliance and clearance  DVT prophylaxis, Lovenox 40 mg twice daily  Mobilize as tolerated  Proning candidate: Yes. Self proning will be encouraged.  Dexamethasone day 6 of 10, daily   Encourage nutrition and mobilization  Stress ulcer prophylaxis, Pepcid   Continue bronchodilators, Albuterol  Continue guaifenesin  Follow-up chest x-ray and ABG as indicated  CODE STATUS: Full.  Overall prognosis: Guarded    Electronically signed by     CESIA Manuel  10/18/2021 08:18 CDT    Physician substantive portion:  Patient has an end-tidal CO2 of 39.  Patient is on 40 L/min of 100% oxygen when not on BiPAP.  He sitting up in the chair.  He feels like he is doing fair.  He does not feel like he is dramatically worse but does not feel better.  He does feel fatigued.  Exam shows no distress.  He is conversant.  He is tachypneic.  He is on BiPAP.  Continue current level support, continue dexamethasone.  Proning.  High risk for needing the ventilator.  Inflammatory markers are trending down.  I do not think this is related to unfettered inflammatory response/cytokine release    I have seen and examined patient personally, performing a face-to-face diagnostic evaluation with plan of care reviewed and developed with APRN and nursing staff. I have addended and/or modified the above history of present illness, physical examination, and assessment and plan to  reflect my findings and impressions. Essential elements of the care plan were discussed with APRN above.  Agree with findings and assessment/plan as documented above.    Electronically signed by Raza Gonzalez MD, on 10/18/2021, 09:07 CDT

## 2021-10-18 NOTE — PROGRESS NOTES
"INFECTIOUS DISEASES PROGRESS NOTE    Patient:  Abebe Esquivel  YOB: 1980  MRN: [id number]   Admit date: 10/12/2021   Admitting Physician: Malik Navarro DO  Primary Care Physician: Froylan Littlejohn DO    Chief Complaint: COVID-19 infection        Interval History: Reviewed with MARINO Jackson.  Spoke with patient.  He noted he was \"worn out\".  He had conveyed to Dr. Navarro earlier that he wanted to be intubated.    Patient essentially would sit up in the chair or lie on his right side.  Apparently has not  proned or change positions to near prone or left side      Allergies: No Known Allergies    Current Meds:     Current Facility-Administered Medications:   •  acetaminophen (TYLENOL) tablet 650 mg, 650 mg, Oral, Q4H PRN **OR** acetaminophen (TYLENOL) suppository 650 mg, 650 mg, Rectal, Q4H PRN, Harris Harden DO  •  albuterol sulfate HFA (PROVENTIL HFA;VENTOLIN HFA;PROAIR HFA) inhaler 2 puff, 2 puff, Inhalation, 4x Daily - RT, Harris Harden DO, 2 puff at 10/18/21 0720  •  artificial tears ophthalmic ointment, , Both Eyes, PRN, Raza Gonzalez MD  •  ascorbic acid (VITAMIN C) tablet 500 mg, 500 mg, Oral, Daily, Harris Harden DO, 500 mg at 10/17/21 0800  •  atorvastatin (LIPITOR) tablet 20 mg, 20 mg, Oral, Nightly, Dustin Zuniga MD, 20 mg at 10/17/21 2143  •  benzonatate (TESSALON) capsule 200 mg, 200 mg, Oral, TID PRN, Harris Harden DO, 200 mg at 10/16/21 0108  •  cholecalciferol (VITAMIN D3) tablet 2,000 Units, 2,000 Units, Oral, Daily, Dustin Zuniga MD, 2,000 Units at 10/17/21 0800  •  dexamethasone (DECADRON) tablet 6 mg, 6 mg, Oral, Daily With Breakfast, Harris Harden DO, 6 mg at 10/17/21 0744  •  dextromethorphan polistirex ER (DELSYM) 30 MG/5ML oral suspension 60 mg, 60 mg, Oral, BID PRN, Dustin Zuniga MD, 60 mg at 10/17/21 1420  •  dextrose (D50W) (25 g/50 mL) IV injection 25 g, 25 g, Intravenous, Q15 Min PRN, Dustin Zuniga MD  •  dextrose " "(GLUTOSE) oral gel 15 g, 15 g, Oral, Q15 Min PRN, Dustin Zuniga MD  •  enoxaparin (LOVENOX) syringe 40 mg, 40 mg, Subcutaneous, Q12H, Harris Harden DO, 40 mg at 10/17/21 2141  •  famotidine (PEPCID) tablet 20 mg, 20 mg, Oral, BID AC, Harris Harden DO, 20 mg at 10/17/21 1857  •  glucagon (human recombinant) (GLUCAGEN DIAGNOSTIC) injection 1 mg, 1 mg, Subcutaneous, Q15 Min PRN, Dustin Zuniga MD  •  guaiFENesin (MUCINEX) 12 hr tablet 1,200 mg, 1,200 mg, Oral, BID, Lorene Pizarro APRN, 1,200 mg at 10/17/21 2143  •  insulin detemir (LEVEMIR) injection 20 Units, 20 Units, Subcutaneous, Q12H, Dustin Zuniga MD, 20 Units at 10/17/21 2144  •  insulin lispro (humaLOG) injection 0-24 Units, 0-24 Units, Subcutaneous, TID AC, Dustin Zuniga MD, 12 Units at 10/17/21 165  •  linagliptin (TRADJENTA) tablet 5 mg, 5 mg, Oral, Daily, Dustin Zuniga MD, 5 mg at 10/17/21 0801  •  ondansetron (ZOFRAN) injection 4 mg, 4 mg, Intravenous, Q6H PRN, Harris Harden DO  •  sennosides-docusate (PERICOLACE) 8.6-50 MG per tablet 2 tablet, 2 tablet, Oral, Nightly PRN, Dustin Zuniga MD  •  sodium chloride 0.9 % flush 10 mL, 10 mL, Intravenous, Q12H, Harris Harden DO, 10 mL at 10/17/21 2146  •  sodium chloride 0.9 % flush 10 mL, 10 mL, Intravenous, PRN, Harris Harden DO  •  sodium chloride nasal spray 1 spray, 1 spray, Each Nare, PRN, Dustin Zuniga MD, 1 spray at 10/17/21 1418  •  zinc sulfate (ZINCATE) capsule 220 mg, 220 mg, Oral, Daily, Harris Harden DO, 220 mg at 10/17/21 0801      Review of Systems   General: No fevers  Respiratory: Fatiguing  Objective     Vital Signs:  Temp (24hrs), Av °F (37.2 °C), Min:98.4 °F (36.9 °C), Max:99.4 °F (37.4 °C)      /98   Pulse 94   Temp 98.4 °F (36.9 °C)   Resp 27   Ht 182.9 cm (72.01\")   Wt (!) 155 kg (342 lb 13 oz)   SpO2 90%   BMI 46.48 kg/m²         Physical Exam  General: Patient sitting up in the recliner with BiPAP in place.  " Desaturating to the 70s when BiPAP comes off.  HEENT: BiPAP in place.  Cardiac: Rhythm regular rate in the 90s  Neuro: Alert, nods appropriately.  Psych: Pleasant, cooperative    Results Review:    I reviewed the patient's new clinical results.    Lab Results:  CBC:   Lab Results   Lab 10/12/21  1755 10/13/21  0321 10/14/21  0307 10/15/21  0323 10/16/21  0511 10/17/21  0426 10/18/21  0419   WBC 6.47 6.00 8.45 10.59 9.60 10.81* 13.32*   HEMOGLOBIN 15.0 14.6 15.4 15.2 15.2 15.0 15.9   HEMATOCRIT 44.5 43.2 44.9 45.9 44.2 45.6 48.4   PLATELETS 188 178 254 322 284 261 204   LYMPHOCYTE %  --   --   --   --   --  3.0* 6.1*   MONOCYTES %  --   --   --   --   --  6.0 7.1         CMP:   Lab Results   Lab 10/16/21  0511 10/17/21  0426 10/18/21  0419   SODIUM 136 137 136   POTASSIUM 3.6 3.8 4.9   CHLORIDE 103 103 100   CO2 23.0 23.0 25.0   BUN 32* 28* 24*   CREATININE 1.10 1.03 1.10   CALCIUM 8.6 8.6 8.9   BILIRUBIN 0.5 0.6 0.7   ALK PHOS 73 88 100   ALT (SGPT) 19 28 37   AST (SGOT) 25 37 44*   GLUCOSE 300* 219* 226*       COVID LABS:  Results From Last 14 Days   Lab Units 10/18/21  0419 10/17/21  2354 10/17/21  0426 10/16/21  0511 10/16/21  0042 10/15/21  0323 10/14/21  0307 10/13/21  0321 10/13/21  0321 10/12/21  1755 10/12/21  1755   CRP mg/dL 0.39  --  0.52* 0.93*  --    < > 5.40*   < > 9.37*  --   --    D DIMER QUANT mg/L (FEU)  --   --   --   --   --   --   --   --   --   --  1.35*   FERRITIN ng/mL  --  1,582.00*  --   --  1,641.00*  --  1,502.00*   < > 1,243.00*   < > 1,328.00*   LACTATE mmol/L  --   --   --   --   --   --   --   --   --   --  1.7   LDH U/L  --   --   --   --   --   --   --   --   --   --  694*   PROCALCITONIN ng/mL  --  0.14  --   --  0.14  --  0.26*  --   --    < > 0.33*   TROPONIN T ng/mL  --   --   --   --   --   --   --   --  <0.010  --  <0.010   TRIGLYCERIDES mg/dL  --   --   --   --   --   --  270*  --   --   --   --     < > = values in this interval not displayed.       Component   Ref Range &  Units 1 d ago   Hemoglobin A1C   4.80 - 5.60 % 8.40 High     Resulting Agency [facility]         Estimated Creatinine Clearance: 137.6 mL/min (by C-G formula based on SCr of 1.1 mg/dL).    Culture Results:    Microbiology Results (last 10 days)     Procedure Component Value - Date/Time    Blood Culture - Blood, Arm, Left [[id number]]  (Normal) Collected: 10/12/21 1830    Lab Status: Final result Specimen: Blood from Arm, Left Updated: 10/17/21 1900     Blood Culture No growth at 5 days    COVID-19,[facility] IN-HOUSE,Nasal Swab No Transport Media 3-4 HR TAT - Swab, Nasal Cavity [[id number]]  (Abnormal) Collected: 10/12/21 1757    Lab Status: Final result Specimen: Swab from Nasal Cavity Updated: 10/12/21 1855     COVID19 Detected    Narrative:      Fact sheet for providers: https://www.fda.gov/media/324137/download     Fact sheet for patients: https://www.fda.gov/media/133976/download    Test performed by PCR.    Consider negative results in combination with clinical observations, patient history, and epidemiological information.    Blood Culture - Blood, Arm, Left [[id number]]  (Normal) Collected: 10/12/21 1755    Lab Status: Final result Specimen: Blood from Arm, Left Updated: 10/17/21 1900     Blood Culture No growth at 5 days               Radiology:             Imaging Results (Last 72 Hours)     Procedure Component Value Units Date/Time    XR Chest 1 View [[id number]] Collected: 10/18/21 0730     Updated: 10/18/21 0733    Narrative:      EXAMINATION: XR CHEST 1 VW-  10/18/2021 7:30 AM CDT 1 view     HISTORY: Respiratory Decline; J96.01-Acute respiratory failure with  hypoxia; U07.1-COVID-19     COMPARISON: 10/12/2021.     FINDINGS:   Consolidative change in the RIGHT mid to lower lung is slightly worsened  since the previous study. Patchy consolidations in the LEFT mid and  lower lung are also slightly worse since the prior study. The heart is  mildly enlarged.      The osseous structures and surrounding soft  tissues demonstrate no acute  abnormality.          Impression:         1.  Slight interval worsening of consolidative changes in the lungs.        This report was finalized on 10/18/2021 07:30 by Dr. Feliz Magdaleno MD.          Assessment/Plan     Active Hospital Problems    Diagnosis    • **Pneumonia due to COVID-19 virus    • Obesity, Class III, BMI 40-49.9 (morbid obesity) ([facility])    • Type 2 diabetes mellitus with hyperglycemia, without long-term current use of insulin ([facility])    • Hyponatremia    • Acute respiratory failure with hypoxia ([facility])        IMPRESSION:  COVID-19 infection with pneumonia-approximately day #16 from symptom onset and day #15 from positive test.    He did not receive monoclonal antibody therapy.  He did receive Tocilizumab October 13.    Acute respiratory failure with severe hypoxia.  Patient on BiPAP since last night and not tolerating.  Plans are for elective intubation    Morbid obesity    New diagnosis of diabetes mellitus with hemoglobin A1c of 8.4    Elevated CRP-normalized      RECOMMENDATION:   Continue dexamethasone  Respiratory culture  Continue enhanced droplet precautions  O2 support to maintain adequate saturations.  VTE prophylaxis per attending  Diabetes management per attending  Adjuvant therapy per attending    Discussed with MARINO Jackson.    Marguerite Dick MD  10/18/21  08:52 CDT

## 2021-10-18 NOTE — PROCEDURES
"Insert Arterial Line    Date/Time: 10/18/2021 10:37 AM  Performed by: Malik Navarro DO  Authorized by: Malik Navarro DO   Consent: Verbal consent obtained. Written consent not obtained.  Risks and benefits: risks, benefits and alternatives were discussed  Consent given by: patient  Patient understanding: patient states understanding of the procedure being performed  Patient consent: the patient's understanding of the procedure matches consent given  Required items: required blood products, implants, devices, and special equipment available  Patient identity confirmed: arm band  Time out: Immediately prior to procedure a \"time out\" was called to verify the correct patient, procedure, equipment, support staff and site/side marked as required.  Preparation: Patient was prepped and draped in the usual sterile fashion.  Indications: multiple ABGs  Location: right radial    Sedation:  Patient sedated: yes    James's test normal: yes  Needle gauge: 20  Seldinger technique: Seldinger technique used  Number of attempts: 1  Post-procedure: line sutured and dressing applied  Patient tolerance: patient tolerated the procedure well with no immediate complications          Electronically signed by Malik Navarro DO, 10/18/21, 10:38 AM CDT.    "

## 2021-10-18 NOTE — PLAN OF CARE
Goal Outcome Evaluation:  Plan of Care Reviewed With: other (see comments)     Progress: no change  Outcome Summary: Ntn assessment completed. Pt sedated on vent. If pt to remain NPO>5 days may benefit from alternate means of nutrition support. Cont to follow for plan of care.

## 2021-10-18 NOTE — CASE MANAGEMENT/SOCIAL WORK
Continued Stay Note   [place]     Patient Name: Abebe Esquivel  MRN: [id number]  Today's Date: 10/18/2021    Admit Date: 10/12/2021     Discharge Plan     Row Name 10/18/21 1214       Plan    Plan unclear    Plan Comments Patient is now on vent.  Medicaid remains pending.               Discharge Codes    No documentation.                     DANIEL Oakley

## 2021-10-19 PROBLEM — J96.02 ACUTE RESPIRATORY FAILURE WITH HYPERCAPNIA (HCC): Status: ACTIVE | Noted: 2021-01-01

## 2021-10-19 PROBLEM — E11.65 TYPE 2 DIABETES MELLITUS WITH HYPERGLYCEMIA, WITHOUT LONG-TERM CURRENT USE OF INSULIN (HCC): Status: ACTIVE | Noted: 2021-01-01

## 2021-10-19 PROBLEM — E87.5 HYPERKALEMIA: Status: ACTIVE | Noted: 2021-01-01

## 2021-10-19 PROBLEM — N17.9 AKI (ACUTE KIDNEY INJURY) (HCC): Status: ACTIVE | Noted: 2021-01-01

## 2021-10-19 NOTE — CONSULTS
Palliative Medicine Consult    Attending Physician: Ramon  Consulting Physician: Ramon  Reason for Consult: [facility]    CC: unable to obtain while intubated    History:  Abebe Esquivel is a 40 y.o. male who presents today for evaluation of the above problems.  He was first admitted on October 12 with Covid pneumonia and required oxygen from the outset.  However, I yesterday, he had significant worsening of respiratory distress and asked to be intubated, which was pursued.  Today, he has worsening of oxygenation requiring a PEEP of 15 and FiO2 of 100%.  He is also requiring high respiratory rates due to hypercarbia.  He had worsening of renal function and potassium to 6.6, so he has had a dialysis catheter and central line placed today with initiation of CRRT this afternoon.    ROS:  Review of Systems   Unable to perform ROS: Intubated       No Known Allergies  History reviewed. No pertinent past medical history.  History reviewed. No pertinent surgical history.  History reviewed. No pertinent family history.   reports that he has never smoked. He has never used smokeless tobacco. He reports previous alcohol use. He reports that he does not use drugs.      Current Facility-Administered Medications:   •  acetaminophen (TYLENOL) tablet 650 mg, 650 mg, Oral, Q4H PRN **OR** acetaminophen (TYLENOL) suppository 650 mg, 650 mg, Rectal, Q4H PRN, Harris Harden DO  •  albuterol sulfate HFA (PROVENTIL HFA;VENTOLIN HFA;PROAIR HFA) inhaler 2 puff, 2 puff, Inhalation, 4x Daily - RT, Harris Harden DO, 2 puff at 10/19/21 1457  •  artificial tears ophthalmic ointment, , Both Eyes, PRN, Raza Gonzalez MD  •  ascorbic acid (VITAMIN C) tablet 500 mg, 500 mg, Nasogastric, Daily, Malik Navarro DO, 500 mg at 10/19/21 1028  •  atorvastatin (LIPITOR) tablet 20 mg, 20 mg, Nasogastric, Nightly, Malik Navarro DO, 20 mg at 10/18/21 2059  •  benzonatate (TESSALON) capsule 200 mg, 200 mg, Oral, TID PRN, Harris Harden,  , 200 mg at 10/16/21 0108  •  chlorhexidine (PERIDEX) 0.12 % solution 15 mL, 15 mL, Mouth/Throat, Q12H, Malik Navarro DO, 15 mL at 10/18/21 2001  •  cholecalciferol (VITAMIN D3) tablet 2,000 Units, 2,000 Units, Nasogastric, Daily, Malik Navarro DO, 2,000 Units at 10/19/21 1028  •  dexamethasone sodium phosphate injection 10 mg, 10 mg, Intravenous, BID, Zo Ward, APRN  •  dextromethorphan polistirex ER (DELSYM) 30 MG/5ML oral suspension 60 mg, 60 mg, Oral, BID PRN, Dustin Zuniga MD, 60 mg at 10/17/21 1420  •  dextrose (D50W) (25 g/50 mL) IV injection 25 g, 25 g, Intravenous, Q15 Min PRN, Malik Navarro DO  •  dextrose (GLUTOSE) oral gel 15 g, 15 g, Oral, Q15 Min PRN, Malik Navarro DO  •  DIALYSIS USE ONLY - heparin (porcine) injection 3,200 Units, 3,200 Units, Intracatheter, PRN, Manjeet Iglesias MD  •  [START ON 10/20/2021] enoxaparin (LOVENOX) syringe 30 mg, 30 mg, Subcutaneous, Q24H, Malik Navarro DO  •  [START ON 10/20/2021] famotidine (PEPCID) injection 20 mg, 20 mg, Intravenous, Daily, Malik Navarro DO  •  fentaNYL 2500 mcg/250 mL NS infusion,  mcg/hr, Intravenous, Titrated, Malik Navarro DO, Last Rate: 30 mL/hr at 10/19/21 1507, 300 mcg/hr at 10/19/21 1507  •  glucagon (human recombinant) (GLUCAGEN DIAGNOSTIC) injection 1 mg, 1 mg, Subcutaneous, Q15 Min PRN, Malik Navarro DO  •  guaiFENesin (ROBITUSSIN) 100 MG/5ML oral solution 400 mg, 400 mg, Nasogastric, Q6H, Malik Navarro DO, 400 mg at 10/19/21 1231  •  insulin detemir (LEVEMIR) injection 20 Units, 20 Units, Subcutaneous, Q12H, Dustin Zuniga MD, 20 Units at 10/19/21 1045  •  insulin regular (humuLIN R,novoLIN R) injection 0-24 Units, 0-24 Units, Subcutaneous, Q6H, Malik Navarro DO, 12 Units at 10/19/21 1721  •  insulin regular (humuLIN R,novoLIN R) injection 5 Units, 5 Units, Subcutaneous, Q6H, Malik Navarro DO, 5 Units at 10/19/21 1720  •  labetalol (NORMODYNE,TRANDATE) injection 10 mg, 10 mg, Intravenous, Q6H  "PRN, Malik Navarro DO  •  lactated ringers infusion, 75 mL/hr, Intravenous, Continuous, Malik Navarro DO, Last Rate: 75 mL/hr at 10/19/21 1043, 75 mL/hr at 10/19/21 1043  •  meropenem (MERREM) 1 g in sodium chloride 0.9 % 100 mL IVPB-VTB, 1 g, Intravenous, Q24H, Marguerite Dick MD  •  norepinephrine (LEVOPHED) 8 mg in 250 mL NS infusion (premix), 0.02-0.3 mcg/kg/min, Intravenous, Titrated, Malik Navarro DO, Last Rate: 17.55 mL/hr at 10/19/21 1046, 0.06 mcg/kg/min at 10/19/21 1046  •  ondansetron (ZOFRAN) injection 4 mg, 4 mg, Intravenous, Q6H PRN, Harris Harden DO  •  Pharmacy Consult - Pharmacy to dose, , Does not apply, Continuous PRN, Marguerite Dick MD  •  propofol (DIPRIVAN) infusion 10 mg/mL 100 mL, 5-75 mcg/kg/min, Intravenous, Titrated, Malik Navarro DO, Last Rate: 70.2 mL/hr at 10/19/21 1717, 75 mcg/kg/min at 10/19/21 1717  •  sennosides-docusate (PERICOLACE) 8.6-50 MG per tablet 2 tablet, 2 tablet, Oral, Nightly PRN, Dustin Zuniga MD  •  sodium chloride 0.9 % flush 10 mL, 10 mL, Intravenous, Q12H, Harris Harden DO, 10 mL at 10/19/21 1041  •  sodium chloride 0.9 % flush 10 mL, 10 mL, Intravenous, PRN, Harris Harden DO  •  sodium chloride nasal spray 1 spray, 1 spray, Each Nare, PRN, Dustin Zuniga MD, 1 spray at 10/17/21 1418  •  sodium zirconium cyclosilicate (LOKELMA) pack 10 g, 10 g, Oral, TID, Ryan Yip, APRN, 10 g at 10/19/21 1717  •  vecuronium (NORCURON) 100 mg in sodium chloride 0.9 % 100 mL (1 mg/mL) infusion, 0.5 mcg/kg/min, Intravenous, Continuous, Malik Navarro DO, Stopped at 10/19/21 0828  •  zinc sulfate (ZINCATE) capsule 220 mg, 220 mg, Nasogastric, Daily, Malik Navarro DO, 220 mg at 10/19/21 1040    OBJECTIVE:  /61   Pulse 102   Temp 97.4 °F (36.3 °C) (Axillary)   Resp (!) 32   Ht 182.9 cm (72.01\")   Wt (!) 147 kg (325 lb)   SpO2 (!) 80%   BMI 44.07 kg/m²    Physical Exam  Vitals reviewed: visualized through glass " ICU doors to limit exposure and use of PPE given pandemic restrictions.   Constitutional:       Appearance: He is obese. He is ill-appearing.   HENT:      Head: Normocephalic and atraumatic.      Nose: Nose normal.   Cardiovascular:      Rate and Rhythm: Tachycardia present.   Pulmonary:      Comments: Tachypnea due to vent settings with sats ~80%.   Abdominal:      General: There is distension.         Pertinent labs and imaging reviewed including, but not limited to the following: CXR with dense consolidation bilaterally today, CT with consolidation from 10/12, Cr to 2.59, but with K+ to 6.6 today    Assessment    -Pneumonia due to COVID19  - Acute respiratory failure with hypoxemia and hypercapnea  - ARDS due to COVID  - KATYA  - Type 2 diabetes-new diagnosis with persistent hyperglycemia  - Morbid obesity    PPS  Unable to assess on sedation    Symptoms    1. Respiratory distress  2. Debility    Plan    1. His clinical status is quite tenuous. I have reached out to his mother, confirmed that he is legally  with her and she is therefore his legal surrogate in the absence of an advanced directive.     2. His mother indicates that he has two young children and he would want to fight through this for them. He has just been placed on CRRT at the time of my visit to see him and given intubation yesterday, it is not unreasonable to allow for time to monitor this situation and see if he is able to improve. She understands he is in a difficult position and we will continue ongoing discussions as appropriate with clinical changes.     Discussed case with bedside RN and with Dr. Camacho who performed procedures this afternoon.       Thank you for this consult. Please contact me with any questions.         Bimal Flores D.O. 10/19/2021   Palliative Medicine

## 2021-10-19 NOTE — PROGRESS NOTES
PULMONARY AND CRITICAL CARE PROGRESS NOTE - [facility]    Patient: Abebe Esquivel    1980    MR# [id number]    Acct# [id number]  10/19/21   08:33 CDT  Referring Provider: Malik Navarro DO    Chief Complaint: Mechanically ventilated    Interval history: The patient was seen from outside the glass to preserve PPE and limit exposure. He required intubation yesterday 10-18-21. He remains intubated and sedated on propofol and fentanyl. He is paralyzed on vecuronium drip. BIS 40s per nursing report. CVP 19. He is requiring levophed for blood pressure support. Oxygen saturation 89% on peep of 15 and fio2 1.0. ETco2 34. He is prone. Inflammatory markers trending up. He remains afebrile.       Meds:  albuterol sulfate HFA, 2 puff, Inhalation, 4x Daily - RT  ascorbic acid, 500 mg, Nasogastric, Daily  atorvastatin, 20 mg, Nasogastric, Nightly  chlorhexidine, 15 mL, Mouth/Throat, Q12H  cholecalciferol, 2,000 Units, Nasogastric, Daily  dexamethasone, 6 mg, Intravenous, BID  enoxaparin, 40 mg, Subcutaneous, Q12H  famotidine, 20 mg, Intravenous, Q12H  guaiFENesin, 400 mg, Nasogastric, Q6H  insulin detemir, 20 Units, Subcutaneous, Q12H  insulin regular, 0-24 Units, Subcutaneous, Q6H  insulin regular, 5 Units, Subcutaneous, Q6H  lactated ringers, 500 mL, Intravenous, Once  sodium chloride, 10 mL, Intravenous, Q12H  zinc sulfate, 220 mg, Nasogastric, Daily      fentanyl 10 mcg/mL,  mcg/hr, Last Rate: 300 mcg/hr (10/19/21 0521)  lactated ringers, 75 mL/hr  norepinephrine, 0.02-0.3 mcg/kg/min, Last Rate: 0.06 mcg/kg/min (10/19/21 0529)  propofol, 5-75 mcg/kg/min, Last Rate: 75 mcg/kg/min (10/19/21 0711)  vecuronium, 0.5 mcg/kg/min, Last Rate: Stopped (10/19/21 0828)      Review of Systems:   Cannot obtain due to mechanical ventilated state  Ventilator Settings:        Resp Rate (Set): 32  Pressure Support (cm H2O): 10 cm H20  FiO2 (%): 100 %  PEEP/CPAP (cm H2O): 15 cm H20  Minute Ventilation (L/min)  (Obs): 14.2 L/min  Resp Rate (Observed) Vent: 32     I:E Ratio (Obs): 1:1.10  PIP Observed (cm H2O): 37 cm H2O     Physical Exam:  Temp:  [97.9 °F (36.6 °C)-98 °F (36.7 °C)] 98 °F (36.7 °C)  Heart Rate:  [] 105  Resp:  [30-32] 32  BP: ()/() 109/67  Arterial Line BP: ()/() 115/61  FiO2 (%):  [30 %-100 %] 100 %  Intake/Output Summary (Last 24 hours) at 10/19/2021 0833  Last data filed at 10/19/2021 0400  Gross per 24 hour   Intake 1833.95 ml   Output 550 ml   Net 1283.95 ml     SpO2 Percentage    10/19/21 0529 10/19/21 0600 10/19/21 0704   SpO2: 92% 91% 90%      GENERAL/CONSTITUTIONAL: no acute distress.  Pt is on vent, multiple drips  HEENT: atraumatic, normocephalic  NOSE: normal  NECK: jugular veins nondistended  CHEST: no paradox, no retractions.  No respiratory distress.   Vent synchrony: yes  CARDIAC: tachycardic rhythm, rate 105  ABDOMEN: nondistended  : menezes catheter   EXTREMITIES: not visible  NEURO:  sedated, mechanically ventilated  SKIN: no jaundice.  No rash   Results from last 7 days   Lab Units 10/19/21  0326 10/18/21  0419 10/17/21  0426   WBC 10*3/mm3 32.08* 13.32* 10.81*   HEMOGLOBIN g/dL 16.2 15.9 15.0   PLATELETS 10*3/mm3 175 204 261     Results from last 7 days   Lab Units 10/19/21  0326 10/18/21  0419 10/17/21  2354 10/17/21  0426 10/17/21  0426 10/16/21  0511 10/16/21  0042 10/16/21  0042 10/13/21  0321 10/12/21  1755   SODIUM mmol/L 131* 136  --   --  137   < >  --   --    < > 130*   POTASSIUM mmol/L 6.6* 4.9  --   --  3.8   < >  --   --    < > 3.6   BUN mg/dL 43* 24*  --   --  28*   < >  --   --    < > 21*   CREATININE mg/dL 2.59* 1.10  --   --  1.03   < >  --   --    < > 1.31*   CRP mg/dL 2.13* 0.39  --    < > 0.52*   < >  --   --    < >  --    PROCALCITONIN ng/mL  --   --  0.14  --   --   --   --  0.14   < > 0.33*   FERRITIN ng/mL 1,608.00*  --  1,582.00*  --   --   --    < > 1,641.00*   < > 1,328.00*   D DIMER QUANT mg/L (FEU)  --   --   --   --   --   --    --   --   --  1.35*    < > = values in this interval not displayed.     Results from last 7 days   Lab Units 10/19/21  0307 10/18/21  1510 10/18/21  1050   PH, ARTERIAL pH units 7.116* 7.113* 7.210*   PCO2, ARTERIAL mm Hg 79.3* 85.7* 66.0*   PO2 ART mm Hg 60.9* 73.0* 53.6*   FIO2 % 100 100 100     Blood Culture   Date Value Ref Range Status   10/12/2021 No growth at 5 days  Final   10/12/2021 No growth at 5 days  Final     Recent films:  XR Chest 1 View    Result Date: 10/18/2021  1. Bilateral pulmonary opacities, which could represent edema, infection or ARDS. These are similar compared to earlier the same day, increased compared to 10/12/2021. 2. Lines and tubes as above. This report was finalized on 10/18/2021 11:54 by Dr Balbina Roth MD.    XR Chest 1 View    Result Date: 10/18/2021   1.  Slight interval worsening of consolidative changes in the lungs.   This report was finalized on 10/18/2021 07:30 by Dr. Feliz Magdaleno MD.    XR Abdomen KUB    Result Date: 10/18/2021  1. Limited exam. Gastric tube tip projects at the distal gastric body. This report was finalized on 10/18/2021 12:05 by Dr Balbina Roth MD.    Films reviewed personally by me.  My interpretation: None today 10-19-21  Pulmonary Assessment:  1. Acute respiratory failure with hypoxia due to Covid-19  2. Unvaccinated status, did not receive Regeneron  3. S/P Tocilizumab   4. Obesity   5. Leukocytosis   6. Acute kidney injury  7. Hyperglycemia    Recommend:   · ETT D#2. Continue current vent settings. ACVC rate 32, tv 450, peep 15, fio2 1.0. Titrate oxygen for saturation 90%  · Repeat ABG and cxr intermittently  · Continue dvt prophylaxis, lovenox  · Continue stress ulcer prophylaxis while on vent, pepcid  · Proning candidate: yes  · Dexamethasone day 7 of 10, daily  · Limit paralytics  · Continue albuterol hfa and guaifenesin   · Antibiotic: Meropenem D#1- ID following   · Nephrology consult pending  · Code status: Full  · Nutrition consult  for tube feeding assesment       Electronically signed by CESIA Manuel on 10/19/2021 at 08:33 CDT    Physician substantive portion:  Patient remains on the ventilator having been intubated yesterday.  He has deteriorated dramatically over the past 24 hours.  Now on maximal support with high PEEP high FiO2 and poor saturations.  Respirations are unlabored although receiving paralytics..  He is very sedated.  The abdomen is obese.  He is prone positioned.  Post intubation chest x-ray showed diffuse infiltrates.  ET tube in good position.  Prognosis appears poor.    I have seen and examined patient personally, performing a face-to-face diagnostic evaluation with plan of care reviewed and developed with APRN and nursing staff. I have addended and/or modified the above history of present illness, physical examination, and assessment and plan to reflect my findings and impressions. Essential elements of the care plan were discussed with APRN above.  Agree with findings and assessment/plan as documented above.    Electronically signed by Raza Gonzalez MD, on 10/19/2021, 09:50 CDT

## 2021-10-19 NOTE — PROGRESS NOTES
"Pharmacy Dosing Service  Antimicrobial Dosing  Merrem    Assessment/Action/Plan:  Active Hospital Problems    Diagnosis  POA   • **Pneumonia due to COVID-19 virus [U07.1, J12.82]  Yes   • KATYA (acute kidney injury) ([facility]) [N17.9]  No   • Hyperkalemia [E87.5]  No   • Acute respiratory failure with hypercapnia ([facility]) [J96.02]  No   • Type 2 diabetes mellitus with hyperglycemia, without long-term current use of insulin ([facility]) [E11.65]  Yes   • Obesity, Class III, BMI 40-49.9 (morbid obesity) ([facility]) [E66.01]  Yes   • Hyponatremia [E87.1]  Yes   • Acute respiratory distress syndrome (ARDS) due to 2019 novel coronavirus ([facility]) [U07.1, J80]  No   • Acute respiratory failure with hypoxia ([facility]) [J96.01]  Yes     ID following, sepsis unknown etiology. Start empiric antibiotics        Pharmacy will continue to follow    UPDATE: 10/19/21 16:11 CDT   Patient has started hemodialysis. Dose was given earlier this morning. Patient receiving hemodialysis now. Orders changed for Merrem 1 g IV every 24 hours over 3 hours to be given tonight around 2100 after HD since first dose was pre-HD. 7 days of therapy ordered. --Espinoza Lucero, PharmD       Subjective:  Abebe Esquivel is a 40 y.o. male with a  \"Pharmacy to Dose Merrem\" consult for the treatment of sepsis , day 1 of 7 of treatment.    Objective:  Ht: 182.9 cm (72.01\"); Wt: (!) 147 kg (325 lb)  Estimated Creatinine Clearance: 56.3 mL/min (A) (by C-G formula based on SCr of 2.59 mg/dL (H)).   Creatinine   Date Value Ref Range Status   10/19/2021 2.59 (H) 0.76 - 1.27 mg/dL Final   10/18/2021 1.10 0.76 - 1.27 mg/dL Final   10/17/2021 1.03 0.76 - 1.27 mg/dL Final      Lab Results   Component Value Date    WBC 32.08 (C) 10/19/2021    WBC 13.32 (H) 10/18/2021    WBC 10.81 (H) 10/17/2021      Baseline culture results:  Microbiology Results (last 10 days)       Procedure Component Value - Date/Time    MRSA Screen, PCR (Inpatient) - Swab, Nares [[id number]]  (Normal) Collected: 10/18/21 1200 "    Lab Status: Final result Specimen: Swab from Nares Updated: 10/18/21 1327     MRSA PCR No MRSA Detected    S. Pneumo Ag Urine or CSF - Urine, Urine, Clean Catch [[id number]]  (Normal) Collected: 10/18/21 1200    Lab Status: Final result Specimen: Urine, Clean Catch Updated: 10/18/21 1238     Strep Pneumo Ag Negative    Legionella Antigen, Urine - Urine, Urine, Clean Catch [[id number]]  (Normal) Collected: 10/18/21 1200    Lab Status: Final result Specimen: Urine, Clean Catch Updated: 10/18/21 1238     LEGIONELLA ANTIGEN, URINE Negative    Respiratory Culture - Aspirate, ET Suction [[id number]] Collected: 10/18/21 1155    Lab Status: Preliminary result Specimen: Aspirate from ET Suction Updated: 10/18/21 1516     Gram Stain Greater than 25 WBCs per low power field      Rare (1+) Epithelial cells per low power field      Few (2+) Mixed gram positive aida    Blood Culture - Blood, Arm, Left [[id number]]  (Normal) Collected: 10/12/21 1830    Lab Status: Final result Specimen: Blood from Arm, Left Updated: 10/17/21 1900     Blood Culture No growth at 5 days    COVID-19,[facility] IN-HOUSE,Nasal Swab No Transport Media 3-4 HR TAT - Swab, Nasal Cavity [[id number]]  (Abnormal) Collected: 10/12/21 1757    Lab Status: Final result Specimen: Swab from Nasal Cavity Updated: 10/12/21 1855     COVID19 Detected    Narrative:      Fact sheet for providers: https://www.fda.gov/media/684451/download     Fact sheet for patients: https://www.fda.gov/media/610093/download    Test performed by PCR.    Consider negative results in combination with clinical observations, patient history, and epidemiological information.    Blood Culture - Blood, Arm, Left [[id number]]  (Normal) Collected: 10/12/21 1755    Lab Status: Final result Specimen: Blood from Arm, Left Updated: 10/17/21 1900     Blood Culture No growth at 5 days            Espinoza Lucero, [place]  10/19/21 08:48 CDT

## 2021-10-19 NOTE — PLAN OF CARE
Goal Outcome Evaluation:  Plan of Care Reviewed With: other (see comments) (RN)      Progress: no change  Outcome Summary: Ntn consult for tube feeding.Peptamen AF at 15 ml/hr. Auto flush of 25 ml/hr via pump.Beneprotein 2 packets mixed with 200 ml water BID per OG tube.Should pt be prone: Reverse Trendelenburg: head of bed elevation at least 10-25 degrees to minimize aspiration risk. Cont to follow for plan of care.

## 2021-10-19 NOTE — PROGRESS NOTES
[facility] Medicine Services  INPATIENT PROGRESS NOTE    Patient Name: Abebe Esquivel  Date of Admission: 10/12/2021  Today's Date: 10/19/21  Length of Stay: 7  Primary Care Physician: Froylan Littlejohn DO    Subjective   Chief Complaint: Shortness of breath.   HPI   He needed to be and wanted to be intubated yesterday. I informed his mother by phone. We did so and placed him prone. Plans to return to supine today. We have had to use paralytics at times to combat ventilator dyssynchrony.    He has developed an KATYA.  I did diurese him after intubation yesterday secondary to his CVP of 19 and felt him to be likely volume overloaded regarding his exam and chest x-ray.  He also developed hypotension secondary to multiple sedatives.  He has been requiring norepinephrine.  Plan to involve nephrology today.    He has mounted a more significant leukocytosis.  However, T-max over the last 24 hours is 99.7. Discussed with Dr. Dick. She has added Merrem this morning.     fentanyl 10 mcg/mL,  mcg/hr, Last Rate: 300 mcg/hr (10/19/21 0521)  lactated ringers, 75 mL/hr  norepinephrine, 0.02-0.3 mcg/kg/min, Last Rate: 0.06 mcg/kg/min (10/19/21 0547)  propofol, 5-75 mcg/kg/min, Last Rate: 75 mcg/kg/min (10/19/21 7530)  vecuronium, 0.5 mcg/kg/min, Last Rate: 0.5 mcg/kg/min (10/19/21 8713)    Review of Systems  Unable to assess secondary to the patient's intubated and sedated state.     Objective    Temp:  [97.9 °F (36.6 °C)-99.7 °F (37.6 °C)] 98 °F (36.7 °C)  Heart Rate:  [] 105  Resp:  [30-38] 32  BP: ()/() 109/67  Arterial Line BP: ()/() 115/61  FiO2 (%):  [30 %-100 %] 100 %  Physical Exam  Constitutional:       Appearance: He is well-developed. He is obese.      Comments: Intubated, sedated. Spoke with his mother by phone. Discussed with his nurse, Hardy.    HENT:      Head: Normocephalic and atraumatic.   Eyes:      Conjunctiva/sclera:  Conjunctivae normal.      Pupils: Pupils are equal, round, and reactive to light.   Neck:      Vascular: No JVD.   Cardiovascular:      Rate and Rhythm: Normal rate and regular rhythm.      Heart sounds: Normal heart sounds. No murmur heard.  No friction rub. No gallop.    Pulmonary:      Comments: Ventilated, diminished. Remains prone. Plans to return to supine this morning.   Abdominal:      General: Bowel sounds are normal. There is no distension.      Palpations: Abdomen is soft.      Tenderness: There is no abdominal tenderness.   Musculoskeletal:         General: No tenderness or deformity. Normal range of motion.      Cervical back: Neck supple.      Comments: R IJ CVC and R radial art line placed on 10/18 by me in a sterile fashion.    Skin:     General: Skin is warm and dry.      Findings: No rash.   Neurological:      Motor: No abnormal muscle tone.      Deep Tendon Reflexes: Reflexes normal.         Intake/Output Summary (Last 24 hours) at 10/19/2021 0745  Last data filed at 10/19/2021 0400  Gross per 24 hour   Intake 1833.95 ml   Output 550 ml   Net 1283.95 ml     Results Review:  I have reviewed the labs, radiology results, and diagnostic studies.    Laboratory Data:   Results from last 7 days   Lab Units 10/19/21  0326 10/18/21  0419 10/17/21  0426   WBC 10*3/mm3 32.08* 13.32* 10.81*   HEMOGLOBIN g/dL 16.2 15.9 15.0   HEMATOCRIT % 49.0 48.4 45.6   PLATELETS 10*3/mm3 175 204 261     Results from last 7 days   Lab Units 10/19/21  0326 10/18/21  0419 10/17/21  0426   SODIUM mmol/L 131* 136 137   POTASSIUM mmol/L 6.6* 4.9 3.8   CHLORIDE mmol/L 96* 100 103   CO2 mmol/L 23.0 25.0 23.0   BUN mg/dL 43* 24* 28*   CREATININE mg/dL 2.59* 1.10 1.03   CALCIUM mg/dL 7.7* 8.9 8.6   BILIRUBIN mg/dL 1.1 0.7 0.6   ALK PHOS U/L 120* 100 88   ALT (SGPT) U/L 33 37 28   AST (SGOT) U/L 44* 44* 37   GLUCOSE mg/dL 349* 226* 219*     COVID LABS:  Results From Last 14 Days   Lab Units 10/19/21  0326 10/18/21  0419 10/17/21  6118  10/17/21  0426 10/16/21  0511 10/16/21  0042 10/15/21  0323 10/14/21  0307 10/13/21  0321 10/13/21  0321 10/12/21  1755 10/12/21  1755   CRP mg/dL 2.13* 0.39  --  0.52*   < >  --    < > 5.40*   < > 9.37*  --   --    D DIMER QUANT mg/L (FEU)  --   --   --   --   --   --   --   --   --   --   --  1.35*   FERRITIN ng/mL 1,608.00*  --  1,582.00*  --   --  1,641.00*  --  1,502.00*   < > 1,243.00*   < > 1,328.00*   LACTATE mmol/L  --   --   --   --   --   --   --   --   --   --   --  1.7   LDH U/L  --   --   --   --   --   --   --   --   --   --   --  694*   PROCALCITONIN ng/mL  --   --  0.14  --   --  0.14  --  0.26*  --   --    < > 0.33*   TROPONIN T ng/mL  --   --   --   --   --   --   --   --   --  <0.010  --  <0.010   TRIGLYCERIDES mg/dL  --   --   --   --   --   --   --  270*  --   --   --   --     < > = values in this interval not displayed.     Radiology Data:   Imaging Results (Last 24 Hours)     Procedure Component Value Units Date/Time    XR Abdomen KUB [[id number]] Collected: 10/18/21 1201     Updated: 10/18/21 1209    Narrative:      EXAM: XR ABDOMEN KUB- - 10/18/2021 10:43 AM CDT     HISTORY: OG placement; J96.01-Acute respiratory failure with hypoxia;  U07.1-COVID-19; U07.1-COVID-19; J12.82-Pneumonia due to coronavirus  disease 2019       COMPARISON: No existing relevant imaging studies available.      TECHNIQUE:  1 images.  Supine portable view the upper abdomen     FINDINGS:    See impression          Impression:      1. Limited exam. Gastric tube tip projects at the distal gastric body.  This report was finalized on 10/18/2021 12:05 by Dr Balbina Roth MD.    XR Chest 1 View [[id number]] Collected: 10/18/21 1148     Updated: 10/18/21 1157    Narrative:      EXAM: XR CHEST 1 VW- - 10/18/2021 10:43 AM CDT     HISTORY: ett; J96.01-Acute respiratory failure with hypoxia;  U07.1-COVID-19; U07.1-COVID-19; J12.82-Pneumonia due to coronavirus  disease 2019       COMPARISON: 10/18/2021.      TECHNIQUE:  1 images.   Frontal view of the chest.     FINDINGS:    Endotracheal tube tip projects in mid trachea. Right central venous  catheter tip projects at lower SVC.     No pneumothorax or large pleural effusion. Bilateral pulmonary  opacities.     Cardiac and mediastinal silhouette appear within normal limits. No acute  bony finding on portable radiograph.          Impression:      1. Bilateral pulmonary opacities, which could represent edema, infection  or ARDS. These are similar compared to earlier the same day, increased  compared to 10/12/2021.  2. Lines and tubes as above.  This report was finalized on 10/18/2021 11:54 by Dr Balbina Roth MD.        Results from last 7 days   Lab Units 10/19/21  0307   PH, ARTERIAL pH units 7.116*   PO2 ART mm Hg 60.9*   PCO2, ARTERIAL mm Hg 79.3*   HCO3 ART mmol/L 25.5     FiO2 (%):  [30 %-100 %] 100 %  S RR:  [28-32] 32  PEEP/CPAP (cm H2O):  [5 cm H20-18 cm H20] 15 cm H20  WV SUP:  [10 cm H20] 10 cm H20  MAP (cm H2O):  [0-32] 26    I have reviewed the patient's current medications.     Assessment/Plan     Active Hospital Problems    Diagnosis    • **Pneumonia due to COVID-19 virus    • Acute respiratory distress syndrome (ARDS) due to 2019 novel coronavirus (HCC)    • Acute respiratory failure with hypoxia (HCC)    • Acute respiratory failure with hypercapnia (HCC)    • KATYA (acute kidney injury) (HCC)    • Hyperkalemia    • Type 2 diabetes mellitus with hyperglycemia, without long-term current use of insulin (HCC)    • Obesity, Class III, BMI 40-49.9 (morbid obesity) (HCC)    • Hyponatremia      Plan:  The patient was admitted on 10/12 by Dr. Harden.  He presented to the emergency department with complaints of shortness of breath, cough, and fever.  He started symptoms of COVID-19 around 10/4 and tested positive on 10/5.  He is unvaccinated.  He required 5 L of oxygen to maintain saturations greater than 90% at presentation.    He was felt to be out of the window for benefit from  remdesivir.  He was administered Tocilizumab on 10/13.  He continues to receive Decadron.  He continues to receive adjunctive treatments.    He continued to worsen from a respiratory standpoint throughout the course of his stay here.  He was ultimately placed on Vapotherm and then required BiPAP overnight from 10/17-10/18.  Dr. Gonzalez with pulmonology help that the patient should be electively intubated on the morning of 10/18.  The patient requested intubation secondary to his work of breathing and overall fatigue.  He was placed in the prone position shortly after intubation. Plans to return to supine this morning.  He is requiring paralytics at times for ventilator dyssynchrony.  He is requiring fentanyl and propofol for sedation.    Albuterol HFA, Mucinex.    CRP had recently normalized, but back up a bit today.  Ferritin remains stable.      Add a new procalcitonin to existing blood.  Streptococcal and Legionella urinary antigens negative.  MRSA nasal screen negative.  Sputum culture with mixed gram-positive aida. Dr. Dick with ID has added Merrem today.     He has developed an KATYA, which is felt to be multifactorial.  He was diuresed shortly after intubation secondary to elevated CVP and concerns for general volume overload given his exam and chest x-ray.  No plans for further diuretics.  Give LR.  Hyperkalemic this morning, medically treated by the overnight physician.  Repeat potassium level at 0800.  Urinalysis, urine electrolytes.  Consult nephrology.    He was not previously known to have diabetes.  His hemoglobin A1c is 8.4.  Continue basal insulin. Continue Accu-Cheks and sliding scale insulin.  Add every 6 hour regular insulin.    Pepcid for peptic ulcer prophylaxis.    Lovenox for DVT prophylaxis.    I called his mother, Aicha, at [phone]. She has been updated about his status this morning. Questions answered.     Electronically signed by Malik Navarro, , 10/19/21, 07:43  CDT.    Approximately 35 minutes of critical care time were spent managing the patient exclusive of billable procedures.

## 2021-10-19 NOTE — PROGRESS NOTES
INFECTIOUS DISEASES PROGRESS NOTE    Patient:  Abebe Esquivel  YOB: 1980  MRN: [id number]   Admit date: 10/12/2021   Admitting Physician: Malik Navarro DO  Primary Care Physician: Froylan Littlejohn DO    Chief Complaint: COVID-19 infection        Interval History: Reviewed with MARINO Dash.  Dr. Gonzalez and CESIA Pathak at door side as well.  Patient was intubated yesterday.  He was proned shortly thereafter.  He has done poorly.  Oxygenation has been difficult with an FiO2 of 100% and PEEP is now at 15.    Plans are to try to see how patient does off paralytics.  Also patient will need to be placed in supine position again.    Patient has developed acute renal failure        Allergies: No Known Allergies    Current Meds:     Current Facility-Administered Medications:   •  acetaminophen (TYLENOL) tablet 650 mg, 650 mg, Oral, Q4H PRN **OR** acetaminophen (TYLENOL) suppository 650 mg, 650 mg, Rectal, Q4H PRN, Harris Harden DO  •  albuterol sulfate HFA (PROVENTIL HFA;VENTOLIN HFA;PROAIR HFA) inhaler 2 puff, 2 puff, Inhalation, 4x Daily - RT, Harris Harden DO, 2 puff at 10/19/21 0704  •  artificial tears ophthalmic ointment, , Both Eyes, PRN, Raza Gonzalez MD  •  ascorbic acid (VITAMIN C) tablet 500 mg, 500 mg, Nasogastric, Daily, Malik Navarro DO  •  atorvastatin (LIPITOR) tablet 20 mg, 20 mg, Nasogastric, Nightly, Malik Navarro DO, 20 mg at 10/18/21 2059  •  benzonatate (TESSALON) capsule 200 mg, 200 mg, Oral, TID PRN, Harris Harden DO, 200 mg at 10/16/21 0108  •  chlorhexidine (PERIDEX) 0.12 % solution 15 mL, 15 mL, Mouth/Throat, Q12H, Malik Navarro DO, 15 mL at 10/18/21 2001  •  cholecalciferol (VITAMIN D3) tablet 2,000 Units, 2,000 Units, Nasogastric, Daily, Malik Navarro DO  •  dexamethasone (DECADRON) injection 6 mg, 6 mg, Intravenous, BID, Zo Ward, APRN, 6 mg at 10/18/21 2059  •  dextromethorphan polistirex ER (DELSYM) 30 MG/5ML oral suspension 60  mg, 60 mg, Oral, BID PRN, Dustin Zuniga MD, 60 mg at 10/17/21 1420  •  dextrose (D50W) (25 g/50 mL) IV injection 25 g, 25 g, Intravenous, Q15 Min PRN, Malik Navarro DO  •  dextrose (GLUTOSE) oral gel 15 g, 15 g, Oral, Q15 Min PRN, Malik Navarro, DO  •  enoxaparin (LOVENOX) syringe 40 mg, 40 mg, Subcutaneous, Q12H, Harris Harden, , 40 mg at 10/18/21 2001  •  famotidine (PEPCID) injection 20 mg, 20 mg, Intravenous, Q12H, Malik Navarro DO, 20 mg at 10/18/21 2001  •  fentaNYL 2500 mcg/250 mL NS infusion,  mcg/hr, Intravenous, Titrated, Malik Navarro DO, Last Rate: 30 mL/hr at 10/19/21 0521, 300 mcg/hr at 10/19/21 0521  •  glucagon (human recombinant) (GLUCAGEN DIAGNOSTIC) injection 1 mg, 1 mg, Subcutaneous, Q15 Min PRN, Malik Navarro DO  •  guaiFENesin (ROBITUSSIN) 100 MG/5ML oral solution 400 mg, 400 mg, Nasogastric, Q6H, Malik Navarro DO, 400 mg at 10/19/21 0521  •  insulin detemir (LEVEMIR) injection 20 Units, 20 Units, Subcutaneous, Q12H, Dustin Zuniga MD, 20 Units at 10/18/21 2002  •  insulin regular (humuLIN R,novoLIN R) injection 0-24 Units, 0-24 Units, Subcutaneous, Q6H, Malik Navarro DO, 20 Units at 10/19/21 0520  •  insulin regular (humuLIN R,novoLIN R) injection 5 Units, 5 Units, Subcutaneous, Q6H, Malik Navarro DO  •  labetalol (NORMODYNE,TRANDATE) injection 10 mg, 10 mg, Intravenous, Q6H PRN, Malik Navarro DO  •  lactated ringers bolus 500 mL, 500 mL, Intravenous, Once, Malik Navarro DO  •  lactated ringers infusion, 75 mL/hr, Intravenous, Continuous, Malik Navarro DO  •  norepinephrine (LEVOPHED) 8 mg in 250 mL NS infusion (premix), 0.02-0.3 mcg/kg/min, Intravenous, Titrated, Malik Navarro DO, Last Rate: 17.55 mL/hr at 10/19/21 0529, 0.06 mcg/kg/min at 10/19/21 0529  •  ondansetron (ZOFRAN) injection 4 mg, 4 mg, Intravenous, Q6H PRN, Harris Harden DO  •  propofol (DIPRIVAN) infusion 10 mg/mL 100 mL, 5-75 mcg/kg/min, Intravenous, Titrated, Ramon  "Malik FREEMAN DO, Last Rate: 70.2 mL/hr at 10/19/21 0711, 75 mcg/kg/min at 10/19/21 0711  •  sennosides-docusate (PERICOLACE) 8.6-50 MG per tablet 2 tablet, 2 tablet, Oral, Nightly PRN, Dustin Zuniga MD  •  sodium chloride 0.9 % flush 10 mL, 10 mL, Intravenous, Q12H, Harris Harden DO, 10 mL at 10/18/21 2001  •  sodium chloride 0.9 % flush 10 mL, 10 mL, Intravenous, PRN, Harris Harden DO  •  sodium chloride nasal spray 1 spray, 1 spray, Each Nare, PRN, Dustin Zuniga MD, 1 spray at 10/17/21 1418  •  vecuronium (NORCURON) 100 mg in sodium chloride 0.9 % 100 mL (1 mg/mL) infusion, 0.5 mcg/kg/min, Intravenous, Continuous, Malik Navarro DO, Last Rate: 4.68 mL/hr at 10/19/21 0237, 0.5 mcg/kg/min at 10/19/21 0237  •  zinc sulfate (ZINCATE) capsule 220 mg, 220 mg, Nasogastric, Daily, Malik Navarro DO      Review of Systems   Unable to perform ROS: Intubated        Objective     Vital Signs:  Temp (24hrs), Av.9 °F (36.6 °C), Min:97.9 °F (36.6 °C), Max:98 °F (36.7 °C)      /67   Pulse 105   Temp 98 °F (36.7 °C) (Axillary)   Resp (!) 32   Ht 182.9 cm (72.01\")   Wt (!) 147 kg (325 lb)   SpO2 90%   BMI 44.07 kg/m²         Physical Exam  General: Patient in prone position.  HEENT: ET tube in place  Cardiac: Rhythm regular rate in the 100s  Neuro: Sedated on vent  Psych: Sedated on vent     Results Review:    I reviewed the patient's new clinical results.    Lab Results:  CBC:   Lab Results   Lab 10/13/21  0321 10/14/21  0307 10/15/21  0323 10/16/21  0511 10/17/21  0426 10/18/21  0419 10/19/21  0326   WBC 6.00 8.45 10.59 9.60 10.81* 13.32* 32.08*   HEMOGLOBIN 14.6 15.4 15.2 15.2 15.0 15.9 16.2   HEMATOCRIT 43.2 44.9 45.9 44.2 45.6 48.4 49.0   PLATELETS 178 254 322 284 261 204 175   LYMPHOCYTE %  --   --   --   --  3.0* 6.1* 1.0*   MONOCYTES %  --   --   --   --  6.0 7.1 1.0*     87.5% neutrophils/1% lymphocytes/1% monocytes/9.4% bands/1% metamyelocytes    CMP:   Lab Results   Lab 10/17/21  0426 " 10/18/21  0419 10/19/21  0326   SODIUM 137 136 131*   POTASSIUM 3.8 4.9 6.6*   CHLORIDE 103 100 96*   CO2 23.0 25.0 23.0   BUN 28* 24* 43*   CREATININE 1.03 1.10 2.59*   CALCIUM 8.6 8.9 7.7*   BILIRUBIN 0.6 0.7 1.1   ALK PHOS 88 100 120*   ALT (SGPT) 28 37 33   AST (SGOT) 37 44* 44*   GLUCOSE 219* 226* 349*       COVID LABS:  Results From Last 14 Days   Lab Units 10/19/21  0326 10/18/21  0419 10/17/21  2354 10/17/21  0426 10/16/21  0511 10/16/21  0042 10/15/21  0323 10/14/21  0307 10/13/21  0321 10/13/21  0321 10/12/21  1755 10/12/21  1755   CRP mg/dL 2.13* 0.39  --  0.52*   < >  --    < > 5.40*   < > 9.37*  --   --    D DIMER QUANT mg/L (FEU)  --   --   --   --   --   --   --   --   --   --   --  1.35*   FERRITIN ng/mL 1,608.00*  --  1,582.00*  --   --  1,641.00*  --  1,502.00*   < > 1,243.00*   < > 1,328.00*   LACTATE mmol/L  --   --   --   --   --   --   --   --   --   --   --  1.7   LDH U/L  --   --   --   --   --   --   --   --   --   --   --  694*   PROCALCITONIN ng/mL  --   --  0.14  --   --  0.14  --  0.26*  --   --    < > 0.33*   TROPONIN T ng/mL  --   --   --   --   --   --   --   --   --  <0.010  --  <0.010   TRIGLYCERIDES mg/dL  --   --   --   --   --   --   --  270*  --   --   --   --     < > = values in this interval not displayed.       Component   Ref Range & Units 1 d ago   Hemoglobin A1C   4.80 - 5.60 % 8.40 High     Resulting Agency  [facility]         Estimated Creatinine Clearance: 56.3 mL/min (A) (by C-G formula based on SCr of 2.59 mg/dL (H)).    Culture Results:    Microbiology Results (last 10 days)     Procedure Component Value - Date/Time    MRSA Screen, PCR (Inpatient) - Swab, Nares [[id number]]  (Normal) Collected: 10/18/21 1200    Lab Status: Final result Specimen: Swab from Nares Updated: 10/18/21 1327     MRSA PCR No MRSA Detected    S. Pneumo Ag Urine or CSF - Urine, Urine, Clean Catch [[id number]]  (Normal) Collected: 10/18/21 1200    Lab Status: Final result Specimen: Urine, Clean  Catch Updated: 10/18/21 1238     Strep Pneumo Ag Negative    Legionella Antigen, Urine - Urine, Urine, Clean Catch [[id number]]  (Normal) Collected: 10/18/21 1200    Lab Status: Final result Specimen: Urine, Clean Catch Updated: 10/18/21 1238     LEGIONELLA ANTIGEN, URINE Negative    Respiratory Culture - Aspirate, ET Suction [[id number]] Collected: 10/18/21 1155    Lab Status: Preliminary result Specimen: Aspirate from ET Suction Updated: 10/18/21 1516     Gram Stain Greater than 25 WBCs per low power field      Rare (1+) Epithelial cells per low power field      Few (2+) Mixed gram positive aida    Blood Culture - Blood, Arm, Left [[id number]]  (Normal) Collected: 10/12/21 1830    Lab Status: Final result Specimen: Blood from Arm, Left Updated: 10/17/21 1900     Blood Culture No growth at 5 days    COVID-19,[facility]HOU[facility],Nasal Swab No Transport Media 3-4 HR TAT - Swab, Nasal Cavity [[id number]]  (Abnormal) Collected: 10/12/21 1757    Lab Status: Final result Specimen: Swab from Nasal Cavity Updated: 10/12/21 1855     COVID19 Detected    Narrative:      Fact sheet for providers: https://www.fda.gov/media/936989/download     Fact sheet for patients: https://www.fda.gov/media/341965/download    Test performed by PCR.    Consider negative results in combination with clinical observations, patient history, and epidemiological information.    Blood Culture - Blood, Arm, Left [[id number]]  (Normal) Collected: 10/12/21 1755    Lab Status: Final result Specimen: Blood from Arm, Left Updated: 10/17/21 1900     Blood Culture No growth at 5 days               Radiology:             Imaging Results (Last 72 Hours)     Procedure Component Value Units Date/Time    XR Abdomen KUB [[id number]] Collected: 10/18/21 1201     Updated: 10/18/21 1209    Narrative:      EXAM: XR ABDOMEN KUB- - 10/18/2021 10:43 AM CDT     HISTORY: OG placement; J96.01-Acute respiratory failure with hypoxia;  U07.1-COVID-19; U07.1-COVID-19; J12.82-Pneumonia  due to coronavirus  disease 2019       COMPARISON: No existing relevant imaging studies available.      TECHNIQUE:  1 images.  Supine portable view the upper abdomen     FINDINGS:    See impression          Impression:      1. Limited exam. Gastric tube tip projects at the distal gastric body.  This report was finalized on 10/18/2021 12:05 by Dr Balbina Roth MD.    XR Chest 1 View [[id number]] Collected: 10/18/21 1148     Updated: 10/18/21 1157    Narrative:      EXAM: XR CHEST 1 VW- - 10/18/2021 10:43 AM CDT     HISTORY: ett; J96.01-Acute respiratory failure with hypoxia;  U07.1-COVID-19; U07.1-COVID-19; J12.82-Pneumonia due to coronavirus  disease 2019       COMPARISON: 10/18/2021.      TECHNIQUE:  1 images.  Frontal view of the chest.     FINDINGS:    Endotracheal tube tip projects in mid trachea. Right central venous  catheter tip projects at lower SVC.     No pneumothorax or large pleural effusion. Bilateral pulmonary  opacities.     Cardiac and mediastinal silhouette appear within normal limits. No acute  bony finding on portable radiograph.          Impression:      1. Bilateral pulmonary opacities, which could represent edema, infection  or ARDS. These are similar compared to earlier the same day, increased  compared to 10/12/2021.  2. Lines and tubes as above.  This report was finalized on 10/18/2021 11:54 by Dr Balbina Roth MD.    XR Chest 1 View [[id number]] Collected: 10/18/21 0730     Updated: 10/18/21 0733    Narrative:      EXAMINATION: XR CHEST 1 VW-  10/18/2021 7:30 AM CDT 1 view     HISTORY: Respiratory Decline; J96.01-Acute respiratory failure with  hypoxia; U07.1-COVID-19     COMPARISON: 10/12/2021.     FINDINGS:   Consolidative change in the RIGHT mid to lower lung is slightly worsened  since the previous study. Patchy consolidations in the LEFT mid and  lower lung are also slightly worse since the prior study. The heart is  mildly enlarged.      The osseous structures and surrounding soft  tissues demonstrate no acute  abnormality.          Impression:         1.  Slight interval worsening of consolidative changes in the lungs.        This report was finalized on 10/18/2021 07:30 by Dr. Feliz Magdaleno MD.          Assessment/Plan     Active Hospital Problems    Diagnosis    • **Pneumonia due to COVID-19 virus    • KATYA (acute kidney injury) ([facility])    • Hyperkalemia    • Acute respiratory failure with hypercapnia ([facility])    • Type 2 diabetes mellitus with hyperglycemia, without long-term current use of insulin ([facility])    • Obesity, Class III, BMI 40-49.9 (morbid obesity) ([facility])    • Hyponatremia    • Acute respiratory distress syndrome (ARDS) due to 2019 novel coronavirus ([facility])    • Acute respiratory failure with hypoxia ([facility])        IMPRESSION:  COVID-19 infection with pneumonia-approximately day #17 from symptom onset and day #16 from positive test.    He did not receive monoclonal antibody therapy.  He did receive Tocilizumab October 13.    Acute respiratory failure with severe hypoxia.  Patient was intubated yesterday.  Has not been able to oxygenate well on maximum settings and in prone position.  Respiratory culture pending but Gram stain only remarkable for mixed gram-positive aida.  MRSA nasal screen negative.    Significant leukocytosis with bandemia.    Morbid obesity    New diagnosis of diabetes mellitus with hemoglobin A1c of 8.4    Elevated CRP-normalized then increased yesterday      RECOMMENDATION:   Continue dexamethasone  Follow respiratory culture  Continue enhanced droplet precautions  Ventilator management per pulmonary   VTE prophylaxis per attending  Diabetes management per attending  Adjuvant therapy per attending  Empiric antibiotic therapy    Discussed with MARINO Dash, Zo Spencer APRN Anita J. Fleenor-Ford, MD  10/19/21  08:11 CDT

## 2021-10-19 NOTE — PROGRESS NOTES
"Pharmacy Dosing Service  Automatic Renal Adjustment  Famotidine and Enoxaparin    Assessment/Action/Plan:  Orders placed to initiate HD  Famotidine changed to once daily (20 mg)   Lovenox changed to 30 mg subQ daily for VTE prophylaxis     Subjective:  Abebe Esquivel is a 40 y.o. male     Additional Factors Considered:  Patient disposition per documentation  Disease state or condition being treated    Objective:  Ht: 182.9 cm (72.01\"); Wt: (!) 147 kg (325 lb)  Creatinine   Date Value Ref Range Status   10/19/2021 2.59 (H) 0.76 - 1.27 mg/dL Final   10/18/2021 1.10 0.76 - 1.27 mg/dL Final   10/17/2021 1.03 0.76 - 1.27 mg/dL Final       Espinoza Lucero, PharmD  10/19/21 16:06 CDT    "

## 2021-10-19 NOTE — PROCEDURES
"Insert Central Line At Bedside    Date/Time: 10/19/2021 2:02 PM  Performed by: Malik Camacho MD  Authorized by: Malik Camacho MD   Consent: Verbal consent obtained. Written consent obtained.  Risks and benefits: risks, benefits and alternatives were discussed  Consent given by: parent  Patient understanding: patient states understanding of the procedure being performed  Patient consent: the patient's understanding of the procedure matches consent given  Procedure consent: procedure consent matches procedure scheduled  Required items: required blood products, implants, devices, and special equipment available  Patient identity confirmed: anonymous protocol, patient vented/unresponsive  Time out: Immediately prior to procedure a \"time out\" was called to verify the correct patient, procedure, equipment, support staff and site/side marked as required.  Preparation: skin prepped with ChloraPrep  Skin prep agent dried: skin prep agent completely dried prior to procedure  Sterile barriers: all five maximum sterile barriers used - cap, mask, sterile gown, sterile gloves, and large sterile sheet  Hand hygiene: hand hygiene performed prior to central venous catheter insertion  Location details: left internal jugular  Patient position: flat  Catheter type: triple lumen  Catheter size: 7 Fr (20 cm)  Pre-procedure: landmarks identified  Ultrasound guidance: yes  Sterile ultrasound techniques: sterile gel and sterile probe covers were used  Number of attempts: 1  Successful placement: yes  Post-procedure: line sutured and dressing applied  Assessment: blood return through all ports and free fluid flow  Patient tolerance: patient tolerated the procedure well with no immediate complications  Comments: Placed left IJ so that right IJ would be open for dialysis catheter.  Dialysis catheters much safer on right IJ due to anatomical course.        "

## 2021-10-19 NOTE — PROCEDURES
"Insert Central Line (VasCath) At Bedside    Date/Time: 10/19/2021 2:04 PM  Performed by: Malik Camacho MD  Authorized by: Malik Camacho MD   Consent: Verbal consent obtained. Written consent obtained.  Risks and benefits: risks, benefits and alternatives were discussed  Consent given by: parent  Patient understanding: patient states understanding of the procedure being performed  Patient consent: the patient's understanding of the procedure matches consent given  Procedure consent: procedure consent matches procedure scheduled  Relevant documents: relevant documents present and verified  Required items: required blood products, implants, devices, and special equipment available  Patient identity confirmed: anonymous protocol, patient vented/unresponsive  Time out: Immediately prior to procedure a \"time out\" was called to verify the correct patient, procedure, equipment, support staff and site/side marked as required.  Preparation: skin prepped with ChloraPrep  Skin prep agent dried: skin prep agent completely dried prior to procedure  Sterile barriers: all five maximum sterile barriers used - cap, mask, sterile gown, sterile gloves, and large sterile sheet  Hand hygiene: hand hygiene performed prior to central venous catheter insertion  Location details: right internal jugular  Patient position: flat  Catheter size: 11.5 Fr (16 cm)  Pre-procedure: landmarks identified  Ultrasound guidance: yes  Sterile ultrasound techniques: sterile gel and sterile probe covers were used  Number of attempts: 2  Successful placement: yes  Post-procedure: line sutured  Assessment: free fluid flow,  placement verified by x-ray and blood return through all ports  Patient tolerance: patient tolerated the procedure well with no immediate complications  Comments: Placed on right side due to anatomical course        "

## 2021-10-19 NOTE — CONSULTS
Nephrology ([facility]) Consult Note      Patient:  Abebe Esquivel  YOB: 1980  Date of Service: 10/19/2021  MRN: [id number]   Acct: [id number]   Primary Care Physician: Froylan Littlejohn DO  Advance Directive:   Code Status and Medical Interventions:   Ordered at: 10/12/21 5511     Level Of Support Discussed With:    Patient     Code Status:    CPR     Medical Interventions (Level of Support Prior to Arrest):    Full     Admit Date: 10/12/2021       Hospital Day: 7  Referring Provider: Harris Harden DO      Patient personally seen and examined.  Complete chart including Consults, Notes, Operative Reports, Labs, Cardiology, and Radiology studies reviewed as able.        Subjective:  Abebe Esquivel is a 40 y.o. male for whom we were consulted for evaluation and treatment of acute kidney injury.  No prior known renal issues. History of obesity.  Presented to [facility] ER on 10/12 with dyspnea after being diagnosed with COVID-19 as an outpatient several days earlier. Hospital course remarkable for progressive worsening of respiratory status. Ultimately requiring intubation on 10/18. Patient developed volume overload requiring diuretics, also was hypotensive over past 24 hours requiring Levophed. Currently patient is intubated and sedated. On Levophed and Diprivan drips.  Urine output has been declining    Allergies:  Patient has no known allergies.    Home Meds:  Medications Prior to Admission   Medication Sig Dispense Refill Last Dose   • acetaminophen (TYLENOL) 325 MG tablet Take 650 mg by mouth Every 6 (Six) Hours As Needed for Mild Pain .   10/12/2021 at Unknown time   • DM-Doxylamine-Acetaminophen (NYQUIL COLD & FLU PO) Take 1 dose by mouth Every Night.   10/12/2021 at Unknown time   • guaiFENesin (MUCINEX) 600 MG 12 hr tablet Take 1,200 mg by mouth 2 (Two) Times a Day.   10/12/2021 at Unknown time   • Pseudoephedrine-APAP-DM (DAYQUIL MULTI-SYMPTOM COLD/FLU PO) Take  1 dose by mouth Daily.   10/12/2021 at Unknown time   • vitamin C (ASCORBIC ACID) 250 MG tablet Take 250 mg by mouth Daily.   10/12/2021 at Unknown time       Medicines:  Current Facility-Administered Medications   Medication Dose Route Frequency Provider Last Rate Last Admin   • acetaminophen (TYLENOL) tablet 650 mg  650 mg Oral Q4H PRN Harris Harden DO        Or   • acetaminophen (TYLENOL) suppository 650 mg  650 mg Rectal Q4H PRN Harris Harden DO       • albuterol sulfate HFA (PROVENTIL HFA;VENTOLIN HFA;PROAIR HFA) inhaler 2 puff  2 puff Inhalation 4x Daily - RT Harris Harden DO   2 puff at 10/19/21 1020   • artificial tears ophthalmic ointment   Both Eyes PRN Raza Gonzalez MD       • ascorbic acid (VITAMIN C) tablet 500 mg  500 mg Nasogastric Daily Malik Navarro DO   500 mg at 10/19/21 1028   • atorvastatin (LIPITOR) tablet 20 mg  20 mg Nasogastric Nightly Malik Navarro DO   20 mg at 10/18/21 2059   • benzonatate (TESSALON) capsule 200 mg  200 mg Oral TID PRN Harris Harden DO   200 mg at 10/16/21 0108   • chlorhexidine (PERIDEX) 0.12 % solution 15 mL  15 mL Mouth/Throat Q12H Malik Navarro DO   15 mL at 10/18/21 2001   • cholecalciferol (VITAMIN D3) tablet 2,000 Units  2,000 Units Nasogastric Daily Malik Navarro DO   2,000 Units at 10/19/21 1028   • dexamethasone (DECADRON) injection 6 mg  6 mg Intravenous Daily Zo Ward APRN   6 mg at 10/19/21 1028   • dextromethorphan polistirex ER (DELSYM) 30 MG/5ML oral suspension 60 mg  60 mg Oral BID PRN Dustin Zuniga MD   60 mg at 10/17/21 1420   • dextrose (D50W) (25 g/50 mL) IV injection 25 g  25 g Intravenous Q15 Min PRN Malik Navarro DO       • dextrose (GLUTOSE) oral gel 15 g  15 g Oral Q15 Min PRN Malik Navarro, DO       • enoxaparin (LOVENOX) syringe 40 mg  40 mg Subcutaneous Q12H Harris Harden DO   40 mg at 10/19/21 1042   • famotidine (PEPCID) injection 20 mg  20 mg Intravenous Q12H Malik Navarro, DO    20 mg at 10/19/21 1029   • fentaNYL 2500 mcg/250 mL NS infusion   mcg/hr Intravenous Titrated Malik Navarro DO 30 mL/hr at 10/19/21 0521 300 mcg/hr at 10/19/21 0521   • glucagon (human recombinant) (GLUCAGEN DIAGNOSTIC) injection 1 mg  1 mg Subcutaneous Q15 Min PRN Malik Navarro, DO       • guaiFENesin (ROBITUSSIN) 100 MG/5ML oral solution 400 mg  400 mg Nasogastric Q6H Malik Navarro, DO   400 mg at 10/19/21 0521   • insulin detemir (LEVEMIR) injection 20 Units  20 Units Subcutaneous Q12H Dustin Zuniga MD   20 Units at 10/18/21 2002   • insulin regular (humuLIN R,novoLIN R) injection 0-24 Units  0-24 Units Subcutaneous Q6H Malik Navarro, DO   20 Units at 10/19/21 0520   • insulin regular (humuLIN R,novoLIN R) injection 5 Units  5 Units Subcutaneous Q6H Malik Navarro DO       • labetalol (NORMODYNE,TRANDATE) injection 10 mg  10 mg Intravenous Q6H PRN Malik Navarro DO       • lactated ringers bolus 500 mL  500 mL Intravenous Once Malik Navarro DO 1,000 mL/hr at 10/19/21 1037 500 mL at 10/19/21 1037   • lactated ringers infusion  75 mL/hr Intravenous Continuous Malik Navarro DO 75 mL/hr at 10/19/21 1043 75 mL/hr at 10/19/21 1043   • meropenem (MERREM) 1 g in sodium chloride 0.9 % 100 mL IVPB-VTB  1 g Intravenous Once Marguerite Dick MD       • meropenem (MERREM) 1 g in sodium chloride 0.9 % 100 mL IVPB-VTB  1 g Intravenous Q8H Marguerite Dick MD       • norepinephrine (LEVOPHED) 8 mg in 250 mL NS infusion (premix)  0.02-0.3 mcg/kg/min Intravenous Titrated Malik Navarro DO 17.55 mL/hr at 10/19/21 1046 0.06 mcg/kg/min at 10/19/21 1046   • ondansetron (ZOFRAN) injection 4 mg  4 mg Intravenous Q6H PRN Harris Harden DO       • Pharmacy Consult - Pharmacy to dose   Does not apply Continuous PRN Marguerite Dick MD       • propofol (DIPRIVAN) infusion 10 mg/mL 100 mL  5-75 mcg/kg/min Intravenous Titrated Malik Navarro DO 70.2 mL/hr at 10/19/21 1045 75 mcg/kg/min  at 10/19/21 1045   • sennosides-docusate (PERICOLACE) 8.6-50 MG per tablet 2 tablet  2 tablet Oral Nightly PRN Dustin Zungia MD       • sodium chloride 0.9 % flush 10 mL  10 mL Intravenous Q12H Harris Harden DO   10 mL at 10/19/21 1041   • sodium chloride 0.9 % flush 10 mL  10 mL Intravenous PRN Harris Harden DO       • sodium chloride nasal spray 1 spray  1 spray Each Nare PRN Dustin Zuniga MD   1 spray at 10/17/21 1418   • vecuronium (NORCURON) 100 mg in sodium chloride 0.9 % 100 mL (1 mg/mL) infusion  0.5 mcg/kg/min Intravenous Continuous Malik Navarro DO   Stopped at 10/19/21 0828   • zinc sulfate (ZINCATE) capsule 220 mg  220 mg Nasogastric Daily Malik Navarro DO   220 mg at 10/19/21 1040       Past Medical History:  History reviewed. No pertinent past medical history.    Past Surgical History:  History reviewed. No pertinent surgical history.    Family History  History reviewed. No pertinent family history.    Social History  Social History     Socioeconomic History   • Marital status: Single   Tobacco Use   • Smoking status: Never Smoker   • Smokeless tobacco: Never Used   Vaping Use   • Vaping Use: Never used   Substance and Sexual Activity   • Alcohol use: Not Currently   • Drug use: Never   • Sexual activity: Defer         Review of Systems:   unable to obtain due to intubated and sedated.      Objective:  Patient Vitals for the past 24 hrs:   BP Temp Temp src Pulse Resp SpO2 Weight   10/19/21 1020 -- -- -- 99 (!) 32 (!) 84 % --   10/19/21 0900 114/64 -- -- 104 -- 91 % --   10/19/21 0845 105/60 -- -- 104 -- 90 % --   10/19/21 0830 98/56 98 °F (36.7 °C) Axillary 104 (!) 33 (!) 89 % --   10/19/21 0815 99/55 -- -- 104 -- (!) 89 % --   10/19/21 0800 101/56 -- -- 105 -- (!) 89 % --   10/19/21 0745 102/56 -- -- 106 -- (!) 89 % --   10/19/21 0730 101/57 -- -- 106 -- (!) 89 % --   10/19/21 0715 102/59 -- -- 105 -- (!) 88 % --   10/19/21 0704 -- -- -- 105 (!) 32 90 % --   10/19/21 0700 111/60  -- -- 105 -- 90 % --   10/19/21 0600 109/67 -- -- 100 -- 91 % --   10/19/21 0529 -- -- -- 103 -- 92 % --   10/19/21 0528 -- -- -- 102 -- 92 % --   10/19/21 0520 -- -- -- 100 -- 94 % --   10/19/21 0515 109/61 -- -- 101 -- 93 % --   10/19/21 0500 96/61 -- -- 102 -- 92 % --   10/19/21 0413 -- -- -- -- -- -- (!) 147 kg (325 lb)   10/19/21 0400 94/59 98 °F (36.7 °C) Axillary 103 -- (!) 89 % --   10/19/21 0300 97/60 -- -- 103 -- (!) 88 % --   10/19/21 0245 -- -- -- 102 (!) 32 (!) 87 % --   10/19/21 0230 (!) 88/58 -- -- 104 -- (!) 88 % --   10/19/21 0215 92/60 -- -- 104 -- (!) 88 % --   10/19/21 0200 91/61 -- -- 104 -- (!) 88 % --   10/19/21 0100 98/62 -- -- 105 -- (!) 87 % --   10/19/21 0030 104/64 -- -- 105 -- (!) 86 % --   10/19/21 0000 97/64 -- -- 105 -- (!) 84 % --   10/18/21 2330 98/65 97.9 °F (36.6 °C) Axillary 104 -- (!) 84 % --   10/18/21 2300 91/64 -- -- 104 -- 91 % --   10/18/21 2240 -- -- -- 105 (!) 32 91 % --   10/18/21 2230 102/67 -- -- 105 -- 91 % --   10/18/21 2200 94/63 -- -- 107 -- 91 % --   10/18/21 2100 111/73 -- -- 104 -- 91 % --   10/18/21 2030 101/64 -- -- 107 -- 91 % --   10/18/21 2000 (!) 85/56 -- -- 111 -- 90 % --   10/18/21 1930 113/63 97.9 °F (36.6 °C) Axillary 112 -- 91 % --   10/18/21 1920 -- -- -- 107 -- 91 % --   10/18/21 1900 102/65 -- -- 108 -- 91 % --   10/18/21 1826 -- -- -- 107 (!) 32 91 % --   10/18/21 1800 107/67 -- -- 108 -- 90 % --   10/18/21 1700 97/65 -- -- 105 -- 90 % --   10/18/21 1600 -- -- -- 108 -- (!) 83 % --   10/18/21 1507 -- -- -- 112 (!) 32 (!) 88 % --   10/18/21 1500 96/61 -- -- 109 -- (!) 88 % --   10/18/21 1400 98/64 -- -- 113 -- (!) 89 % --   10/18/21 1345 94/69 -- -- 112 -- (!) 89 % --   10/18/21 1330 93/67 -- -- 111 -- 90 % --   10/18/21 1315 (!) 78/58 -- -- 116 -- 90 % --   10/18/21 1300 95/77 -- -- 110 -- 91 % --   10/18/21 1245 (!) 74/61 -- -- 119 -- 90 % --   10/18/21 1230 -- -- -- 119 -- (!) 89 % --   10/18/21 1215 -- -- -- (!) 121 -- (!) 88 % --   10/18/21  1200 93/68 -- -- (!) 130 -- (!) 84 % --   10/18/21 1145 -- -- -- 110 -- (!) 83 % --   10/18/21 1130 -- -- -- (!) 136 -- (!) 86 % --   10/18/21 1115 -- -- -- (!) 140 -- (!) 86 % --   10/18/21 1100 -- -- -- (!) 131 -- (!) 81 % --       Intake/Output Summary (Last 24 hours) at 10/19/2021 1048  Last data filed at 10/19/2021 1043  Gross per 24 hour   Intake 2833.95 ml   Output 550 ml   Net 2283.95 ml     General: intubated and sedated  Chest:  equal chest rise  CVS: regular rate and rhythm  Abdominal: soft, nontender, positive bowel sounds  Extremities: no cyanosis or edema  Skin: warm and dry without rash      Labs:  Results from last 7 days   Lab Units 10/19/21  0326 10/18/21  0419 10/17/21  0426   WBC 10*3/mm3 32.08* 13.32* 10.81*   HEMOGLOBIN g/dL 16.2 15.9 15.0   HEMATOCRIT % 49.0 48.4 45.6   PLATELETS 10*3/mm3 175 204 261         Results from last 7 days   Lab Units 10/19/21  0326 10/18/21  0419 10/17/21  0426   SODIUM mmol/L 131* 136 137   POTASSIUM mmol/L 6.6* 4.9 3.8   CHLORIDE mmol/L 96* 100 103   CO2 mmol/L 23.0 25.0 23.0   BUN mg/dL 43* 24* 28*   CREATININE mg/dL 2.59* 1.10 1.03   CALCIUM mg/dL 7.7* 8.9 8.6   BILIRUBIN mg/dL 1.1 0.7 0.6   ALK PHOS U/L 120* 100 88   ALT (SGPT) U/L 33 37 28   AST (SGOT) U/L 44* 44* 37   GLUCOSE mg/dL 349* 226* 219*       Radiology:   Imaging Results (Last 72 Hours)     Procedure Component Value Units Date/Time    XR Abdomen KUB [[id number]] Collected: 10/18/21 1201     Updated: 10/18/21 1209    Narrative:      EXAM: XR ABDOMEN KUB- - 10/18/2021 10:43 AM CDT     HISTORY: OG placement; J96.01-Acute respiratory failure with hypoxia;  U07.1-COVID-19; U07.1-COVID-19; J12.82-Pneumonia due to coronavirus  disease 2019       COMPARISON: No existing relevant imaging studies available.      TECHNIQUE:  1 images.  Supine portable view the upper abdomen     FINDINGS:    See impression          Impression:      1. Limited exam. Gastric tube tip projects at the distal gastric body.  This  report was finalized on 10/18/2021 12:05 by Dr Balbina Roth MD.    XR Chest 1 View [[id number]] Collected: 10/18/21 1148     Updated: 10/18/21 1157    Narrative:      EXAM: XR CHEST 1 VW- - 10/18/2021 10:43 AM CDT     HISTORY: ett; J96.01-Acute respiratory failure with hypoxia;  U07.1-COVID-19; U07.1-COVID-19; J12.82-Pneumonia due to coronavirus  disease 2019       COMPARISON: 10/18/2021.      TECHNIQUE:  1 images.  Frontal view of the chest.     FINDINGS:    Endotracheal tube tip projects in mid trachea. Right central venous  catheter tip projects at lower SVC.     No pneumothorax or large pleural effusion. Bilateral pulmonary  opacities.     Cardiac and mediastinal silhouette appear within normal limits. No acute  bony finding on portable radiograph.          Impression:      1. Bilateral pulmonary opacities, which could represent edema, infection  or ARDS. These are similar compared to earlier the same day, increased  compared to 10/12/2021.  2. Lines and tubes as above.  This report was finalized on 10/18/2021 11:54 by Dr Balbina Roth MD.    XR Chest 1 View [[id number]] Collected: 10/18/21 0730     Updated: 10/18/21 0733    Narrative:      EXAMINATION: XR CHEST 1 VW-  10/18/2021 7:30 AM CDT 1 view     HISTORY: Respiratory Decline; J96.01-Acute respiratory failure with  hypoxia; U07.1-COVID-19     COMPARISON: 10/12/2021.     FINDINGS:   Consolidative change in the RIGHT mid to lower lung is slightly worsened  since the previous study. Patchy consolidations in the LEFT mid and  lower lung are also slightly worse since the prior study. The heart is  mildly enlarged.      The osseous structures and surrounding soft tissues demonstrate no acute  abnormality.          Impression:         1.  Slight interval worsening of consolidative changes in the lungs.        This report was finalized on 10/18/2021 07:30 by Dr. Feliz Magdaleno MD.          Culture:  Blood Culture   Date Value Ref Range Status   10/12/2021 No  growth at 5 days  Final   10/12/2021 No growth at 5 days  Final     Respiratory Culture   Date Value Ref Range Status   10/18/2021   Preliminary    Light growth (2+) Normal Respiratory Annabel: NO S.aureus/MRSA or Pseudomonas aeruginosa         Assessment   1.  Acute kidney injury, ATN  2.  Hyperkalemia  3.  COVID-19 pneumonia  4.  Acute respiratory failure--intubated   5.  Type 2 diabetes  6.  Obesity  7.  Hyponatremia     Plan:  1.  Repeat potassium level pending  2.  Renal US and urine studies pending  3.  Continue gentle IV fluids  4.  Will add Lokelma 10 gm TID for three doses via NGT  5.  May need to consider dialysis soon given rate of renal function decline and hyperkalemia. Monitor labs closely  6.  Further assessment and plan pending Dr Iglesias's evaluation of patient      Thank you for the consult, we appreciate the opportunity to provide care to your patients.  Feel free to contact me if I can be of any further assistance.      Ryan Yip, APRN  10/19/2021  10:48 CDT

## 2021-10-20 PROBLEM — K72.00 SHOCK LIVER: Status: ACTIVE | Noted: 2021-01-01

## 2021-10-20 NOTE — PROGRESS NOTES
PULMONARY AND CRITICAL CARE PROGRESS NOTE - [facility]    Patient: Abebe Esquivel    1980    MR# [id number]    Acct# [id number]  10/20/21   08:35 CDT  Referring Provider: Malik Navarro DO    Chief Complaint: Mechanically ventilated    Interval history: The patient was seen from outside the glass to preserve PPE and limit exposure. He was intubated on 10-18-21. He remains intubated and sedated on propofol and fentanyl. He is off vecuronium drip. Oxygen saturation 80% on peep of 15 and fio2 1.0. ETco2 33. Plateau pressure 31. ABG reviewed. Bicarbonate drip. Received dialysis treatment yesterday evening. Per nursing family has been notified of very poor prognosis. He is afebrile. He has been prone during the night, now supine.       Meds:  albuterol sulfate HFA, 2 puff, Inhalation, 4x Daily - RT  ascorbic acid, 500 mg, Nasogastric, Daily  atorvastatin, 20 mg, Nasogastric, Nightly  chlorhexidine, 15 mL, Mouth/Throat, Q12H  cholecalciferol, 2,000 Units, Nasogastric, Daily  dexamethasone, 10 mg, Intravenous, BID  enoxaparin, 30 mg, Subcutaneous, Q24H  famotidine, 20 mg, Intravenous, Daily  guaiFENesin, 400 mg, Nasogastric, Q6H  insulin detemir, 20 Units, Subcutaneous, Q12H  insulin regular, 0-24 Units, Subcutaneous, Q6H  insulin regular, 5 Units, Subcutaneous, Q6H  meropenem, 1 g, Intravenous, Q24H  sodium chloride, 10 mL, Intravenous, Q12H  zinc sulfate, 220 mg, Nasogastric, Daily      EPINEPHrine, 0.02-0.3 mcg/kg/min, Last Rate: Stopped (10/19/21 2310)  fentanyl 10 mcg/mL,  mcg/hr, Last Rate: 300 mcg/hr (10/20/21 0014)  lactated ringers, 75 mL/hr, Last Rate: 75 mL/hr (10/19/21 1849)  norepinephrine, 0.02-0.3 mcg/kg/min, Last Rate: 0.26 mcg/kg/min (10/20/21 0603)  Pharmacy Consult - Pharmacy to dose,   propofol, 5-75 mcg/kg/min, Last Rate: 75 mcg/kg/min (10/20/21 0804)  sodium bicarbonate drip (greater than 75 mEq/bag), 75 mEq, Last Rate: 75 mEq (10/20/21 0510)  vasopressin, 0.03  Units/min, Last Rate: 0.03 Units/min (10/20/21 0001)  vecuronium, 0.5 mcg/kg/min, Last Rate: Stopped (10/19/21 0828)      Review of Systems:   Cannot obtain due to mechanical ventilated state  Ventilator Settings:        Resp Rate (Set): 32  Pressure Support (cm H2O): 10 cm H20  FiO2 (%): 100 %  PEEP/CPAP (cm H2O): 15 cm H20  Minute Ventilation (L/min) (Obs): 14.3 L/min  Resp Rate (Observed) Vent: 32     I:E Ratio (Obs): 1:1.10  PIP Observed (cm H2O): 36 cm H2O     Physical Exam:  Temp:  [97.4 °F (36.3 °C)-99.7 °F (37.6 °C)] 99.1 °F (37.3 °C)  Heart Rate:  [] 112  Resp:  [32] 32  BP: ()/(31-86) 88/51  Arterial Line BP: ()/(35-72) 97/61  FiO2 (%):  [100 %] 100 %    Intake/Output Summary (Last 24 hours) at 10/20/2021 0835  Last data filed at 10/20/2021 0801  Gross per 24 hour   Intake 6821.37 ml   Output 50 ml   Net 6771.37 ml     SpO2 Percentage    10/20/21 0730 10/20/21 0745 10/20/21 0800   SpO2: (!) 80% (!) 80% (!) 80%      GENERAL/CONSTITUTIONAL: no acute distress.  Pt is on vent, multiple drips. obese  HEENT: atraumatic, normocephalic, OG   NOSE: normal  NECK: jugular veins nondistended  CHEST: no paradox, no retractions.   Vent synchrony: yes  CARDIAC: tachycardic rhythm, rate 116  ABDOMEN: nondistended  : menezes catheter   EXTREMITIES: not visible  NEURO:  sedated, mechanically ventilated  SKIN: no jaundice.  No rash   Results from last 7 days   Lab Units 10/20/21  0430 10/19/21  0326 10/18/21  0419   WBC 10*3/mm3 37.84* 32.08* 13.32*   HEMOGLOBIN g/dL 16.1 16.2 15.9   PLATELETS 10*3/mm3 195 175 204     Results from last 7 days   Lab Units 10/20/21  0655 10/19/21  0326 10/18/21  0419 10/18/21  0419 10/17/21  6956 10/17/21  4806   SODIUM mmol/L 130* 131*  --  136  --    < >   POTASSIUM mmol/L 7.6* 6.6*  --  4.9  --    < >   BUN mg/dL 37* 43*  --  24*  --    < >   CREATININE mg/dL 4.88* 2.59*  --  1.10  --    < >   CRP mg/dL 2.97* 2.13*   < > 0.39  --    < >   PROCALCITONIN ng/mL  --  0.94*  --    --  0.14  --    FERRITIN ng/mL 2,717.00* 1,608.00*   < >  --  1,582.00*  --     < > = values in this interval not displayed.     Results from last 7 days   Lab Units 10/20/21  0345 10/19/21  0307 10/18/21  1510   PH, ARTERIAL pH units 6.926* 7.116* 7.113*   PCO2, ARTERIAL mm Hg >102.0* 79.3* 85.7*   PO2 ART mm Hg 55.2* 60.9* 73.0*   FIO2 % 100 100 100     Blood Culture   Date Value Ref Range Status   10/12/2021 No growth at 5 days  Final   10/12/2021 No growth at 5 days  Final     Recent films:  XR Chest 1 View    Result Date: 10/19/2021   1.  Endotracheal tube is now 4 cm above the sandro. 2.  No significant change in bilateral dense consolidation and diffuse interstitial opacity. This report was finalized on 10/19/2021 16:32 by Dr. Ben Lara MD.    XR Chest 1 View    Result Date: 10/19/2021  1. Retraction of the endotracheal tube tip near the thoracic inlet. Consider advancing the tube approximately 3 cm. There is a new left internal jugular central line, which appears in good position. The right internal jugular central line appears in good position. No pneumothorax is identified. The lungs are essentially unchanged, with extensive infiltrates bilaterally. This report was finalized on 10/19/2021 15:01 by Dr. Kentrell Michel MD.    XR Chest 1 View    Result Date: 10/18/2021  1. Bilateral pulmonary opacities, which could represent edema, infection or ARDS. These are similar compared to earlier the same day, increased compared to 10/12/2021. 2. Lines and tubes as above. This report was finalized on 10/18/2021 11:54 by Dr Balbina Roth MD.    XR Abdomen KUB    Result Date: 10/18/2021  1. Limited exam. Gastric tube tip projects at the distal gastric body. This report was finalized on 10/18/2021 12:05 by Dr Balbina Roth MD.    Films reviewed personally by me.  My interpretation: None today 10-20-21  Pulmonary Assessment:  Acute respiratory failure with hypoxia due to Covid-19  Unvaccinated status, did not  receive Regeneron  S/P Tocilizumab   Obesity   Leukocytosis   Acute kidney injury  Hyperglycemia    Recommend:   ETT D#3. Continue current vent settings. ACVC rate 32, tv 450, peep 15, fio2 1.0. Titrate oxygen for saturation 90%  Repeat ABG and cxr intermittently  Continue dvt prophylaxis, lovenox  Continue stress ulcer prophylaxis while on vent, pepcid  Proning candidate: yes  Dexamethasone day 8 of 10, increased to 10mg twice daily on 10-19  Limit paralytics  Continue albuterol hfa and guaifenesin   Antibiotic: Meropenem D#2- ID following   Nephrology consult pending  Code status: Full  Enteral feeding as ordered  Prognosis: very poor       Electronically signed by CESIA Manuel on 10/20/2021 at 08:35 CDT     Physician substantive portion:  O2 sat 80 this am despite maximal intervention.  Pt sedated.  No resp paradox.  Abd obese.  Course of events in past day reviewed. Dramatic deterioration despite maximal vent, RRT, bicarbonate tx for refractory acidosis.  Pt moribund, expect death.  Nothing else to offer.    I have seen and examined patient personally, performing a face-to-face diagnostic evaluation with plan of care reviewed and developed with APRN and nursing staff. I have addended and/or modified the above history of present illness, physical examination, and assessment and plan to reflect my findings and impressions. Essential elements of the care plan were discussed with APRN above.  Agree with findings and assessment/plan as documented above.    Electronically signed by Raza Gonzalez MD, on 10/20/2021, 15:20 CDT

## 2021-10-20 NOTE — PROGRESS NOTES
Nephrology ([facility]) Progress Note      Patient:  Abebe Esquivel  YOB: 1980  Date of Service: 10/20/2021  MRN: [id number]   Acct: [id number]   Primary Care Physician: Froylan Littlejohn DO  Advance Directive:   Code Status and Medical Interventions:   Ordered at: 10/20/21 0824     Level Of Support Discussed With:    Health Care Surrogate     Code Status:    No CPR     Medical Interventions (Level of Support Prior to Arrest):    Full     Comments:    His mother has made him a no CPR this morning. Witnessed by the charge nurse, Teresa Moralez.     Admit Date: 10/12/2021       Hospital Day: 8  Referring Provider: Harris Harden DO      Patient personally seen and examined.  Complete chart including Consults, Notes, Operative Reports, Labs, Cardiology, and Radiology studies reviewed as able.        Subjective:  Abebe Esquivel is a 40 y.o. male for whom we were consulted for evaluation and treatment of acute kidney injury.  No prior known renal issues. History of obesity.  Presented to [facility] ER on 10/12 with dyspnea after being diagnosed with COVID-19 as an outpatient several days earlier. Hospital course remarkable for progressive worsening of respiratory status. Ultimately requiring intubation on 10/18. Patient developed volume overload requiring diuretics, also was hypotensive  requiring Levophed. Renal function and urine output continued to decline, also developed sever hyperkalemia. Temporary vascath placed on 10/19 and HD was initiated same day    Today remains intubated, on multiple pressors. Severe hyperkalemia and acidosis despite RRT.    Allergies:  Patient has no known allergies.    Home Meds:  Medications Prior to Admission   Medication Sig Dispense Refill Last Dose   • acetaminophen (TYLENOL) 325 MG tablet Take 650 mg by mouth Every 6 (Six) Hours As Needed for Mild Pain .   10/12/2021 at Unknown time   • DM-Doxylamine-Acetaminophen (NYQUIL COLD & FLU PO)  Take 1 dose by mouth Every Night.   10/12/2021 at Unknown time   • guaiFENesin (MUCINEX) 600 MG 12 hr tablet Take 1,200 mg by mouth 2 (Two) Times a Day.   10/12/2021 at Unknown time   • Pseudoephedrine-APAP-DM (DAYQUIL MULTI-SYMPTOM COLD/FLU PO) Take 1 dose by mouth Daily.   10/12/2021 at Unknown time   • vitamin C (ASCORBIC ACID) 250 MG tablet Take 250 mg by mouth Daily.   10/12/2021 at Unknown time       Medicines:  Current Facility-Administered Medications   Medication Dose Route Frequency Provider Last Rate Last Admin   • acetaminophen (TYLENOL) tablet 650 mg  650 mg Oral Q4H PRN Harris Harden DO        Or   • acetaminophen (TYLENOL) suppository 650 mg  650 mg Rectal Q4H PRN Harris Harden DO       • albuterol sulfate HFA (PROVENTIL HFA;VENTOLIN HFA;PROAIR HFA) inhaler 2 puff  2 puff Inhalation 4x Daily - RT Harris Harden DO   2 puff at 10/20/21 0640   • artificial tears ophthalmic ointment   Both Eyes PRN Raza Gonzalez MD       • ascorbic acid (VITAMIN C) tablet 500 mg  500 mg Nasogastric Daily Malik Navarro DO   500 mg at 10/20/21 0818   • atorvastatin (LIPITOR) tablet 20 mg  20 mg Nasogastric Nightly Malik Navarro DO   20 mg at 10/19/21 2111   • benzonatate (TESSALON) capsule 200 mg  200 mg Oral TID PRN Harris Harden DO   200 mg at 10/16/21 0108   • chlorhexidine (PERIDEX) 0.12 % solution 15 mL  15 mL Mouth/Throat Q12H Malik Navarro DO   15 mL at 10/20/21 0819   • cholecalciferol (VITAMIN D3) tablet 2,000 Units  2,000 Units Nasogastric Daily Malik Navarro DO   2,000 Units at 10/20/21 0818   • dexamethasone sodium phosphate injection 10 mg  10 mg Intravenous BID Zo Ward APRN   10 mg at 10/20/21 0819   • dextromethorphan polistirex ER (DELSYM) 30 MG/5ML oral suspension 60 mg  60 mg Oral BID PRN Dustin Zuniga MD   60 mg at 10/17/21 1420   • dextrose (D50W) (25 g/50 mL) IV injection 25 g  25 g Intravenous Q15 Min PRN Malik Navarro DO       • dextrose  (GLUTOSE) oral gel 15 g  15 g Oral Q15 Min PRN Malik Navarro DO       • DIALYSIS USE ONLY - heparin (porcine) injection 3,200 Units  3,200 Units Intracatheter PRN Manjeet Iglesias MD   3,200 Units at 10/19/21 1910   • enoxaparin (LOVENOX) syringe 30 mg  30 mg Subcutaneous Q24H Malik Navarro, DO   30 mg at 10/20/21 0925   • EPINEPHrine (ADRENALIN) 5 mg in sodium chloride 0.9 % 250 mL infusion  0.02-0.3 mcg/kg/min Intravenous Titrated Zac Knox MD   Held at 10/19/21 2310   • famotidine (PEPCID) injection 20 mg  20 mg Intravenous Daily Malik Navarro DO   20 mg at 10/20/21 0817   • fentaNYL 2500 mcg/250 mL NS infusion   mcg/hr Intravenous Titrated Malik Navarro DO 30 mL/hr at 10/20/21 0817 300 mcg/hr at 10/20/21 0817   • glucagon (human recombinant) (GLUCAGEN DIAGNOSTIC) injection 1 mg  1 mg Subcutaneous Q15 Min PRN Malik Navarro DO       • guaiFENesin (ROBITUSSIN) 100 MG/5ML oral solution 400 mg  400 mg Nasogastric Q6H Malik Navarro DO   400 mg at 10/20/21 0603   • hydrocortisone sodium succinate (Solu-CORTEF) injection 100 mg  100 mg Intravenous Once Mailk Camacho MD       • insulin detemir (LEVEMIR) injection 20 Units  20 Units Subcutaneous Q12H Dustin Zuniga MD   20 Units at 10/20/21 0817   • insulin regular (humuLIN R,novoLIN R) injection 0-24 Units  0-24 Units Subcutaneous Q6H Malik Navarro DO   12 Units at 10/20/21 0603   • insulin regular (humuLIN R,novoLIN R) injection 5 Units  5 Units Subcutaneous Q6H Malik Navarro DO   5 Units at 10/20/21 0604   • labetalol (NORMODYNE,TRANDATE) injection 10 mg  10 mg Intravenous Q6H PRN Malik Navarro DO       • lactated ringers infusion  75 mL/hr Intravenous Continuous Malik Navarro DO 75 mL/hr at 10/19/21 1849 75 mL/hr at 10/19/21 1849   • meropenem (MERREM) 1 g in sodium chloride 0.9 % 100 mL IVPB-VTB  1 g Intravenous Q24H Marguerite Dick MD   1 g at 10/19/21 2112   • norepinephrine (LEVOPHED) 8 mg in 250 mL NS  infusion (premix)  0.02-0.3 mcg/kg/min Intravenous Titrated Malik Navarro DO 87.8 mL/hr at 10/20/21 0942 0.3 mcg/kg/min at 10/20/21 0942   • ondansetron (ZOFRAN) injection 4 mg  4 mg Intravenous Q6H PRN Harris Harden DO       • Pharmacy Consult - Pharmacy to dose   Does not apply Continuous PRN Marguerite Dick MD       • propofol (DIPRIVAN) infusion 10 mg/mL 100 mL  5-75 mcg/kg/min Intravenous Titrated Malik Navarro DO 70.2 mL/hr at 10/20/21 0923 75 mcg/kg/min at 10/20/21 0923   • sennosides-docusate (PERICOLACE) 8.6-50 MG per tablet 2 tablet  2 tablet Oral Nightly PRN Dustin Zuniga MD       • sodium bicarbonate 8.4 % 75 mEq in sodium chloride 0.45 % 1,000 mL infusion (greater than 75 mEq)  75 mEq Intravenous Continuous Araceli Mcknight APRN 75 mL/hr at 10/20/21 0510 75 mEq at 10/20/21 0510   • sodium chloride 0.9 % flush 10 mL  10 mL Intravenous Q12H Harris Harden DO   10 mL at 10/20/21 0818   • sodium chloride 0.9 % flush 10 mL  10 mL Intravenous PRN Harris Harden DO       • sodium chloride nasal spray 1 spray  1 spray Each Nare PRN Dustin Zuniga MD   1 spray at 10/17/21 1418   • Vasopressin (PITRESSIN) 20 Units in sodium chloride 0.9 % 100 mL infusion  0.04 Units/min Intravenous Continuous Malik Navarro DO 9 mL/hr at 10/20/21 0858 0.03 Units/min at 10/20/21 0858   • vecuronium (NORCURON) 100 mg in sodium chloride 0.9 % 100 mL (1 mg/mL) infusion  0.5 mcg/kg/min Intravenous Continuous Malik Navarro DO   Stopped at 10/19/21 0828   • zinc sulfate (ZINCATE) capsule 220 mg  220 mg Nasogastric Daily Malik Navarro DO   220 mg at 10/20/21 0819       Past Medical History:  History reviewed. No pertinent past medical history.    Past Surgical History:  History reviewed. No pertinent surgical history.    Family History  History reviewed. No pertinent family history.    Social History  Social History     Socioeconomic History   • Marital status: Single   Tobacco Use   • Smoking  status: Never Smoker   • Smokeless tobacco: Never Used   Vaping Use   • Vaping Use: Never used   Substance and Sexual Activity   • Alcohol use: Not Currently   • Drug use: Never   • Sexual activity: Defer         Review of Systems:  Unable to obtain due to intubated and sedated    Objective:  Patient Vitals for the past 24 hrs:   BP Temp Temp src Pulse Resp SpO2 Weight   10/20/21 0800 -- 99.1 °F (37.3 °C) Axillary 112 -- (!) 80 % --   10/20/21 0745 (!) 88/51 -- -- 119 -- (!) 80 % --   10/20/21 0730 (!) 89/63 -- -- 111 -- (!) 80 % --   10/20/21 0715 (!) 87/59 -- -- (!) 122 -- (!) 80 % --   10/20/21 0700 (!) 89/45 -- -- 120 -- (!) 80 % --   10/20/21 0645 (!) 88/55 -- -- (!) 121 -- (!) 80 % --   10/20/21 0640 -- -- -- 116 (!) 32 (!) 81 % --   10/20/21 0630 (!) 84/57 -- -- 115 -- (!) 81 % --   10/20/21 0615 (!) 72/36 -- -- 114 -- (!) 80 % --   10/20/21 0600 (!) 78/64 -- -- (!) 129 -- (!) 80 % --   10/20/21 0545 (!) 89/57 -- -- (!) 121 -- (!) 81 % --   10/20/21 0530 (!) 88/64 -- -- 120 -- (!) 81 % --   10/20/21 0515 (!) 88/34 -- -- (!) 129 -- (!) 81 % --   10/20/21 0500 (!) 85/54 -- -- (!) 129 -- (!) 82 % (!) 158 kg (347 lb 8 oz)   10/20/21 0445 (!) 89/31 -- -- (!) 129 -- (!) 82 % --   10/20/21 0430 102/66 99.7 °F (37.6 °C) Axillary (!) 130 -- (!) 82 % --   10/20/21 0415 94/65 -- -- (!) 121 -- (!) 82 % --   10/20/21 0400 97/62 -- -- 117 -- (!) 82 % --   10/20/21 0345 100/61 -- -- 115 -- (!) 81 % --   10/20/21 0330 95/59 -- -- 117 -- (!) 81 % --   10/20/21 0315 95/58 -- -- 116 -- (!) 79 % --   10/20/21 0300 -- -- -- 115 -- (!) 86 % --   10/20/21 0245 114/68 -- -- (!) 126 -- (!) 78 % --   10/20/21 0235 -- -- -- (!) 127 (!) 32 -- --   10/20/21 0230 111/69 -- -- (!) 126 -- (!) 77 % --   10/20/21 0215 108/73 -- -- (!) 126 -- (!) 79 % --   10/20/21 0200 102/62 -- -- (!) 127 -- (!) 78 % --   10/20/21 0145 109/70 -- -- (!) 128 -- (!) 79 % --   10/20/21 0130 104/74 -- -- (!) 122 -- (!) 78 % --   10/20/21 0115 116/73 -- -- 115 --  (!) 79 % --   10/20/21 0100 122/73 -- -- 114 -- (!) 82 % --   10/20/21 0045 132/86 -- -- 110 -- (!) 80 % --   10/20/21 0030 96/58 -- -- 112 -- (!) 81 % --   10/20/21 0015 90/53 -- -- 112 -- (!) 81 % --   10/20/21 0000 (!) 89/52 97.4 °F (36.3 °C) Axillary 115 -- (!) 81 % --   10/19/21 2330 95/51 -- -- 113 -- (!) 81 % --   10/19/21 2315 96/49 -- -- 110 -- (!) 81 % --   10/19/21 2300 (!) 88/46 -- -- 112 -- (!) 81 % --   10/19/21 2255 -- -- -- 103 -- (!) 81 % --   10/19/21 2245 91/52 -- -- 111 -- (!) 81 % --   10/19/21 2230 (!) 87/52 -- -- 110 -- (!) 80 % --   10/19/21 2215 96/50 -- -- (!) 121 -- (!) 81 % --   10/19/21 2213 -- -- -- 110 (!) 32 -- --   10/19/21 2200 96/50 -- -- 119 -- (!) 81 % --   10/19/21 2145 91/53 -- -- 109 -- (!) 80 % --   10/19/21 2130 90/50 -- -- 113 -- (!) 80 % --   10/19/21 2115 100/56 -- -- 117 -- (!) 79 % --   10/19/21 2100 94/56 97.6 °F (36.4 °C) Axillary 109 -- (!) 78 % --   10/19/21 2050 -- -- -- 110 -- (!) 76 % --   10/19/21 2045 (!) 75/46 -- -- 100 -- (!) 74 % --   10/19/21 2040 (!) 65/42 -- -- 98 -- (!) 73 % --   10/19/21 2035 -- -- -- 100 -- (!) 77 % --   10/19/21 2030 -- -- -- 102 -- (!) 83 % --   10/19/21 2015 95/58 -- -- 101 -- (!) 82 % --   10/19/21 2010 -- -- -- 101 (!) 32 (!) 82 % --   10/19/21 2000 (!) 89/56 -- -- 100 -- (!) 83 % --   10/19/21 1945 (!) 87/55 -- -- 98 -- (!) 83 % --   10/19/21 1930 (!) 85/57 -- -- 97 -- (!) 83 % --   10/19/21 1915 (!) 86/53 -- -- 97 -- (!) 82 % --   10/19/21 1900 (!) 84/53 -- -- 94 -- (!) 83 % --   10/19/21 1515 102/61 -- -- 102 -- (!) 80 % --   10/19/21 1500 98/60 -- -- 101 -- (!) 80 % --   10/19/21 1457 -- -- -- 101 (!) 32 (!) 80 % --   10/19/21 1445 94/62 -- -- 99 -- (!) 80 % --   10/19/21 1430 94/57 -- -- 99 -- (!) 80 % --   10/19/21 1415 94/58 -- -- 99 -- (!) 80 % --   10/19/21 1400 95/58 -- -- 100 -- (!) 79 % --   10/19/21 1345 94/55 -- -- 99 -- (!) 78 % --   10/19/21 1330 96/51 -- -- 98 -- (!) 80 % --   10/19/21 1315 100/56 -- -- 94 -- (!)  83 % --   10/19/21 1300 108/61 -- -- 95 -- (!) 86 % --   10/19/21 1245 111/66 -- -- 95 -- (!) 86 % --   10/19/21 1230 117/65 -- -- 92 -- (!) 86 % --   10/19/21 1215 102/63 -- -- 94 -- (!) 86 % --   10/19/21 1200 105/60 97.4 °F (36.3 °C) Axillary 94 (!) 32 (!) 86 % --   10/19/21 1145 99/59 -- -- 94 -- (!) 85 % --   10/19/21 1130 97/60 -- -- 95 -- (!) 85 % --   10/19/21 1115 97/56 -- -- 95 -- (!) 84 % --   10/19/21 1100 99/72 -- -- 96 -- (!) 85 % --   10/19/21 1045 99/55 -- -- 97 -- (!) 84 % --   10/19/21 1030 93/51 -- -- 98 -- (!) 84 % --   10/19/21 1020 -- -- -- 99 (!) 32 (!) 84 % --   10/19/21 1015 90/49 -- -- 101 -- (!) 84 % --   10/19/21 1000 102/54 -- -- 104 -- 90 % --       Intake/Output Summary (Last 24 hours) at 10/20/2021 0945  Last data filed at 10/20/2021 0801  Gross per 24 hour   Intake 6821.37 ml   Output 50 ml   Net 6771.37 ml     General: unresponsive/intubated  Chest:  equal chest rise  CVS: regular rate and rhythm  Abdominal: soft, nontender, positive bowel sounds  Extremities: no cyanosis or edema  Skin: warm and dry without rash      Labs:  Results from last 7 days   Lab Units 10/20/21  0430 10/19/21  0326 10/18/21  0419   WBC 10*3/mm3 37.84* 32.08* 13.32*   HEMOGLOBIN g/dL 16.1 16.2 15.9   HEMATOCRIT % 48.8 49.0 48.4   PLATELETS 10*3/mm3 195 175 204         Results from last 7 days   Lab Units 10/20/21  0655 10/19/21  0326 10/18/21  0419   SODIUM mmol/L 130* 131* 136   POTASSIUM mmol/L 7.6* 6.6* 4.9   CHLORIDE mmol/L 94* 96* 100   CO2 mmol/L 19.0* 23.0 25.0   BUN mg/dL 37* 43* 24*   CREATININE mg/dL 4.88* 2.59* 1.10   CALCIUM mg/dL 7.2* 7.7* 8.9   BILIRUBIN mg/dL 1.2 1.1 0.7   ALK PHOS U/L 218* 120* 100   ALT (SGPT) U/L 68* 33 37   AST (SGOT) U/L 143* 44* 44*   GLUCOSE mg/dL 279* 349* 226*       Radiology:   Imaging Results (Last 72 Hours)     Procedure Component Value Units Date/Time    US Renal Bilateral [[id number]] Collected: 10/19/21 1632     Updated: 10/19/21 1637    Narrative:       EXAMINATION: US RENAL BILATERAL-     10/19/2021 1:46 PM CDT     HISTORY: KATYA; J96.01-Acute respiratory failure with hypoxia;  U07.1-COVID-19; U07.1-COVID-19; J12.82-Pneumonia due to coronavirus  disease 2019; N17.9-Acute kidney failure, unspecified     Portable grayscale, color-flow, and Doppler ultrasound lesion of the  kidneys.     Right kidney = 140 x 53 x 60 mm.     Left kidney = 113 x 52 x 49 mm.     Small cortical cyst at the upper left kidney measures 14 x 13 mm.     An 8 mm echogenic focus within the lower left kidney is appearance of a  nonobstructing stone.     Cortical thickness and cortical echogenicity is appropriate.     There is no hydronephrosis.     Summary:  1. Symmetric kidneys with no acute abnormality and no sign of  obstruction.  This report was finalized on 10/19/2021 16:33 by Dr. Jason Martins MD.    XR Chest 1 View [[id number]] Collected: 10/19/21 1631     Updated: 10/19/21 1635    Narrative:      EXAM: XR CHEST 1 VW-     INDICATION: advanced ETT 3cm; J96.01-Acute respiratory failure with  hypoxia; U07.1-COVID-19; U07.1-COVID-19; J12.82-Pneumonia due to  coronavirus disease 2019; N17.9-Acute kidney failure, unspecified     COMPARISON: Same day radiograph     FINDINGS:     Endotracheal tube has been advanced and now terminates 4.3 cm above the  sandro. Left-sided CVL tip overlies the RIGHT atrium. Right-sided CVL  tip overlies the cavoatrial junction.     Cardiac silhouette is unchanged. No change in dense bilateral  consolidation and diffuse interstitial opacity. No large pleural  effusion or visible pneumothorax. No acute osseous finding.       Impression:         1.  Endotracheal tube is now 4 cm above the sandro.  2.  No significant change in bilateral dense consolidation and diffuse  interstitial opacity.  This report was finalized on 10/19/2021 16:32 by Dr. Ben Lara MD.    XR Chest 1 View [[id number]] Collected: 10/19/21 1444     Updated: 10/19/21 1504    Narrative:       EXAMINATION: XR CHEST 1 VW- 10/19/2021 2:44 PM CDT     HISTORY: line placement; J96.01-Acute respiratory failure with hypoxia;  U07.1-COVID-19; U07.1-COVID-19; J12.82-Pneumonia due to coronavirus  disease 2019; N17.9-Acute kidney failure, unspecified.     REPORT: A frontal view the chest was obtained.     COMPARISON: Chest x-ray 10/18/2021.     The endotracheal tube has been retracted to near the thoracic inlet,  approximately 8 cm superior to the sandro. Consider advancing the tube  approximately 3 cm. There is a new left internal jugular central line,  tip projects over the proximal SVC in good position. No pneumothorax is  identified. There is a right internal jugular central venous catheter  which appears in good position. There is no significant change in  extensive consolidating infiltrates in both lungs.       Impression:      1. Retraction of the endotracheal tube tip near the thoracic inlet.  Consider advancing the tube approximately 3 cm. There is a new left  internal jugular central line, which appears in good position. The right  internal jugular central line appears in good position. No pneumothorax  is identified. The lungs are essentially unchanged, with extensive  infiltrates bilaterally.  This report was finalized on 10/19/2021 15:01 by Dr. Kentrell Michel MD.    XR Abdomen KUB [[id number]] Collected: 10/18/21 1201     Updated: 10/18/21 1209    Narrative:      EXAM: XR ABDOMEN KUB- - 10/18/2021 10:43 AM CDT     HISTORY: OG placement; J96.01-Acute respiratory failure with hypoxia;  U07.1-COVID-19; U07.1-COVID-19; J12.82-Pneumonia due to coronavirus  disease 2019       COMPARISON: No existing relevant imaging studies available.      TECHNIQUE:  1 images.  Supine portable view the upper abdomen     FINDINGS:    See impression          Impression:      1. Limited exam. Gastric tube tip projects at the distal gastric body.  This report was finalized on 10/18/2021 12:05 by Dr Balbina Roth MD.    XR  Chest 1 View [[id number]] Collected: 10/18/21 1148     Updated: 10/18/21 1157    Narrative:      EXAM: XR CHEST 1 VW- - 10/18/2021 10:43 AM CDT     HISTORY: ett; J96.01-Acute respiratory failure with hypoxia;  U07.1-COVID-19; U07.1-COVID-19; J12.82-Pneumonia due to coronavirus  disease 2019       COMPARISON: 10/18/2021.      TECHNIQUE:  1 images.  Frontal view of the chest.     FINDINGS:    Endotracheal tube tip projects in mid trachea. Right central venous  catheter tip projects at lower SVC.     No pneumothorax or large pleural effusion. Bilateral pulmonary  opacities.     Cardiac and mediastinal silhouette appear within normal limits. No acute  bony finding on portable radiograph.          Impression:      1. Bilateral pulmonary opacities, which could represent edema, infection  or ARDS. These are similar compared to earlier the same day, increased  compared to 10/12/2021.  2. Lines and tubes as above.  This report was finalized on 10/18/2021 11:54 by Dr Balbina Roth MD.    XR Chest 1 View [[id number]] Collected: 10/18/21 0730     Updated: 10/18/21 0733    Narrative:      EXAMINATION: XR CHEST 1 VW-  10/18/2021 7:30 AM CDT 1 view     HISTORY: Respiratory Decline; J96.01-Acute respiratory failure with  hypoxia; U07.1-COVID-19     COMPARISON: 10/12/2021.     FINDINGS:   Consolidative change in the RIGHT mid to lower lung is slightly worsened  since the previous study. Patchy consolidations in the LEFT mid and  lower lung are also slightly worse since the prior study. The heart is  mildly enlarged.      The osseous structures and surrounding soft tissues demonstrate no acute  abnormality.          Impression:         1.  Slight interval worsening of consolidative changes in the lungs.        This report was finalized on 10/18/2021 07:30 by Dr. Feliz Magdaleno MD.          Culture:  Respiratory Culture   Date Value Ref Range Status   10/18/2021   Final    Light growth (2+) Normal Respiratory Annabel: NO S.aureus/MRSA  or Pseudomonas aeruginosa         Assessment   1.  Acute kidney injury, ATN--on dialysis  2.  COVID-19 pneumonia  3.  Acute respiratory failure  4.  Type 2 diabetes  5.  Severe metabolic acidosis  6.  Severe hyperkalemia  7.  Septic shock    Plan:  1.  Patient is significantly worse today in all respects. It appears that death is imminent. Family is enroute and will possibly withdraw care.  2.  Manage hyperkalemia medically as needed until family arrives      Ryan Yip, APRN  10/20/2021  09:45 CDT

## 2021-10-20 NOTE — PROGRESS NOTES
His blood pressure abruptly deteriorated. He lost his pulse and passed at 1004.     I spoke to his mother, Aicha Esquivel, at [phone].  She was informed of his passing.     Teresa Moralez, charge nurse, informed his ex-wife Regina.         Electronically signed by Malik Navarro DO, 10/20/21, 10:09 AM CDT.

## 2021-10-20 NOTE — PROGRESS NOTES
[facility] Medicine Services  INPATIENT PROGRESS NOTE    Patient Name: Abebe Esquivel  Date of Admission: 10/12/2021  Today's Date: 10/20/21  Length of Stay: 8  Primary Care Physician: Froylan Littlejohn DO    Subjective   Chief Complaint: Multiorgan system failure.   HPI  Unfortunately, he has done nothing but deteriorate.  He has been difficult to oxygenate with maximum ventilator settings.  He is suffering from a severe respiratory acidosis this morning with a PCO2 of greater than 102. Pulmonary aware.    His renal function has continued to deteriorate despite a dialysis treatment yesterday.  Potassium severely elevated at 7.6 this morning.  Nephrology to be made aware for ongoing dialysis.    I spoke to his mother, Aicha Esquivel, at [phone] again this morning.  She was informed of his worsening situation.  I was transparent with her and told her that I do not expect her son to survive his illness.  She was obviously very tearful and distraught, but understands.  We discussed CODE STATUS.  I informed her that even with the resuscitative effort but I would not expect him to survive.  He is maxed out on ventilatory support.  He is requiring dialysis.  He is requiring 2 vasopressors.  She agreed that this would be futile and made him a DNR. Conversation witness by the charge nurse, Teresa Moralez.     His mother is also very distraught because she is still in quarantine and does not anticipate being out of quarantine for several more days.  She is at home by herself and is unable to come to the window for a visit.    With his mother's permission, I spoke to his ex-wife, Shreya Rausch, at [phone].  She was fully informed as to his condition and prognosis.  She was tearful as well.  She does not know how to impart this news to their children.    EPINEPHrine, 0.02-0.3 mcg/kg/min, Last Rate: Stopped (10/19/21 9120)  fentanyl 10 mcg/mL,  mcg/hr, Last Rate: 300  mcg/hr (10/20/21 0014)  lactated ringers, 75 mL/hr, Last Rate: 75 mL/hr (10/19/21 1849)  norepinephrine, 0.02-0.3 mcg/kg/min, Last Rate: 0.26 mcg/kg/min (10/20/21 0603)  Pharmacy Consult - Pharmacy to dose,   propofol, 5-75 mcg/kg/min, Last Rate: 75 mcg/kg/min (10/20/21 0804)  sodium bicarbonate drip (greater than 75 mEq/bag), 75 mEq, Last Rate: 75 mEq (10/20/21 0510)  vasopressin, 0.03 Units/min, Last Rate: 0.03 Units/min (10/20/21 0001)  vecuronium, 0.5 mcg/kg/min, Last Rate: Stopped (10/19/21 0828)    Review of Systems  Unable to assess secondary to the patient's intubated and sedated state.     Objective    Temp:  [97.4 °F (36.3 °C)-99.7 °F (37.6 °C)] 99.7 °F (37.6 °C)  Heart Rate:  [] 116  Resp:  [32-33] 32  BP: ()/(31-86) 88/34  Arterial Line BP: ()/(35-72) 98/62  FiO2 (%):  [100 %] 100 %  Physical Exam  Constitutional:       Appearance: He is well-developed. He is obese.      Comments: Intubated, sedated. Spoke with his mother and ex-wife by phone. Discussed with his nurse, Kalyn.    HENT:      Head: Normocephalic and atraumatic.   Eyes:      Conjunctiva/sclera: Conjunctivae normal.      Pupils: Pupils are equal, round, and reactive to light.   Neck:      Vascular: No JVD.   Cardiovascular:      Rate and Rhythm: Regular rhythm. Tachycardia present.      Heart sounds: Normal heart sounds. No murmur heard.  No friction rub. No gallop.    Pulmonary:      Comments: Ventilated, diminished. Supine.   Abdominal:      General: Bowel sounds are normal. There is no distension.      Palpations: Abdomen is soft.      Tenderness: There is no abdominal tenderness.   Musculoskeletal:         General: Swelling present. No tenderness or deformity.      Cervical back: Neck supple.      Comments: L IJ CVC and R IJ Vascath placed by Dr. Camacho on 10/19. R radial art line placed on 10/18 by me.    Skin:     General: Skin is warm and dry.      Findings: No rash.   Neurological:      Motor: No abnormal muscle  tone.      Deep Tendon Reflexes: Reflexes normal.         Intake/Output Summary (Last 24 hours) at 10/20/2021 0738  Last data filed at 10/20/2021 0400  Gross per 24 hour   Intake 6499.49 ml   Output 50 ml   Net 6449.49 ml     Results Review:  I have reviewed the labs, radiology results, and diagnostic studies.    Laboratory Data:   Results from last 7 days   Lab Units 10/20/21  0430 10/19/21  0326 10/18/21  0419   WBC 10*3/mm3 37.84* 32.08* 13.32*   HEMOGLOBIN g/dL 16.1 16.2 15.9   HEMATOCRIT % 48.8 49.0 48.4   PLATELETS 10*3/mm3 195 175 204     Results from last 7 days   Lab Units 10/20/21  0655 10/19/21  0326 10/18/21  0419   SODIUM mmol/L 130* 131* 136   POTASSIUM mmol/L 7.6* 6.6* 4.9   CHLORIDE mmol/L 94* 96* 100   CO2 mmol/L 19.0* 23.0 25.0   BUN mg/dL 37* 43* 24*   CREATININE mg/dL 4.88* 2.59* 1.10   CALCIUM mg/dL 7.2* 7.7* 8.9   BILIRUBIN mg/dL 1.2 1.1 0.7   ALK PHOS U/L 218* 120* 100   ALT (SGPT) U/L 68* 33 37   AST (SGOT) U/L 143* 44* 44*   GLUCOSE mg/dL 279* 349* 226*     COVID LABS:  Results From Last 14 Days   Lab Units 10/20/21  0655 10/19/21  0326 10/18/21  0419 10/17/21  2354 10/17/21  0426 10/16/21  0511 10/16/21  0042 10/15/21  0323 10/14/21  0307 10/13/21  0321 10/13/21  0321 10/12/21  1755 10/12/21  1755   CRP mg/dL 2.97* 2.13* 0.39  --    < >   < >  --    < > 5.40*   < > 9.37*  --   --    D DIMER QUANT mg/L (FEU)  --   --   --   --   --   --   --   --   --   --   --   --  1.35*   FERRITIN ng/mL 2,717.00* 1,608.00*  --  1,582.00*  --    < > 1,641.00*  --  1,502.00*   < > 1,243.00*   < > 1,328.00*   LACTATE mmol/L  --   --   --   --   --   --   --   --   --   --   --   --  1.7   LDH U/L  --   --   --   --   --   --   --   --   --   --   --   --  694*   PROCALCITONIN ng/mL  --  0.94*  --  0.14  --   --  0.14  --  0.26*  --   --    < > 0.33*   TROPONIN T ng/mL  --   --   --   --   --   --   --   --   --   --  <0.010  --  <0.010   TRIGLYCERIDES mg/dL  --   --   --   --   --   --   --   --  270*  --    --   --   --     < > = values in this interval not displayed.     Radiology Data:   Imaging Results (Last 24 Hours)     Procedure Component Value Units Date/Time    US Renal Bilateral [[id number]] Collected: 10/19/21 1632     Updated: 10/19/21 1637    Narrative:      EXAMINATION: US RENAL BILATERAL-     10/19/2021 1:46 PM CDT     HISTORY: KATYA; J96.01-Acute respiratory failure with hypoxia;  U07.1-COVID-19; U07.1-COVID-19; J12.82-Pneumonia due to coronavirus  disease 2019; N17.9-Acute kidney failure, unspecified     Portable grayscale, color-flow, and Doppler ultrasound lesion of the  kidneys.     Right kidney = 140 x 53 x 60 mm.     Left kidney = 113 x 52 x 49 mm.     Small cortical cyst at the upper left kidney measures 14 x 13 mm.     An 8 mm echogenic focus within the lower left kidney is appearance of a  nonobstructing stone.     Cortical thickness and cortical echogenicity is appropriate.     There is no hydronephrosis.     Summary:  1. Symmetric kidneys with no acute abnormality and no sign of  obstruction.  This report was finalized on 10/19/2021 16:33 by Dr. Jason Martins MD.    XR Chest 1 View [[id number]] Collected: 10/19/21 1631     Updated: 10/19/21 1635    Narrative:      EXAM: XR CHEST 1 VW-     INDICATION: advanced ETT 3cm; J96.01-Acute respiratory failure with  hypoxia; U07.1-COVID-19; U07.1-COVID-19; J12.82-Pneumonia due to  coronavirus disease 2019; N17.9-Acute kidney failure, unspecified     COMPARISON: Same day radiograph     FINDINGS:     Endotracheal tube has been advanced and now terminates 4.3 cm above the  sandro. Left-sided CVL tip overlies the RIGHT atrium. Right-sided CVL  tip overlies the cavoatrial junction.     Cardiac silhouette is unchanged. No change in dense bilateral  consolidation and diffuse interstitial opacity. No large pleural  effusion or visible pneumothorax. No acute osseous finding.       Impression:         1.  Endotracheal tube is now 4 cm above the sandro.  2.  No  significant change in bilateral dense consolidation and diffuse  interstitial opacity.  This report was finalized on 10/19/2021 16:32 by Dr. Ben Lara MD.    XR Chest 1 View [[id number]] Collected: 10/19/21 1444     Updated: 10/19/21 1504    Narrative:      EXAMINATION: XR CHEST 1 VW- 10/19/2021 2:44 PM CDT     HISTORY: line placement; J96.01-Acute respiratory failure with hypoxia;  U07.1-COVID-19; U07.1-COVID-19; J12.82-Pneumonia due to coronavirus  disease 2019; N17.9-Acute kidney failure, unspecified.     REPORT: A frontal view the chest was obtained.     COMPARISON: Chest x-ray 10/18/2021.     The endotracheal tube has been retracted to near the thoracic inlet,  approximately 8 cm superior to the sandro. Consider advancing the tube  approximately 3 cm. There is a new left internal jugular central line,  tip projects over the proximal SVC in good position. No pneumothorax is  identified. There is a right internal jugular central venous catheter  which appears in good position. There is no significant change in  extensive consolidating infiltrates in both lungs.       Impression:      1. Retraction of the endotracheal tube tip near the thoracic inlet.  Consider advancing the tube approximately 3 cm. There is a new left  internal jugular central line, which appears in good position. The right  internal jugular central line appears in good position. No pneumothorax  is identified. The lungs are essentially unchanged, with extensive  infiltrates bilaterally.  This report was finalized on 10/19/2021 15:01 by Dr. Kentrell Michel MD.        Results from last 7 days   Lab Units 10/20/21  0345   PH, ARTERIAL pH units 6.926*   PO2 ART mm Hg 55.2*   PCO2, ARTERIAL mm Hg >102.0*   HCO3 ART mmol/L 21.6     FiO2 (%):  [100 %] 100 %  S RR:  [32] 32  PEEP/CPAP (cm H2O):  [15 cm H20] 15 cm H20  MAP (cm H2O):  [25-27] 25    I have reviewed the patient's current medications.     Assessment/Plan     Active Hospital Problems     Diagnosis    • **Pneumonia due to COVID-19 virus    • Acute respiratory distress syndrome (ARDS) due to 2019 novel coronavirus (HCC)    • Acute respiratory failure with hypoxia (HCC)    • Acute respiratory failure with hypercapnia (HCC)    • KATYA (acute kidney injury) (HCC)    • Hyperkalemia    • Shock liver    • Type 2 diabetes mellitus with hyperglycemia, without long-term current use of insulin (HCC)    • Obesity, Class III, BMI 40-49.9 (morbid obesity) (HCC)    • Hyponatremia      Plan:  The patient was admitted on 10/12 by Dr. Harden.  He presented to the emergency department with complaints of shortness of breath, cough, and fever.  He started symptoms of COVID-19 around 10/4 and tested positive on 10/5.  He is unvaccinated.  He required 5 L of oxygen to maintain saturations greater than 90% at presentation.    He was felt to be out of the window for benefit from remdesivir.  He was administered Tocilizumab on 10/13.  He continues to receive Decadron.  He continues to receive adjunctive treatments.    He continued to worsen from a respiratory standpoint throughout the course of his stay here.  He was ultimately placed on Vapotherm and then required BiPAP overnight from 10/17-10/18.  Dr. Gonzalez with pulmonology help that the patient should be electively intubated on the morning of 10/18.  The patient requested intubation secondary to his work of breathing and overall fatigue.  He was placed in the prone position shortly after intubation and returned to the supine position on the morning of 10/19. He is at times required paralytics for ventilator dyssynchrony.  He is requiring fentanyl and propofol for sedation.  He has become extremely difficult to oxygenate and has also developed severe carbon dioxide retention secondary to ARDS.  Pulmonary aware.    Albuterol HFA, Mucinex.    CRP had recently normalized, but back up.  Ferritin escalating.  Leukocytosis escalating.  Developing elevated transaminases likely  secondary to some degree of shock liver.    Recent procalcitonin was 0.94.  Streptococcal and Legionella urinary antigens negative.  MRSA nasal screen negative.  Sputum culture with mixed gram-positive aida. Dr. Dick with ID has added Merrem on 10/19.     He has developed an KATYA, which is felt to be multifactorial.  He was diuresed shortly after intubation secondary to elevated CVP and concerns for general volume overload given his exam and chest x-ray.  Nephrology evaluated him on 10/19 and initiated dialysis.  This morning, his renal function continues to deteriorate and he is severely hyperkalemic.  Dr. Iglesias is aware.  Dialysis is planned in short order.      He was not previously known to have diabetes.  His hemoglobin A1c is 8.4.  Continue basal insulin. Continue Accu-Cheks and sliding scale insulin.  Added every 6 hour regular insulin on 10/19.    Pepcid for peptic ulcer prophylaxis.    Lovenox for DVT prophylaxis.    I called his mother, Aicha, at [phone]. With his mother's permission, I also spoke to his ex-wife, Shreya Rausch, at [phone].  Please see the detailed description of our conversations and the HPI of this note.  The patient has been made a no CPR with full interventions by his mother.  They understand that we do not expect him to survive this.  They understand that he has a high likelihood of cardiac arrest at some point today.  His mother may eventually make him comfortable.  Palliative care is also involved.    Electronically signed by Malik Navarro DO, 10/20/21, 07:38 CDT.    Approximately 40 minutes of critical care time were spent managing the patient exclusive of billable procedures.

## 2021-10-20 NOTE — DISCHARGE SUMMARY
[facility] Medicine Services  DEATH SUMMARY       Date of Admission: 10/12/2021  Date of Death:  10/20/2021 at approximately 1004  Primary Care Physician: Froylan Littlejohn DO    Presenting Problem/History of Present Illness:  Shortness of breath.      Final Death Diagnoses:  Active Hospital Problems    Diagnosis    • **Pneumonia due to COVID-19 virus    • Acute respiratory distress syndrome (ARDS) due to 2019 novel coronavirus (HCC)    • Acute respiratory failure with hypoxia (HCC)    • Acute respiratory failure with hypercapnia (HCC)    • KATYA (acute kidney injury) (HCC)    • Hyperkalemia    • Shock liver    • Type 2 diabetes mellitus with hyperglycemia, without long-term current use of insulin (HCC)    • Obesity, Class III, BMI 40-49.9 (morbid obesity) (HCC)    • Hyponatremia      Consults:  1. Dr. Gonzalez with pulmonary.   2. Dr. Dick with infectious disease.   3. Dr. Iglesias with nephrology.   4. Dr. Flores with palliative care.     Procedures Performed:   1. Intubation and mechanical ventilation on 10/18 by me.   2. Central line and arterial line placement on 10/18 by me.   3. Central line and Vascath placement on 10/19 by Dr. Camacho.     Pertinent Test Results: Please see the EMR.     Hospital Course:  Please see my recent progress notes for full details regarding his hospital course.       He unfortunately developed multiorgan failure after requiring intubation for ARDS due to COVID-19.  He was on maximal ventilatory support this morning.  Today he was requiring multiple pressors.  He had worsening renal function electrolytes despite recent dialysis.  His mother and ex-wife were contacted earlier this morning.  His mother allowed him to be a DO NOT RESUSCITATE.  His blood pressure continued to deteriorate this morning and he ultimately lost a pulse.  He  at 1004. I informed his mother and nursing informed his ex-wife.     SANTIAGO is being contacted by the nursing  staff.  No autopsy requested.  The patient's body will be released to the  home of the family's choosing.    Electronically signed by Malik Navarro DO, 10/20/21, 10:13 CDT.    Time: 20 minutes.